# Patient Record
Sex: MALE | Race: ASIAN | NOT HISPANIC OR LATINO | URBAN - METROPOLITAN AREA
[De-identification: names, ages, dates, MRNs, and addresses within clinical notes are randomized per-mention and may not be internally consistent; named-entity substitution may affect disease eponyms.]

---
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
## 2020-01-29 NOTE — ED ADULT TRIAGE NOTE - MEANS OF ARRIVAL
wheelchair Complex Repair And Skin Substitute Graft Text: The defect edges were debeveled with a #15 scalpel blade.  The primary defect was closed partially with a complex linear closure.  Given the location of the remaining defect, shape of the defect and the proximity to free margins a skin substitute graft was deemed most appropriate to repair the remaining defect.  The graft was trimmed to fit the size of the remaining defect.  The graft was then placed in the primary defect, oriented appropriately, and sutured into place.

## 2022-06-26 ENCOUNTER — EMERGENCY (EMERGENCY)
Facility: HOSPITAL | Age: 22
LOS: 1 days | Discharge: HOME | End: 2022-06-26
Attending: EMERGENCY MEDICINE | Admitting: EMERGENCY MEDICINE
Payer: MEDICAID

## 2022-06-26 VITALS
HEART RATE: 72 BPM | TEMPERATURE: 97 F | RESPIRATION RATE: 18 BRPM | DIASTOLIC BLOOD PRESSURE: 84 MMHG | OXYGEN SATURATION: 99 % | SYSTOLIC BLOOD PRESSURE: 122 MMHG

## 2022-06-26 VITALS
WEIGHT: 184.97 LBS | SYSTOLIC BLOOD PRESSURE: 141 MMHG | HEART RATE: 91 BPM | DIASTOLIC BLOOD PRESSURE: 96 MMHG | OXYGEN SATURATION: 97 % | RESPIRATION RATE: 18 BRPM | HEIGHT: 68 IN | TEMPERATURE: 99 F

## 2022-06-26 PROCEDURE — 73030 X-RAY EXAM OF SHOULDER: CPT | Mod: 26,LT

## 2022-06-26 PROCEDURE — 96374 THER/PROPH/DIAG INJ IV PUSH: CPT

## 2022-06-26 PROCEDURE — 99284 EMERGENCY DEPT VISIT MOD MDM: CPT

## 2022-06-26 PROCEDURE — 73030 X-RAY EXAM OF SHOULDER: CPT

## 2022-06-26 PROCEDURE — 99284 EMERGENCY DEPT VISIT MOD MDM: CPT | Mod: 25

## 2022-06-26 PROCEDURE — 73030 X-RAY EXAM OF SHOULDER: CPT | Mod: 26,LT,77

## 2022-06-26 RX ORDER — MORPHINE SULFATE 50 MG/1
4 CAPSULE, EXTENDED RELEASE ORAL ONCE
Refills: 0 | Status: DISCONTINUED | OUTPATIENT
Start: 2022-06-26 | End: 2022-06-26

## 2022-06-26 RX ADMIN — MORPHINE SULFATE 4 MILLIGRAM(S): 50 CAPSULE, EXTENDED RELEASE ORAL at 20:10

## 2022-06-26 RX ADMIN — MORPHINE SULFATE 4 MILLIGRAM(S): 50 CAPSULE, EXTENDED RELEASE ORAL at 19:55

## 2022-06-26 NOTE — ED PROVIDER NOTE - NSFOLLOWUPINSTRUCTIONS_ED_ALL_ED_FT
Shoulder Dislocation       Your shoulder joint is made up of 3 bones:  •The upper arm bone (humerus).      •The shoulder blade (scapula).      •The collarbone (clavicle).      A shoulder dislocation happens when your upper arm bone moves out of its normal place in your shoulder joint.      What are the causes?    This condition is often caused by:  •A fall.      •A hard, direct hit to the shoulder.      •A forceful movement of the shoulder.        What increases the risk?    You are more likely to develop this condition if you play sports.      What are the signs or symptoms?     •Bad shape (deformity) of the shoulder.      •Very bad pain.      •A shoulder that you cannot move.      •Numbness, weakness, or tingling in your neck or down your arm.      •Bruising or swelling around your shoulder.        How is this treated?    This condition is treated with a procedure called a reduction. This is done to place the upper arm bone back in the joint. There are two types of reduction:  •Closed reduction. The upper arm bone is placed back in the joint without surgery. The doctor uses his or her hands to guide the bone back into place.    •Open reduction. Surgery is done to place the upper arm bone back in the joint. This may be needed if:  •You have a weak shoulder joint or weak tissues that connect bones to each other (ligaments).      •You have had more than one shoulder dislocation.      •The nerves or blood vessels around your shoulder have been damaged.        After the procedure, you will wear a brace or sling to prevent the arm from moving.    After the brace or sling is removed, you will have physical therapy to help improve movement (range of motion) in your shoulder joint.      Follow these instructions at home:    Medicines     •Take over-the-counter and prescription medicines only as told by your doctor.    •Ask your doctor if the medicine prescribed to you:   •Requires you to avoid driving or using heavy machinery.     •Can cause trouble pooping (constipation). You may need to take steps to prevent or treat trouble pooping:  •Drink enough fluid to keep your pee (urine) pale yellow.      •Take over-the-counter or prescription medicines.      •Eat foods that are high in fiber. These include beans, whole grains, and fresh fruits and vegetables.       •Limit foods that are high in fat and sugar. These include fried or sweet foods.          If you have a brace or sling:     •Wear the brace or sling as told by your doctor. Remove it only as told by your doctor.    •Loosen the brace or sling if your fingers:  •Tingle.      •Become numb.      •Turn cold or blue.        •Keep the brace or sling clean.    •If the brace or sling is not waterproof:  •Do not let it get wet.      •Cover it with a watertight covering when you take a bath or shower.          Managing pain, stiffness, and swelling    •If told, put ice on the injured area.  •If you can remove your brace or sling, remove it as told by your doctor.      •Put ice in a plastic bag.      •Place a towel between your skin and the bag.      •Leave the ice on for 20 minutes, 2–3 times per day.        •Move your fingers often.      •Raise (elevate) the injured area above the level of your heart while you are sitting or lying down.      Activity     • Do not lift your arm above shoulder level until your doctor approves.      • Do not lift anything until your doctor says that it is safe.      • Do not push or pull things until your doctor approves.      •Return to your normal activities as told by your doctor. Ask your doctor what activities are safe for you.      •Do range-of-motion exercises only as told by your doctor.      •Exercise your hand by squeezing a soft ball. This keeps your hand and wrist from getting stiff and swollen.      General instructions     • Do not drive while you are wearing a brace or sling on a hand that you use for driving.      • Do not take baths, swim, or use a hot tub until your doctor approves. Ask your doctor if you may take showers. You may only be allowed to take sponge baths.      • Do not use any tobacco products, including cigarettes, chewing tobacco, or e-cigarettes. These can delay healing. If you need help quitting, ask your doctor.      •Keep all follow-up visits as told by your doctor. This is important.        Contact a doctor if:    •Your brace or sling gets damaged.        Get help right away if:    •Your pain gets worse, not better.      •You lose feeling in your arm or hand.      •Your arm or hand turns white and cold.        Summary    •A shoulder dislocation happens when your upper arm bone moves out of its normal place in your shoulder joint.      •It is often caused by a fall, a strong hit to the shoulder, or a forceful movement of the shoulder.      •It causes very bad pain. You may not be able to move your shoulder.      •This condition is treated with either closed or open reduction. You will also be given a brace or sling. You will do exercises to improve movement in your shoulder joint.      •Contact a doctor if your brace or sling gets damaged. Get help right away if your pain gets worse, you lose feeling in your arm or hand, or your arm or hand turns white or cold.      This information is not intended to replace advice given to you by your health care provider. Make sure you discuss any questions you have with your health care provider.      Document Revised: 07/17/2019 Document Reviewed: 07/17/2019    Elsevier Patient Education © 2022 [employer]

## 2022-06-26 NOTE — ED PROVIDER NOTE - CARE PROVIDER_API CALL
Jabari Olivera)  Len Pandya  Physicians  [address] [place] [address] [address]  [place] [address]  Phone: [phone]  Fax: [phone]  Follow Up Time: 4-6 Days

## 2022-06-26 NOTE — ED PROVIDER NOTE - PATIENT PORTAL LINK FT
You can access the FollowMyHealth Patient Portal offered by [facility] by registering at the following website: [email or web address]. By joining [employer]’s FollowMyHealth portal, you will also be able to view your health information using other applications (apps) compatible with our system.

## 2022-06-26 NOTE — ED ADULT NURSE NOTE - OBJECTIVE STATEMENT
Patient A/Ox4 with clear speech and a steady gait. Mother at bedside. C/o left shoulder dislocation while swimming today. Says this has happened once before. Left shoulder appears deformed on exam. + brachial and radial pulses. Denies numbness or tingling. Ice packs applied. Patient was in UC today where x rays of confirmation were done and sling was applied. NAD noted.

## 2022-06-26 NOTE — ED ADULT NURSE NOTE - CHIEF COMPLAINT
Chief Complaint   Patient presents with     Ent Problem     would like to discuss inspire implant- last home sleep study done in 2016 has CPAP but unable to use it      History of Present Illness   Sarath Gray is a 55 year old male who presents today for evaluation.  The patient describes symptoms of a long history of obstructive sleep apnea.  He has been trying for the last several years to use CPAP every night.  He oftentimes wakes up and has taken the CPAP.  He does have problems with the CPAP leaking.  He has a lot of trouble breathing through his nose and so nasal mask do not help him.  He describes poor sleep, restless sleep, daytime tiredness, etc.  He has a lot of aches and pains and so has trouble sleeping continuously on his back.  He does still have his tonsils.  He had multiple nasal injuries in the past.  He is a current tobacco user, roughly 1 pack/day.    The patient underwent a home sleep study on 4/11/2016 which showed an overall AHI of 57.2 events per hour.  The patient's BMI at the time of the study was 24.9 kg/m . The patient's current BMI is 26.35 kg/m .    Past Medical History  Patient Active Problem List   Diagnosis     Esophageal reflux     Tobacco use disorder     Impotence of organic origin     Low back pain     Hyperlipidemia LDL goal <100     History of PSVT (paroxysmal supraventricular tachycardia)     S/P total knee arthroplasty     Left knee DJD     Essential hypertension, benign     CARROLL (obstructive sleep apnea)     Acute appendicitis with generalized peritonitis, unspecified whether abscess present, unspecified whether gangrene present, unspecified whether perforation present     Acute perforated appendicitis     Postoperative infection     Postoperative intra-abdominal abscess     Current Medications     Current Outpatient Medications:      albuterol (PROAIR HFA) 108 (90 Base) MCG/ACT inhaler, Inhale 2 puffs into the lungs every 4 hours as needed for shortness of breath /  The patient is a 21y Male complaining of pain, shoulder. dyspnea or wheezing, Disp: 1 Inhaler, Rfl: 11     amLODIPine (NORVASC) 10 MG tablet, Take 1 tablet (10 mg) by mouth daily, Disp: 90 tablet, Rfl: 1     atorvastatin (LIPITOR) 20 MG tablet, Take 1 tablet (20 mg) by mouth daily, Disp: 90 tablet, Rfl: 2     lisinopril (ZESTRIL) 20 MG tablet, Take 1 tablet (20 mg) by mouth daily, Disp: 90 tablet, Rfl: 1     omeprazole (PRILOSEC) 40 MG DR capsule, Take 1 Capsule BY MOUTH TWICE DAILY, Disp: 180 capsule, Rfl: 1     order for DME, Equipment being ordered: CPAP Patient Sarath Gray was set up at [facility] [place]  on April 21, 2016. Patient received a ResYarraa AirSense 10 Auto. Pressures were set at Auto 5 - 15 cm H2O.   Patient s ramp is 5 cm H2O for Auto and FLEX/EPR is 2.  Patient received a [employer] Mask name: SIMPLUS  Full Face mask Size Large, heated tubing and heated humidifier.  Patient is enrolled in the STM Program and does not need to meet compliance. Patient has a follow up on June 14TH AT 9 AM with YADIRA Resendez.   Jennifer Sam, Disp: , Rfl:      ORDER FOR DME, Equipment being ordered: Other: CPM machine for home use. Set max tolerance and increase 3-5 degrees/day as tolerated. Use up to 6-8 hours/day as tolerated. Treatment Diagnosis: left TKA, Disp: 1 Device, Rfl: 0  No current facility-administered medications for this visit.    Facility-Administered Medications Ordered in Other Visits:      iopamidol (ISOVUE-370) 76% solution 80 mL, 80 mL, Intravenous, Once, Maik Smith MD     sodium chloride 0.9 % for CT scan flush dose 80 mL, 80 mL, Intravenous, Once, Maik Smith MD    Allergies  Allergies   Allergen Reactions     Persantine [Dipyridamole] Other (See Comments)     Nervous and anxiety       Social History   Social History     Socioeconomic History     Marital status: Single     Spouse name: Not on file     Number of children: Not on file     Years of education: Not on file     Highest education level: Not on file   Occupational History     Not  "on file   Tobacco Use     Smoking status: Light Tobacco Smoker     Packs/day: 1.00     Years: 18.00     Pack years: 18.00     Smokeless tobacco: Never Used     Tobacco comment: Started at age 29.    Substance and Sexual Activity     Alcohol use: Yes     Alcohol/week: 0.0 standard drinks     Comment: occassional beer     Drug use: No     Sexual activity: Yes     Partners: Female     Birth control/protection: Surgical   Other Topics Concern     Parent/sibling w/ CABG, MI or angioplasty before 65F 55M? Not Asked   Social History Narrative    ** Merged History Encounter **          Social Determinants of Health     Financial Resource Strain: Not on file   Food Insecurity: Not on file   Transportation Needs: Not on file   Physical Activity: Not on file   Stress: Not on file   Social Connections: Not on file   Intimate Partner Violence: Not on file   Housing Stability: Not on file       Family History  Family History   Problem Relation Age of Onset     C.A.D. Father 36        MI at age 36-37     Cerebrovascular Disease Father      Allergies Father      Anesthesia Reaction Father      Cardiovascular Paternal Grandfather      Prostate Cancer No family hx of      Colon Cancer No family hx of        Review of Systems  As per HPI and PMHx, otherwise 10+ comprehensive system review is negative.    Physical Exam  BP (!) 151/88 (BP Location: Left arm, Patient Position: Chair, Cuff Size: Adult Regular)   Pulse 83   Temp 98  F (36.7  C) (Tympanic)   Ht 2.083 m (6' 10\")   Wt 114.3 kg (252 lb)   SpO2 99%   BMI 26.35 kg/m    GENERAL: Patient is a pleasant, cooperative 55 year old male in no acute distress.  HEAD: Normocephalic, atraumatic.  Hair and scalp are normal.  EYES: Pupils are equal, round, reactive to light and accommodation.  Extraocular movements are intact.  The sclera nonicteric without injection.  The extraocular structures are normal.  EARS: Normal shape and symmetry.  No tenderness when palpating the mastoid or " tragal areas bilaterally.    NOSE: Nares are patent.  Nasal mucosa is dry.  The patient has a rightward nasal septal deviation.  No nasal cavity masses, polyps, or mucopurulence on anterior rhinoscopy.  ORAL CAVITY: Dentition is in mixed repair.  Mucous membranes are dry.  Tongue is mobile, protrudes to the midline.  Palate elevates symmetrically.  Tonsils are 1+, symmetric.  No erythema or exudate.  Uvula is somewhat elongated.  No oral cavity or oropharyngeal masses, lesions, ulcerations, leukoplakia.  The patient has a Nicholson tongue/palate position grade 3.  NECK: Supple, trachea is midline.  The patient has 3 fingerbreadths of hyomental distance.  There no palpable cervical lymphadenopathy or masses bilaterally.      NEUROLOGIC: Cranial nerves II through XII are grossly intact.  Voice is strong.  Patient is House-Brackmann I/VI bilaterally.  CARDIOVASCULAR: Extremities are warm and well-perfused.  No significant peripheral edema.  RESPIRATORY: Patient has nonlabored breathing without cough, wheeze, stridor.  PSYCHIATRIC: Patient is alert and oriented.  Mood and affect appear normal.  SKIN: Warm and dry.  No scalp, face, or neck lesions noted.    Assessment and Plan     ICD-10-CM    1. Severe obstructive sleep apnea  G47.33 Case Request: DRUG INDUCED SLEEP ENDOSCOPY   2. Intolerance of continuous positive airway pressure (CPAP) ventilation  Z78.9 Case Request: DRUG INDUCED SLEEP ENDOSCOPY   3. BMI 26.0-26.9,adult  Z68.26 Case Request: DRUG INDUCED SLEEP ENDOSCOPY   4. Tobacco dependence  F17.200    5. Encounter for tobacco use cessation counseling  Z71.6      It was my pleasure seeing Sarath Gray today in clinic.  The patient presents to clinic today with severe obstructive sleep apnea intolerant to CPAP.  The patient's BMI is a 6.35 kg/m .  They are interested in the hypoglossal nerve stimulator.  We discussed placement of the hypoglossal nerve stimulator including postoperative course, activation, need  for battery change, limitation of the chest/abdomen/pelvis MRI, need for alteration of airport screening.  We discussed the need of drug induced sleep endoscopy to ensure candidacy. Given the age of his prior sleep study, he would likely need an updated sleep study either home or in lab.  We can get the sleep endoscopy first to see if he is a candidate and then pursue updated sleep study if he is a candidate.     We discussed the risks, benefits, alternatives, options of drug-induced sleep endoscopy including, but not, limited to: risk of general anesthesia, potential need for additional procedures.  We discussed the postoperative course and convalescence and need for  the day of the procedure.  The patient voiced understanding and is willing to proceed.    Sarath to follow up with Primary Care provider regarding elevated blood pressure.    Ciro Muniz MD  Department of Otolaryngology-Head and Neck Surgery  [facility]

## 2022-06-26 NOTE — ED PROVIDER NOTE - OBJECTIVE STATEMENT
20 yo male with pain to left shoulder today. Patient is right hand dominant.   Has h/o dislocated shoulder and feels the same.   Injury while swimming today. Denies numbness and tingling + pain left shoulder.

## 2022-06-26 NOTE — ED PROVIDER NOTE - CLINICAL SUMMARY MEDICAL DECISION MAKING FREE TEXT BOX
20 yo M p/w R shoulder pain sp inj today - states feels dislocated. Pt with no fall / head inj. no neck / back pain. no numb/ting/focal weak. No cp/sob/palp. Inc pain with movement of shoulder. No other acute inj or co.  Pt with no covid exposure.  Exam: MM moist. neck supple. no meningeal signs. R shoulder with obv deformity, palpable glenoid. NL dist str/sens equal bl, 2+ pulses. nl cap refill. no other acute signs of trauma.  Acute Shoulder disloc, reduction by PA, post-red xr, outpt fu.

## 2022-06-27 PROBLEM — Z00.00 ENCOUNTER FOR PREVENTIVE HEALTH EXAMINATION: Status: ACTIVE | Noted: 2022-06-27

## 2022-07-08 ENCOUNTER — APPOINTMENT (OUTPATIENT)
Dept: ORTHOPEDIC SURGERY | Facility: CLINIC | Age: 22
End: 2022-07-08

## 2022-07-08 VITALS — WEIGHT: 185 LBS | HEIGHT: 68 IN | BODY MASS INDEX: 28.04 KG/M2

## 2022-07-08 DIAGNOSIS — Z78.9 OTHER SPECIFIED HEALTH STATUS: ICD-10-CM

## 2022-07-08 PROCEDURE — 73030 X-RAY EXAM OF SHOULDER: CPT | Mod: LT

## 2022-07-08 PROCEDURE — 99204 OFFICE O/P NEW MOD 45 MIN: CPT

## 2022-07-08 NOTE — DISCUSSION/SUMMARY
[de-identified] : Patient will have a left shoulder MRI arthrogram to rule out labrum tear. They will follow up after test.\par Will begin pt.\par Fu after test. \par May consider surgery. \par \par "Written by Jairo Paz, acting as Scribe for Jabari Olivera M.D" \par \par Home Exercise \par \par  The patient is instructed on a home exercise program. \par  \par RICE \par I explained to the patient that rest, ice, compression, and elevation would benefit them.  They may return to activity after follow-up or when they no longer have any pain. \par  \par Pain Guide Activities \par The patient was advised to let pain guide the gradual advancement of activities. \par  \par Activity Modification \par The patient was advised to modify their activities. \par  \par Dx / Natural History \par The patient was advised of the diagnosis.  The natural history of the pathology was explained in full to the patient in layman's terms.  Several different treatment options were discussed and explained in full to the patient including the risks and benefits of both surgical and non-surgical treatments.  All questions and concerns were answered.\par

## 2022-07-08 NOTE — HISTORY OF PRESENT ILLNESS
[de-identified] : The patient is a 21 year old right hand dominant male who presents today complaining of left shoulder.  Has been dislocated multiple times. One dislocation 5 years ago, went to ER. And two occasion after that that he was able to relocate it himself, very recently it dislocated again while swimming and then relocated itself. \par Date of Injury/Onset: 2 weeks ago\par Pain:    At Rest: 0/10 \par With Activity:  0/10 \par Mechanism of injury: Patient was swimming and heard a pop went to the ER was told it was dislocated\par This is not a Work Related Injury being treated under Worker's Compensation.\par This is not an athletic injury occurring associated with an interscholastic or organized sports team.\par Quality of symptoms: \par Improves with: rest\par Worse with: activity\par Prior treatment: [facility] \par Prior Imaging: none\par Out of work/sport: _, since _\par School/Sport/Position/Occupation: working, fulltime \par Additional Information: None\par

## 2022-07-08 NOTE — PHYSICAL EXAM
[de-identified] : Neurologic: normal coordination, normal DTR UE/LE , normal sensation, normal mood and affect, orientated and able to communicate.\par \par Skin: normal skin, no rash, no ulcers and no lesions.\par \par Lymphatic: no obvious lymphadenopathy in areas examined.\par \par Constitutional: well developed and well nourished.\par \par Cardiovascular: peripheral vascular exam is grossly normal.\par \par Pulmonary: no respiratory distress, lungs clear to auscultation bilaterally.\par \par Abdomen: normal bowel sounds, non-tender, no HSM and no mass.\par  [] : motor and sensory intact distally [Left] : left shoulder [There are no fractures, subluxations or dislocations. No significant abnormalities are seen] : There are no fractures, subluxations or dislocations. No significant abnormalities are seen [de-identified] : apprehention, relocation, 3+ anterior tability.  [de-identified] : apprehension, relocation, 3+ anterior stability.

## 2022-07-29 ENCOUNTER — APPOINTMENT (OUTPATIENT)
Dept: ORTHOPEDIC SURGERY | Facility: CLINIC | Age: 22
End: 2022-07-29

## 2022-07-29 DIAGNOSIS — M79.18 MYALGIA, OTHER SITE: ICD-10-CM

## 2022-07-29 DIAGNOSIS — M25.512 PAIN IN LEFT SHOULDER: ICD-10-CM

## 2022-07-29 PROCEDURE — 99214 OFFICE O/P EST MOD 30 MIN: CPT

## 2022-08-01 NOTE — DATA REVIEWED
[FreeTextEntry1] : [facility] 7/22/22 Lt shoulder MRI contrast: Slap tear extending into the posterosuperior labrum, with small posterosuperior paralabral cyst.Mild tendinopathy/ articular surface fraying of the supraspinatus and naterior infraspinatus tendons. No rotator cuff tear identified. Moderate sized chronic hill sachs deformity. of the humeral head with subcortical cystic change.

## 2022-08-01 NOTE — DISCUSSION/SUMMARY
[de-identified] : Will begin pt for SLAP tear. Will follow up if pain persists. \par \par "Written by Jairo Paz, acting as Scribe for Jabari Olivera M.D" \par \par Home Exercise \par \par  The patient is instructed on a home exercise program. \par  \par RICE \par I explained to the patient that rest, ice, compression, and elevation would benefit them.  They may return to activity after follow-up or when they no longer have any pain. \par  \par Pain Guide Activities \par The patient was advised to let pain guide the gradual advancement of activities. \par  \par Activity Modification \par The patient was advised to modify their activities. \par  \par Dx / Natural History \par The patient was advised of the diagnosis.  The natural history of the pathology was explained in full to the patient in layman's terms.  Several different treatment options were discussed and explained in full to the patient including the risks and benefits of both surgical and non-surgical treatments.  All questions and concerns were answered.\par

## 2022-08-01 NOTE — HISTORY OF PRESENT ILLNESS
[de-identified] : 7/29/22: Patient presents for MRI review left shoulder. \par \par The patient is a 21 year old right hand dominant male who presents today complaining of left shoulder. Has been dislocated multiple times. One dislocation 5 years ago, went to ER. And two occasion after that that he was able to relocate it himself, very recently it dislocated again while swimming and then relocated itself. \par Date of Injury/Onset: 2 weeks ago\par Pain: At Rest: 0/10 \par With Activity: 0/10 \par Mechanism of injury: Patient was swimming and heard a pop went to the ER was told it was dislocated\par This is not a Work Related Injury being treated under Worker's Compensation.\par This is not an athletic injury occurring associated with an interscholastic or organized sports team.\par Quality of symptoms: \par Improves with: rest\par Worse with: activity\par Treatment/ Images/ Studies since last visit: \par Out of work/sport: _, since _\par School/Sport/Position/Occupation: working, fulltime \par Additional Information: None\par

## 2023-04-19 NOTE — ED PROVIDER NOTE - CHPI ED SYMPTOMS NEG
28-Mar-2023 18:40 no abrasion/no back pain/no fever/no numbness/no tingling/no bruising/no difficulty bearing weight

## 2023-05-22 NOTE — ED ADULT NURSE NOTE - NS_SISCREENINGSR_GEN_ALL_ED
Negative Ear Wedge Repair Text: A wedge excision was completed by carrying down an excision through the full thickness of the ear and cartilage with an inward facing Burow's triangle. The wound was then closed in a layered fashion.

## 2023-07-07 ENCOUNTER — NON-APPOINTMENT (OUTPATIENT)
Age: 23
End: 2023-07-07

## 2024-03-03 ENCOUNTER — NON-APPOINTMENT (OUTPATIENT)
Age: 24
End: 2024-03-03

## 2024-03-04 NOTE — ED ADULT TRIAGE NOTE - CADM TRG TX PRIOR TO ARRIVAL
none
,[email or web address]

## 2024-03-05 ENCOUNTER — INPATIENT (INPATIENT)
Facility: HOSPITAL | Age: 24
LOS: 3 days | Discharge: HOME | DRG: 871 | End: 2024-03-09
Attending: INTERNAL MEDICINE | Admitting: INTERNAL MEDICINE
Payer: MEDICAID

## 2024-03-05 VITALS
TEMPERATURE: 98 F | OXYGEN SATURATION: 98 % | DIASTOLIC BLOOD PRESSURE: 60 MMHG | SYSTOLIC BLOOD PRESSURE: 120 MMHG | RESPIRATION RATE: 16 BRPM | HEART RATE: 108 BPM

## 2024-03-05 DIAGNOSIS — Z29.9 ENCOUNTER FOR PROPHYLACTIC MEASURES, UNSPECIFIED: ICD-10-CM

## 2024-03-05 DIAGNOSIS — N17.9 ACUTE KIDNEY FAILURE, UNSPECIFIED: ICD-10-CM

## 2024-03-05 DIAGNOSIS — E87.8 OTHER DISORDERS OF ELECTROLYTE AND FLUID BALANCE, NOT ELSEWHERE CLASSIFIED: ICD-10-CM

## 2024-03-05 DIAGNOSIS — A41.9 SEPSIS, UNSPECIFIED ORGANISM: ICD-10-CM

## 2024-03-05 DIAGNOSIS — M25.512 PAIN IN LEFT SHOULDER: ICD-10-CM

## 2024-03-05 LAB
ALBUMIN SERPL ELPH-MCNC: 2.6 G/DL — LOW (ref 3.3–5)
ALP SERPL-CCNC: 84 U/L — SIGNIFICANT CHANGE UP (ref 40–120)
ALT FLD-CCNC: 59 U/L — SIGNIFICANT CHANGE UP (ref 12–78)
ANION GAP SERPL CALC-SCNC: 16 MMOL/L — SIGNIFICANT CHANGE UP (ref 5–17)
ANISOCYTOSIS BLD QL: SLIGHT — SIGNIFICANT CHANGE UP
AST SERPL-CCNC: 42 U/L — HIGH (ref 15–37)
BASOPHILS # BLD AUTO: 0 K/UL — SIGNIFICANT CHANGE UP (ref 0–0.2)
BASOPHILS NFR BLD AUTO: 0 % — SIGNIFICANT CHANGE UP (ref 0–2)
BILIRUB SERPL-MCNC: 2.7 MG/DL — HIGH (ref 0.2–1.2)
BUN SERPL-MCNC: 29 MG/DL — HIGH (ref 7–23)
CALCIUM SERPL-MCNC: 8.7 MG/DL — SIGNIFICANT CHANGE UP (ref 8.5–10.1)
CHLORIDE SERPL-SCNC: 97 MMOL/L — SIGNIFICANT CHANGE UP (ref 96–108)
CK SERPL-CCNC: 235 U/L — SIGNIFICANT CHANGE UP (ref 26–308)
CO2 SERPL-SCNC: 21 MMOL/L — LOW (ref 22–31)
CREAT SERPL-MCNC: 4.8 MG/DL — HIGH (ref 0.5–1.3)
EGFR: 16 ML/MIN/1.73M2 — LOW
EOSINOPHIL # BLD AUTO: 0.26 K/UL — SIGNIFICANT CHANGE UP (ref 0–0.5)
EOSINOPHIL NFR BLD AUTO: 2 % — SIGNIFICANT CHANGE UP (ref 0–6)
FLUAV AG NPH QL: SIGNIFICANT CHANGE UP
FLUBV AG NPH QL: SIGNIFICANT CHANGE UP
GLUCOSE SERPL-MCNC: 156 MG/DL — HIGH (ref 70–99)
HCT VFR BLD CALC: 43 % — SIGNIFICANT CHANGE UP (ref 39–50)
HGB BLD-MCNC: 14.6 G/DL — SIGNIFICANT CHANGE UP (ref 13–17)
LACTATE SERPL-SCNC: 5.8 MMOL/L — CRITICAL HIGH (ref 0.7–2)
LACTATE SERPL-SCNC: 8.6 MMOL/L — CRITICAL HIGH (ref 0.7–2)
LIDOCAIN IGE QN: 53 U/L — SIGNIFICANT CHANGE UP (ref 13–75)
LYMPHOCYTES # BLD AUTO: 0 % — LOW (ref 13–44)
LYMPHOCYTES # BLD AUTO: 0 K/UL — LOW (ref 1–3.3)
MAGNESIUM SERPL-MCNC: 0.8 MG/DL — CRITICAL LOW (ref 1.6–2.6)
MANUAL SMEAR VERIFICATION: SIGNIFICANT CHANGE UP
MCHC RBC-ENTMCNC: 29 PG — SIGNIFICANT CHANGE UP (ref 27–34)
MCHC RBC-ENTMCNC: 34 GM/DL — SIGNIFICANT CHANGE UP (ref 32–36)
MCV RBC AUTO: 85.5 FL — SIGNIFICANT CHANGE UP (ref 80–100)
METAMYELOCYTES # FLD: 3 % — HIGH (ref 0–0)
MONOCYTES # BLD AUTO: 0 K/UL — SIGNIFICANT CHANGE UP (ref 0–0.9)
MONOCYTES NFR BLD AUTO: 0 % — LOW (ref 2–14)
MYELOCYTES NFR BLD: 5 % — HIGH (ref 0–0)
NEUTROPHILS # BLD AUTO: 11.56 K/UL — HIGH (ref 1.8–7.4)
NEUTROPHILS NFR BLD AUTO: 47 % — SIGNIFICANT CHANGE UP (ref 43–77)
NEUTS BAND # BLD: 43 % — HIGH (ref 0–8)
NRBC # BLD: 0 /100 WBCS — SIGNIFICANT CHANGE UP (ref 0–0)
NRBC # BLD: SIGNIFICANT CHANGE UP /100 WBCS (ref 0–0)
PLAT MORPH BLD: NORMAL — SIGNIFICANT CHANGE UP
PLATELET # BLD AUTO: 209 K/UL — SIGNIFICANT CHANGE UP (ref 150–400)
POIKILOCYTOSIS BLD QL AUTO: SLIGHT — SIGNIFICANT CHANGE UP
POTASSIUM SERPL-MCNC: 4.3 MMOL/L — SIGNIFICANT CHANGE UP (ref 3.5–5.3)
POTASSIUM SERPL-SCNC: 4.3 MMOL/L — SIGNIFICANT CHANGE UP (ref 3.5–5.3)
PROT SERPL-MCNC: 6.7 G/DL — SIGNIFICANT CHANGE UP (ref 6–8.3)
RBC # BLD: 5.03 M/UL — SIGNIFICANT CHANGE UP (ref 4.2–5.8)
RBC # FLD: 12.8 % — SIGNIFICANT CHANGE UP (ref 10.3–14.5)
RBC BLD AUTO: SIGNIFICANT CHANGE UP
RSV RNA NPH QL NAA+NON-PROBE: SIGNIFICANT CHANGE UP
SARS-COV-2 RNA SPEC QL NAA+PROBE: SIGNIFICANT CHANGE UP
SODIUM SERPL-SCNC: 134 MMOL/L — LOW (ref 135–145)
WBC # BLD: 12.84 K/UL — HIGH (ref 3.8–10.5)
WBC # FLD AUTO: 12.84 K/UL — HIGH (ref 3.8–10.5)

## 2024-03-05 PROCEDURE — 99223 1ST HOSP IP/OBS HIGH 75: CPT

## 2024-03-05 PROCEDURE — 99285 EMERGENCY DEPT VISIT HI MDM: CPT

## 2024-03-05 PROCEDURE — 93010 ELECTROCARDIOGRAM REPORT: CPT

## 2024-03-05 PROCEDURE — 70450 CT HEAD/BRAIN W/O DYE: CPT | Mod: 26

## 2024-03-05 PROCEDURE — 76770 US EXAM ABDO BACK WALL COMP: CPT | Mod: 26

## 2024-03-05 PROCEDURE — 71250 CT THORAX DX C-: CPT | Mod: 26

## 2024-03-05 PROCEDURE — 99223 1ST HOSP IP/OBS HIGH 75: CPT | Mod: GC

## 2024-03-05 PROCEDURE — 74176 CT ABD & PELVIS W/O CONTRAST: CPT | Mod: 26

## 2024-03-05 PROCEDURE — 71045 X-RAY EXAM CHEST 1 VIEW: CPT | Mod: 26

## 2024-03-05 RX ORDER — ACETAMINOPHEN 500 MG
1000 TABLET ORAL ONCE
Refills: 0 | Status: COMPLETED | OUTPATIENT
Start: 2024-03-05 | End: 2024-03-05

## 2024-03-05 RX ORDER — ACETAMINOPHEN 500 MG
650 TABLET ORAL EVERY 6 HOURS
Refills: 0 | Status: DISCONTINUED | OUTPATIENT
Start: 2024-03-05 | End: 2024-03-09

## 2024-03-05 RX ORDER — SODIUM CHLORIDE 9 MG/ML
1000 INJECTION INTRAMUSCULAR; INTRAVENOUS; SUBCUTANEOUS
Refills: 0 | Status: DISCONTINUED | OUTPATIENT
Start: 2024-03-05 | End: 2024-03-06

## 2024-03-05 RX ORDER — SACCHAROMYCES BOULARDII 250 MG
250 POWDER IN PACKET (EA) ORAL
Refills: 0 | Status: DISCONTINUED | OUTPATIENT
Start: 2024-03-05 | End: 2024-03-06

## 2024-03-05 RX ORDER — TRAMADOL HYDROCHLORIDE 50 MG/1
25 TABLET ORAL EVERY 6 HOURS
Refills: 0 | Status: DISCONTINUED | OUTPATIENT
Start: 2024-03-05 | End: 2024-03-09

## 2024-03-05 RX ORDER — SODIUM CHLORIDE 9 MG/ML
1000 INJECTION INTRAMUSCULAR; INTRAVENOUS; SUBCUTANEOUS ONCE
Refills: 0 | Status: COMPLETED | OUTPATIENT
Start: 2024-03-05 | End: 2024-03-05

## 2024-03-05 RX ORDER — CEFEPIME 1 G/1
INJECTION, POWDER, FOR SOLUTION INTRAMUSCULAR; INTRAVENOUS
Refills: 0 | Status: DISCONTINUED | OUTPATIENT
Start: 2024-03-05 | End: 2024-03-06

## 2024-03-05 RX ORDER — LANOLIN ALCOHOL/MO/W.PET/CERES
3 CREAM (GRAM) TOPICAL AT BEDTIME
Refills: 0 | Status: DISCONTINUED | OUTPATIENT
Start: 2024-03-05 | End: 2024-03-09

## 2024-03-05 RX ORDER — ONDANSETRON 8 MG/1
4 TABLET, FILM COATED ORAL EVERY 6 HOURS
Refills: 0 | Status: DISCONTINUED | OUTPATIENT
Start: 2024-03-05 | End: 2024-03-05

## 2024-03-05 RX ORDER — HEPARIN SODIUM 5000 [USP'U]/ML
5000 INJECTION INTRAVENOUS; SUBCUTANEOUS EVERY 8 HOURS
Refills: 0 | Status: DISCONTINUED | OUTPATIENT
Start: 2024-03-05 | End: 2024-03-05

## 2024-03-05 RX ORDER — SODIUM CHLORIDE 9 MG/ML
1000 INJECTION INTRAMUSCULAR; INTRAVENOUS; SUBCUTANEOUS
Refills: 0 | Status: DISCONTINUED | OUTPATIENT
Start: 2024-03-05 | End: 2024-03-05

## 2024-03-05 RX ORDER — MAGNESIUM SULFATE 500 MG/ML
2 VIAL (ML) INJECTION ONCE
Refills: 0 | Status: COMPLETED | OUTPATIENT
Start: 2024-03-05 | End: 2024-03-05

## 2024-03-05 RX ORDER — CEFEPIME 1 G/1
1000 INJECTION, POWDER, FOR SOLUTION INTRAMUSCULAR; INTRAVENOUS EVERY 12 HOURS
Refills: 0 | Status: DISCONTINUED | OUTPATIENT
Start: 2024-03-06 | End: 2024-03-06

## 2024-03-05 RX ORDER — LIDOCAINE 4 G/100G
1 CREAM TOPICAL DAILY
Refills: 0 | Status: DISCONTINUED | OUTPATIENT
Start: 2024-03-05 | End: 2024-03-06

## 2024-03-05 RX ORDER — ONDANSETRON 8 MG/1
4 TABLET, FILM COATED ORAL EVERY 4 HOURS
Refills: 0 | Status: DISCONTINUED | OUTPATIENT
Start: 2024-03-05 | End: 2024-03-09

## 2024-03-05 RX ORDER — FAMOTIDINE 10 MG/ML
20 INJECTION INTRAVENOUS ONCE
Refills: 0 | Status: COMPLETED | OUTPATIENT
Start: 2024-03-05 | End: 2024-03-05

## 2024-03-05 RX ORDER — ONDANSETRON 8 MG/1
4 TABLET, FILM COATED ORAL ONCE
Refills: 0 | Status: COMPLETED | OUTPATIENT
Start: 2024-03-05 | End: 2024-03-05

## 2024-03-05 RX ORDER — CEFEPIME 1 G/1
1000 INJECTION, POWDER, FOR SOLUTION INTRAMUSCULAR; INTRAVENOUS ONCE
Refills: 0 | Status: COMPLETED | OUTPATIENT
Start: 2024-03-05 | End: 2024-03-05

## 2024-03-05 RX ORDER — VANCOMYCIN HCL 1 G
1250 VIAL (EA) INTRAVENOUS ONCE
Refills: 0 | Status: COMPLETED | OUTPATIENT
Start: 2024-03-05 | End: 2024-03-05

## 2024-03-05 RX ADMIN — Medication 20 MILLIGRAM(S): at 12:39

## 2024-03-05 RX ADMIN — SODIUM CHLORIDE 1000 MILLILITER(S): 9 INJECTION INTRAMUSCULAR; INTRAVENOUS; SUBCUTANEOUS at 12:41

## 2024-03-05 RX ADMIN — Medication 400 MILLIGRAM(S): at 16:40

## 2024-03-05 RX ADMIN — Medication 166.67 MILLIGRAM(S): at 19:17

## 2024-03-05 RX ADMIN — CEFEPIME 100 MILLIGRAM(S): 1 INJECTION, POWDER, FOR SOLUTION INTRAMUSCULAR; INTRAVENOUS at 17:17

## 2024-03-05 RX ADMIN — FAMOTIDINE 20 MILLIGRAM(S): 10 INJECTION INTRAVENOUS at 12:39

## 2024-03-05 RX ADMIN — ONDANSETRON 4 MILLIGRAM(S): 8 TABLET, FILM COATED ORAL at 16:42

## 2024-03-05 RX ADMIN — SODIUM CHLORIDE 100 MILLILITER(S): 9 INJECTION INTRAMUSCULAR; INTRAVENOUS; SUBCUTANEOUS at 13:59

## 2024-03-05 RX ADMIN — SODIUM CHLORIDE 1000 MILLILITER(S): 9 INJECTION INTRAMUSCULAR; INTRAVENOUS; SUBCUTANEOUS at 22:46

## 2024-03-05 RX ADMIN — Medication 25 GRAM(S): at 14:44

## 2024-03-05 RX ADMIN — LIDOCAINE 1 PATCH: 4 CREAM TOPICAL at 14:55

## 2024-03-05 RX ADMIN — Medication 100 MILLIGRAM(S): at 18:03

## 2024-03-05 RX ADMIN — Medication 1000 MILLIGRAM(S): at 17:11

## 2024-03-05 RX ADMIN — Medication 3 MILLIGRAM(S): at 19:20

## 2024-03-05 RX ADMIN — Medication 250 MILLIGRAM(S): at 19:16

## 2024-03-05 RX ADMIN — SODIUM CHLORIDE 1000 MILLILITER(S): 9 INJECTION INTRAMUSCULAR; INTRAVENOUS; SUBCUTANEOUS at 21:33

## 2024-03-05 RX ADMIN — LIDOCAINE 1 PATCH: 4 CREAM TOPICAL at 19:00

## 2024-03-05 RX ADMIN — ONDANSETRON 4 MILLIGRAM(S): 8 TABLET, FILM COATED ORAL at 12:39

## 2024-03-05 NOTE — H&P ADULT - ATTENDING COMMENTS
24 yo male with no significant pmh presents for fever, chills, nausea for >3 days admitted for sepsis and natalia.    Recent axillary abscess, s/p drainage, coming In with fevers, found to have leukocytosis and 43% bands, with acute sepsis, ID consulted, will f/u recs, f/u cultures, monitor WBC and fever curves.  Antibiotics as per ID, renally dosed.  NATALIA severe, possibly 2/2 doxycycline?  Renal consulted, will f/u US kidneys and bladder, IVF NS.  F/u lactate.    Corey Juarez, Attending Physician 24 yo male with no significant pmh presents for fever, chills, nausea for >3 days admitted for sepsis and natalia.    Recent axillary abscess, s/p drainage, coming In with fevers, found to have leukocytosis and 43% bands, with acute sepsis, ID consulted, will f/u recs, f/u cultures, monitor WBC and fever curves.  Antibiotics as per ID, renally dosed.  NATALIA severe, Renal consulted, will f/u US kidneys and bladder, IVF NS.  F/u lactate.    Corey Juarez, Attending Physician

## 2024-03-05 NOTE — ED PROVIDER NOTE - NSICDXPASTSURGICALHX_GEN_ALL_CORE_FT
Torrie's wife calling, she is concerned that they don't have an arrival time for the 11/12 surgery, I gave her the pre-op number [phone] PAST SURGICAL HISTORY:  No significant past surgical history

## 2024-03-05 NOTE — ED ADULT NURSE NOTE - ED CARDIAC HEART SOUNDS
PRIMARY CARE VISIT        Patient name : Lisbet Hernandez   MRN number: [id number]   YOB: 1949       Reason for visit:   Chief Complaint   Patient presents with   â¢ Follow-up     check up and cold in her stomach       Quality  Â   Adult Wellness CI height documented,Â discussion of regular exercise,Â exercising regularly,Â printed information given for activities,Â discussion of nutritional quality of diet,Â patient education given about proper diet,Â not using alcohol,Â colonoscopy performed: ,Â colonoscopy normal,Â no tobacco use,Â does not have feelings of hopelessness (PHQ-2),Â no Anhedonia (PHQ-2),Â preventive medicine therapy for influenza,Â preventive medicine therapy for pneumococcal,Â pain scale level reviewed,Â has not fallen within the last 12 months,Â able to walk,Â surrogate decision maker documented,Â taking aspirinÂ andÂ discussion of risks and benefits of Aspirin. Diabetes CI height documented,Â no tobacco use,Â did not provide intervention and counseling in regards to tobacco use,Â does not have feelings of hopelessness (PHQ-2),Â no anhedonia (PHQ-2),Â a foot inspection performed,Â right foot was examined - Pedal Pulse,Â left foot was examined - Pedal Pulse,Â Monofilament Wire Test of the right foot was performed,Â preventative medicine test results documented/reviewed for Hemoglobin A1c,Â preventive medicine results documented/reviewed for MicroalbuminuriaÂ andÂ angiotensin receptor blocker prescribed. History of Present Illness  Here for F/U. Nausea and vomiting since yesterday. Brings up cold. No abdominal pain. No diarrhea,  Otherwise feels much better. Breathing improved. No palpitations. No chest pain or SOB. Appetite improved. Gaining weight. Under care of Dr. Chavo Salazar and Nephrologist.  Mild vaginal itch. .      Review of Systems       Review of Systems   Constitutional: Negative for appetite change, chills, diaphoresis, fatigue and fever.    HENT: Negative for congestion, ear pain, hearing loss, nosebleeds, sinus pressure, sneezing, sore throat, tinnitus and voice change. Eyes: Negative for pain, redness and visual disturbance. Respiratory: Negative for cough, chest tightness, shortness of breath and wheezing. Cardiovascular: Negative for chest pain and leg swelling. Gastrointestinal: Positive for nausea and vomiting. Negative for abdominal pain, blood in stool, constipation and diarrhea. Endocrine: Negative for cold intolerance, heat intolerance, polydipsia and polyuria. Genitourinary: Positive for vaginal pain. Negative for difficulty urinating, frequency, hematuria and urgency. Musculoskeletal: Negative for back pain and myalgias. Skin: Negative for rash. Allergic/Immunologic: Negative for environmental allergies, food allergies and immunocompromised state. Neurological: Negative for dizziness, tremors, seizures, syncope, speech difficulty, light-headedness and numbness. Hematological: Negative for adenopathy. Psychiatric/Behavioral: Negative for agitation, behavioral problems, confusion, hallucinations, sleep disturbance and suicidal ideas. Allergies  ALLERGIES:   Allergen Reactions   â¢ Sulfa Antibiotics Other (See Comments)     Unknown       Current Meds    Current Outpatient Medications   Medication Sig Dispense Refill   â¢ blood glucose (ACCU-CHEK MARCIA PLUS) test strip CHECK BLOOD GLUCOSE TWICE DAILY BEFORE MEALS     â¢ fluconazole (DIFLUCAN) 150 MG tablet TAKE 1 TABLET BY MOUTH EVERY DAY     â¢ metFORMIN (GLUCOPHAGE) 500 MG tablet TAKE ONE TABLET BY MOUTH TWO TIMES A DAY     â¢ methimazole (TAPAZOLE) 10 MG tablet TAKE TWO TABLETS BY MOUTH TWICE DAILY     â¢ metoPROLOL succinate (TOPROL-XL) 100 MG 24 hr tablet TAKE 1 TABLET ONCE DAILY. No current facility-administered medications for this visit. Basilia Reading Past Medical History  Past Medical History:   Diagnosis Date   â¢ Pain in both knees        Surgical History  No past surgical history on file.     Family "History  No family history on file. Social History  Social History     Tobacco Use   â¢ Smoking status: Never Smoker   â¢ Smokeless tobacco: Never Used   Substance Use Topics   â¢ Alcohol use: Yes     Comment: very rarely    â¢ Drug use: No               Vitals    Visit Vitals  /72 (BP Location: [facility] [place], Patient Position: Sitting, Cuff Size: Regular)   Pulse 103   Temp 98.5 Â°F (36.9 Â°C) (Tympanic)   Resp 18   Ht 5' 2"" (1.575 m)   Wt 65.2 kg (143 lb 10.1 oz)   SpO2 100%   BMI 26.27 kg/mÂ²              Physical Exam Constitutional: alert,Â in no acute distressÂ andÂ current vital signs reviewedÂ . Tachycardia. Head and Face: atraumatic,Â no deformities,Â normocephalic,Â normal facies. Eyes: no discharge,Â normal conjunctiva,Â no eyelid swelling,Â no ptosisÂ andÂ the sclerae were normal. pupils equal, round and reactive to light and accommodation,Â conjugate gazeÂ andÂ extraocular movements were intact. ENT: normal appearing outer ear,Â normal appearing nose. examination of the tympanic membrane showed normal landmarks,Â normal appearing external canal. nasal mucosa moist and pink,Â no nasal discharge. normal lips. oral mucosa pink and moist,Â no oral lesions. Neck: normal appearing neck,Â supple neckÂ andÂ no mass was seen. thyroid not enlargedÂ andÂ no thyroid nodules. Pulmonary: no respiratory distress,Â normal respiratory rate and effortÂ andÂ no accessory muscle use. breath sounds clear to auscultation bilaterally. Cardiovascular: normal rate,Â no murmurs were heard,Â regular rhythm,Â normal S1Â andÂ normal S2. edema was not present in the lower extremities. Abdomen: soft,Â nontender,Â nondistended,Â normal bowel soundsÂ andÂ no abdominal mass. no hepatomegalyÂ andÂ no splenomegaly. no umbilical hernia was discovered. Musculoskeletal: normal gait. no musculoskeletal erythema was seen,Â no joint swelling seenÂ andÂ no joint tenderness was elicited. no scoliosis. normal range of motion. there was no joint instability noted.  muscle strength " and tone were normal.   Neurologic: cranial nerves grossly intact. normal DTRs. no sensory deficits noted. no coordination deficits. normal gait. muscle strength and tone were normal.   Skin, Hair, Nails: normal skin color and pigmentationÂ andÂ no rash. no foot ulcersÂ andÂ no skin ulcer was seen. normal skin turgor. no clubbing or cyanosis of the fingernails. Assessment/ PLAN  Bilious vomiting with nausea    - trimethobenzamide (TIGAN) injection 200 mg    Acute gastritis without hemorrhage, unspecified gastritis type    Pantoprazole as needed. 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 Plan: CKD-2: Reports from nephrologist reviewed. CPM.  Â   Hyperparathyroidism secondary to CKD: Managed by Nephrologist.  Â   Medical compliance with plan discussed and risks of non-compliance reviewed. Patient education completed on disease process, etiology & prognosis. Patient expresses understanding of the plan. Proper usage and side effects of medications reviewed & discussed. Refer to orders. Return to clinic as clinically indicated as discussed with patient who verbalized understanding of & agreement with the plan. Written handout given. Diabetes: A1c goal was met   Controlled with present regimen. Continued present medications. discussed exercise. discussed weight loss. Recommended Follow Up: Ophthalmology and Podiatry. Hypertension: Controlled. patient education completed on exercise, diet and weight loss. discussed standard DASH diet. continued present medication. recommended to check blood pressure at home. compliance with medication that is given. discussed the importance of medications. Recommended to reduce caffeine intake.    recommended to stop NSAIDS. Hyperthyroidism: Under care of Endocrinologist.      RETURN TO OFFICE  No Follow-up on file.         Terry Merino MD  04/02/19 normal S1, S2 heard

## 2024-03-05 NOTE — H&P ADULT - NSHPPHYSICALEXAM_GEN_ALL_CORE
Vital Signs Last 24 Hrs  T(C): 36.6 (05 Mar 2024 11:54), Max: 36.6 (05 Mar 2024 11:54)  T(F): 97.9 (05 Mar 2024 11:54), Max: 97.9 (05 Mar 2024 11:54)  HR: 108 (05 Mar 2024 11:54) (108 - 108)  BP: 120/60 (05 Mar 2024 11:54) (120/60 - 120/60)  BP(mean): --  RR: 16 (05 Mar 2024 11:54) (16 - 16)  SpO2: 98% (05 Mar 2024 11:54) (98% - 98%)    Parameters below as of 05 Mar 2024 11:54  Patient On (Oxygen Delivery Method): room air    CONSTITUTIONAL: mild distress  EYES: No conjunctival or scleral injection, non-icteric  ENMT: Oral mucosa with moist membranes.   NECK: Supple, symmetric and without tracheal deviation   RESP: No respiratory distress, no use of accessory muscles; CTA b/l, no WRR  CV: RRR, +S1S2, no peripheral edema  GI: Soft, generalized tenderness, ND  MSK: Normal ROM without pain, + left shoulder pain  SKIN: generalized erythema across abdomen  NEURO: Sensation intact in upper and lower extremities b/l to light touch   PSYCH: Appropriate insight/judgment; A+O x 3, mood and affect appropriate

## 2024-03-05 NOTE — CONSULT NOTE ADULT - SUBJECTIVE AND OBJECTIVE BOX
[facility]  INFECTIOUS DISEASES - John Drummond, [place]  [address] [place] [address]  Tel: [phone]     Fax: [phone]  =======================================================    [id number]  GARY DONALDSON     CC: Patient is a 23y old  Male who presents with a chief complaint of Sepsis and NATALIA (05 Mar 2024 14:42)    HPI:  22 yo male with hx of skin infections but no other significant pmh, presents for fever, chills, nausea for x 3 days. Patient states that since Saturday 3/2, he has been having generalized body aches, rigors, fevers, and has been feeling unwell. He had a left axiliary node drainage a few days ago by urgent care, after noticing that he had a skin infection with purulent drainage located near his left axilla. He was started on doxycyline outpatient for the skin infection, but has had persistent fevers. His last temperature was 102.6F and he alternated between Tylenol and ibuprofen but was not able to get his temperature down. He has a history of multiple skin infections and has tolerated doxycyline in the past. Patient also endorses a rash on neck/chest since Saturday, nausea with 2 episodes of NBNB emesis, and loose stool. He also endorses generalized abdominal pain.    Patient denies any sick contacts or recent travel. He works as an  and commutes to [place] via train for his job. He says he has a pet rabbit. Denies any smoking, drinking or drug use.    PAST MEDICAL & SURGICAL HISTORY:  No pertinent past medical history      No significant past surgical history          Social Hx:     FAMILY HISTORY:  FH: type 2 diabetes (Father)    FH: hyperlipidemia (Father, Mother)        Allergies    No Known Allergies    Intolerances        Antibiotics:  MEDICATIONS  (STANDING):  acetaminophen   IVPB .. 1000 milliGRAM(s) IV Intermittent once  cefepime   IVPB 1000 milliGRAM(s) IV Intermittent once  cefepime   IVPB      doxycycline IVPB      doxycycline IVPB 100 milliGRAM(s) IV Intermittent once  lidocaine   4% Patch 1 Patch Transdermal daily  saccharomyces boulardii 250 milliGRAM(s) Oral two times a day  sodium chloride 0.9%. 1000 milliLiter(s) (100 mL/Hr) IV Continuous <Continuous>  vancomycin  IVPB 1250 milliGRAM(s) IV Intermittent once    MEDICATIONS  (PRN):  acetaminophen     Tablet .. 650 milliGRAM(s) Oral every 6 hours PRN Mild Pain (1 - 3)  acetaminophen     Tablet .. 650 milliGRAM(s) Oral every 6 hours PRN Temp greater or equal to 38C (100.4F)  melatonin 3 milliGRAM(s) Oral at bedtime PRN Insomnia  ondansetron Injectable 4 milliGRAM(s) IV Push every 4 hours PRN Nausea and/or Vomiting       REVIEW OF SYSTEMS:  CONSTITUTIONAL:  (+) fever and chills  HEENT:  No sore throat or runny nose.  CARDIOVASCULAR:  No chest pain or SOB.  RESPIRATORY:  No cough, shortness of breath  GASTROINTESTINAL:  see history  GENITOURINARY:  No dysuria, frequency or urgency  MUSCULOSKELETAL:  no joint swelling  SKIN: see history  NEUROLOGIC:  (+) mild headache  PSYCHIATRIC:  No disorder of thought or mood.    Physical Exam:  Vital Signs Last 24 Hrs  T(C): 36.6 (05 Mar 2024 11:54), Max: 36.6 (05 Mar 2024 11:54)  T(F): 97.9 (05 Mar 2024 11:54), Max: 97.9 (05 Mar 2024 11:54)  HR: 108 (05 Mar 2024 11:54) (108 - 108)  BP: 120/60 (05 Mar 2024 11:54) (120/60 - 120/60)  BP(mean): --  RR: 16 (05 Mar 2024 11:54) (16 - 16)  SpO2: 98% (05 Mar 2024 11:54) (98% - 98%)    Parameters below as of 05 Mar 2024 11:54  Patient On (Oxygen Delivery Method): room air        Weight (kg): 89.811 (03-05 @ 12:48)  GEN: NAD  HEENT: normocephalic and atraumatic.   NECK: Supple.   LUNGS: Normal respiratory effort  HEART: Regular rate and rhythm   ABDOMEN: Soft, nontender, and nondistended.    EXTREMITIES: No leg edema.  NEUROLOGIC: AO x 3, no photophobia, no nuchal rigidity  PSYCHIATRIC: Appropriate affect .  SKIN: some erythema noted on neck area; (+) induration on L axilla, no purulence    Labs:  03-05    134<L>  |  97  |  29<H>  ----------------------------<  156<H>  4.3   |  21<L>  |  4.80<H>    Ca    8.7      05 Mar 2024 12:42  Mg     0.8     03-05    TPro  6.7  /  Alb  2.6<L>  /  TBili  2.7<H>  /  DBili  x   /  AST  42<H>  /  ALT  59  /  AlkPhos  84  03-05                          14.6   12.84 )-----------( 209      ( 05 Mar 2024 12:42 )             43.0       Urinalysis Basic - ( 05 Mar 2024 12:42 )    Color: x / Appearance: x / SG: x / pH: x  Gluc: 156 mg/dL / Ketone: x  / Bili: x / Urobili: x   Blood: x / Protein: x / Nitrite: x   Leuk Esterase: x / RBC: x / WBC x   Sq Epi: x / Non Sq Epi: x / Bacteria: x      LIVER FUNCTIONS - ( 05 Mar 2024 12:42 )  Alb: 2.6 g/dL / Pro: 6.7 g/dL / ALK PHOS: 84 U/L / ALT: 59 U/L / AST: 42 U/L / GGT: x           CARDIAC MARKERS ( 05 Mar 2024 12:42 )  x     / x     / 235 U/L / x     / x                  SARS-CoV-2 Result: NotDetec (03-05-24 @ 12:42)      RECENT CULTURES:        All imaging and other data have been reviewed.    < from: Xray Chest 1 View-PORTABLE IMMEDIATE (Xray Chest 1 View-PORTABLE IMMEDIATE .) (03.05.24 @ 13:01) >  Lungs are clear.    No free intraperitoneal air.    IMPRESSION: No acute finding.    < end of copied text >

## 2024-03-05 NOTE — CONSULT NOTE ADULT - SUBJECTIVE AND OBJECTIVE BOX
NEPHROLOGY CONSULTATION    CHIEF COMPLAINT:  NATALIA      HPI:  He has been feeling poorly for several days with high fever (103 F), nausea, vomiting, and diarrhea.  He states he had his left axilla "drained" yesterday at walk in clinic and was given doxycycline.  He denies any significant chronic medical or surgical problems.  He had blood work done about a year ago and "all was normal".        ROS:  urine output and urine "dark yellow"      PAST MEDICAL & SURGICAL HISTORY:  No pertinent past medical history      No significant past surgical history            Social History:  Tobacco: denies  EtOH: denies  Recreational drug use: denies  Lives with: parents  Ambulates: independent  ADLs: independent (05 Mar 2024 14:42)      FAMILY HISTORY:  FH: type 2 diabetes (Father)    FH: hyperlipidemia (Father, Mother)        Home Medications:  no prescriptions  took 2 Advil and some Tylenol      MEDICATIONS  (STANDING):  acetaminophen   IVPB .. 1000 milliGRAM(s) IV Intermittent once  cefepime   IVPB      cefepime   IVPB 1000 milliGRAM(s) IV Intermittent once  doxycycline IVPB 100 milliGRAM(s) IV Intermittent once  doxycycline IVPB      lidocaine   4% Patch 1 Patch Transdermal daily  saccharomyces boulardii 250 milliGRAM(s) Oral two times a day  sodium chloride 0.9%. 1000 milliLiter(s) (100 mL/Hr) IV Continuous <Continuous>  vancomycin  IVPB 1250 milliGRAM(s) IV Intermittent once      PHYSICAL EXAMINATION:  T 97.9     /60    Conversant, no apparent distress  Sclerae injected  Good dentition, no pharyngeal erythema  Neck non tender, no mass, no thyromegaly or nodules  Normal respiratory effort, lungs clear to auscultation  Heart with RRR, no murmurs or rubs, no peripheral edema  Abdomen soft, no masses, no organomegaly  Skin no rashes, ulcers or lesions, normal turgor and temperature  Appropriate affect, AO x 3  Left axilla has firm palpable lymph nodes and surrounding erythema;  some dry drainage noted on dressing    LABS:                        14.6   12.84 )-----------( 209      ( 05 Mar 2024 12:42 )             43.0     03-05    134<L>  |  97  |  29<H>  ----------------------------<  156<H>  4.3   |  21<L>  |  4.80<H>    Ca    8.7      05 Mar 2024 12:42  Mg     0.8     03-05    TPro  6.7  /  Alb  2.6<L>  /  TBili  2.7<H>  /  DBili  x   /  AST  42<H>  /  ALT  59  /  AlkPhos  84  03-05    Lactate 8.6          Urinalysis Basic - ( 05 Mar 2024 12:42 )    Color: x / Appearance: x / SG: x / pH: x  Gluc: 156 mg/dL / Ketone: x  / Bili: x / Urobili: x   Blood: x / Protein: x / Nitrite: x   Leuk Esterase: x / RBC: x / WBC x   Sq Epi: x / Non Sq Epi: x / Bacteria: x        RADIOLOGY:  Chest X-Ray personally reviewed and shows NAPD    Renal sonogram shows 11-12 cm kidneys with no hydronephrosis;   fatty liver        ASSESSMENT:  1.  NATALIA - suspect ATN related to sepsis and/or prerenal azotemia from GI lossess  2.  Febrile illness with some drainage from left axilla, infectious lymphadenitis?    PLAN:  Empiric antibiotics as per ID;  follow blood cultures  Empiric volume expansion  Check urinalysis, urine electrolytes  BMP in AM

## 2024-03-05 NOTE — ED ADULT NURSE REASSESSMENT NOTE - NS ED NURSE REASSESS COMMENT FT1
Patient removed lidocaine patch stating it wasn't helping. Admitting Dr made aware. no patch to left shoulder

## 2024-03-05 NOTE — ED PROVIDER NOTE - PROGRESS NOTE DETAILS
NATALIA cr 4.8. no prior labs but mother father and pt confirm no pmh and pt and mother confirm no kidney issues.   pt now adds that 3 days ago he had a left axillary abscess drained at  and was put on doxy x 3 days    Dr. Patel consulted (nephro) and endorsed to Dr. Juarez for admission

## 2024-03-05 NOTE — ED ADULT NURSE REASSESSMENT NOTE - NS ED NURSE REASSESS COMMENT FT1
Patient at this time in no acute distress, AOx4, awaiting reassessment, resident on [id number] made aware, awaiting orders.

## 2024-03-05 NOTE — H&P ADULT - NSICDXFAMILYHX_GEN_ALL_CORE_FT
FAMILY HISTORY:  Father  Still living? Unknown  FH: hyperlipidemia, Age at diagnosis: Age Unknown  FH: type 2 diabetes, Age at diagnosis: Age Unknown    Mother  Still living? Unknown  FH: hyperlipidemia, Age at diagnosis: Age Unknown

## 2024-03-05 NOTE — H&P ADULT - NSHPREVIEWOFSYSTEMS_GEN_ALL_CORE
REVIEW OF SYSTEMS:  CONSTITUTIONAL:+ fever/chills or no appetite changes.  HENMT: No HA, lightheadedness/dizziness  RESPIRATORY: No cough, wheezing, hemoptysis; No shortness of breath.  CARDIOVASCULAR: No chest pain, palpitations.  GASTROINTESTINAL:+ abdominal or epigastric pain. + nausea or vomiting; + diarrhea or no constipation.  GENITOURINARY: No dysuria, changes in frequency, hematuria, or incontinence  NEUROLOGICAL: Baseline strength. Sensation intact bilaterally.  SKIN: + itching, general  rashes  MUSCULOSKELETAL: No joint pain or swelling; No muscle, back, or extremity pain REVIEW OF SYSTEMS:  CONSTITUTIONAL:+ fever/chills or no appetite changes. + sore throat  HENMT: No HA, lightheadedness/dizziness  RESPIRATORY: No cough, wheezing, hemoptysis; No shortness of breath.  CARDIOVASCULAR: No chest pain, palpitations.  GASTROINTESTINAL:+ abdominal or epigastric pain. + nausea or vomiting; + diarrhea or no constipation.  GENITOURINARY: No dysuria, changes in frequency, hematuria, or incontinence  NEUROLOGICAL: Baseline strength. Sensation intact bilaterally.  SKIN: + itching, general  rashes  MUSCULOSKELETAL: No joint pain or swelling; No muscle, back, or extremity pain

## 2024-03-05 NOTE — CONSULT NOTE ADULT - ASSESSMENT
ASSESSMENT & PLAN:  22yo male with PMHx of axillary abscesses and no past surgical history presenting to ED with mother present at bedside for intermittent fever, generalized body ache, nausea/vomiting admitted for NATALIA and sepsis. CT of abd and pelvis shows appendix slightly thickened measuring 8 mm in caliber without adjacent stranding. Appendicolith in the proximal appendix.       - No inflammatory changes of appendix seen on CT with no adjacent stranding, and appendicolith present therefore no acute surgical intervention at this time  - Care per primary team to treat NATALIA and sepsis  - Discussed with Dr. Mckinley

## 2024-03-05 NOTE — PROGRESS NOTE ADULT - SUBJECTIVE AND OBJECTIVE BOX
Renal consult called for NATALIA. Chart reviewed. Will get kidney and bladder sonogram. IV hydration.   Check UA. Full consult to follow.

## 2024-03-05 NOTE — H&P ADULT - PROBLEM SELECTOR PLAN 2
NATALIA (on CKD) likely 2/2 pre-renal azotemia in the setting of dehydration + doxycyline usage ?  - Baseline creatinine unknown  - Creatinine Currently 4.8  - IVF NS @ 100 cc/hr    - Monitor BMP  - f/u renal us  - FeNa/FeUrea, UA  - Avoid nephrotoxic medications as able  - Nephrology (Dr. Patel) consulted, f/u recs

## 2024-03-05 NOTE — H&P ADULT - HISTORY OF PRESENT ILLNESS
22 yo male with no significant pmh presents for fever, chills, nausea for >3 days.    Patient states that since Saturday, he has been having significant rigors, fevers, and has been feeling unwell. He had a left axiliary node drainage a few days ago by urgent care, after noticing that he had a skin infection with purulent drainage located near his left axilla. He was started on doxycyline outpatient for the skin infection, but has had persistent fevers. His last temperature was 102.6F and he alternated between Tylenol and ibuprofen but was not able to get his temperature down. He has a history of multiple skin infections and has tolerated doxycyline in the past. Patient also endorses a generalized skin rash since Saturday, nausea with 2 episodes of NBNB emesis, and loose stool. He also endorses generalized abdominal pain.    Of note, patient also complains of left shoulder pain and rates it 7/10. He states he has history of prior dislocations and movement of the shoulder increases pain.     Patient denies  HA,  CP, SOB, Dysuria, Leg pain, or Leg swelling.    Patient denies any recent travel, sick contacts, or long periods of immobilization.    IN THE ED:  Temp   97.9F ,  ,  /60  ,RR 16 , SpO2 98%  S/P bentyl 20mg x1, pepcid 20mg x1, zofran 4mg x1, ns 1l x1  EKG:  pending  Labs significant for wbc 12.84, 43% band, Na 134, Cr 4.8, lactate 8.6, magnesium 0.8  Imaging: CXR: No acute finding.

## 2024-03-05 NOTE — ED PROVIDER NOTE - PHYSICAL EXAMINATION
Constitutional: Awake, Alert. NAD. Tired appearing, well nourished. Cooperative. VSS.  HEAD: NC/AT; no signs of trauma  EYES: Conjunctiva and sclera are clear bilaterally. EOMI. PERRL. No nystagmus.  ENT: MMM. No rhinorrhea, normal pharynx, oropharynx patent, no tonsillar exudates or enlargement, no erythema, no drooling or stridor.   NECK: FROM. Supple. No nuchal rigidity. No midline or paraspinal TTP.  CARDIOVASCULAR: RRR, Normal S1, S2. No audible murmurs. No chest wall ttp. Radial pulses +2 B/L.  RESPIRATORY: Speaking full sentences. Normal respiratory effort; breath sounds CTAB, No WRR. No accessory muscle use.   ABDOMEN: Soft; NTND. No CVAT B/L. +BS x4 quadrants.  EXTREMITIES: Full active ROM in all extremities; no deformities; non-tender to palpation; no edema, no crepitus or step off;  distal pulses palpable and symmetric.  SKIN: Warm, dry; good skin turgor, no apparent lesions or rashes, no ecchymosis, brisk capillary refill.  NEURO: AAOx3 follows commands. No facial droop. No truncal ataxia. Moving all extremities spontaneously. Sensation intact B/L.

## 2024-03-05 NOTE — ED ADULT TRIAGE NOTE - CHIEF COMPLAINT QUOTE
Patient is from home. As per EMT patient may had seizure. Patient is alert and awake now. Was seen at urgent care yesterday for fever. Covid and Flu are negative at urgent care.

## 2024-03-05 NOTE — CHART NOTE - NSCHARTNOTEFT_GEN_A_CORE
Called by RN for inability to obtain BP. Per RN, automatic BP cuff reading 80s/60s, and unable to obtain a manual. Pt seen and examined at bedside, has no complaints. Denies headaches, changes in vision, dizziness.         T(C): 36.6 (03-05-24 @ 11:54), Max: 36.6 (03-05-24 @ 11:54)  HR: 108 (03-05-24 @ 11:54) (108 - 108)  BP: 120/60 (03-05-24 @ 11:54) (120/60 - 120/60)  RR: 16 (03-05-24 @ 11:54) (16 - 16)  SpO2: 98% (03-05-24 @ 11:54) (98% - 98%)  Wt(kg): --    Physical :  Gen- Non-toxic appearing, ncat  Cardio - s+1,s+2, rrr, no murmur  Lung - cta b/l, no wheeze, no rhonchi, no rales   Abdomen- +BS, NT/ND, no guarding, no rebound, no masses  Ext- no edema, 2+ pulses b/l  Neuro- A&Ox3, no focal deficits. Answers questions appropriately. Follows commands.    LABS:                        14.6   12.84 )-----------( 209      ( 05 Mar 2024 12:42 )             43.0     03-05    134<L>  |  97  |  29<H>  ----------------------------<  156<H>  4.3   |  21<L>  |  4.80<H>    Ca    8.7      05 Mar 2024 12:42  Mg     0.8     03-05    TPro  6.7  /  Alb  2.6<L>  /  TBili  2.7<H>  /  DBili  x   /  AST  42<H>  /  ALT  59  /  AlkPhos  84  03-05      Urinalysis Basic - ( 05 Mar 2024 12:42 )    Color: x / Appearance: x / SG: x / pH: x  Gluc: 156 mg/dL / Ketone: x  / Bili: x / Urobili: x   Blood: x / Protein: x / Nitrite: x   Leuk Esterase: x / RBC: x / WBC x   Sq Epi: x / Non Sq Epi: x / Bacteria: x        CARDIAC MARKERS ( 05 Mar 2024 12:42 )  x     / x     / 235 U/L / x     / x            Plan:  - Manual BP obtained 82/60--> 100/62  - Will increase rate of maintenance fluids to 125mL/hr  - STAT 1000cc bolus x1  - Remaining VSS, continue to monitor  - RN to call with any changes

## 2024-03-05 NOTE — CONSULT NOTE ADULT - ASSESSMENT
22 yo male with hx of skin infections but no other significant pmh, presents for fever, chills, nausea for x 3 days. Found to have sepsis of unclear etiology, as well as NATALIA. He has mild leukocytosis but with elevated bands 43%. COVID and flu/RSV negative. Suggest empiric antibiotics pending further evaluation.    #Sepsis  #Bandemia  #Leukocytosis  #Diarrhea  #NATALIA    -suggest vancomycin by level, cefepime (renally dosed)  -suggest doxycyline 100mg IV q12h  -suggest blood and urine cultures  -suggest CT chest and A/P  -if with loose stools send for GI PCR  -monitor WBC  -discussed with father at bedside    Thank you for courtesy of this consult.     Will follow.  Discussed with the primary team.     Mary Pitts MD  Division of Infectious Diseases   Cell [phone] between 8am and 6pm   After 6pm and weekends please call ID service at [phone].     55 minutes spent on total encounter assessing patient, examination, chart review, counseling and coordinating care by the attending physician/nurse/care manager.  24 yo male with hx of skin infections but no other significant pmh, presents for fever, chills, nausea for x 3 days. Found to have sepsis of unclear etiology, as well as NATALIA. He has mild leukocytosis but with elevated bands 43%. COVID and flu/RSV negative. Suggest empiric antibiotics pending further evaluation.    #Sepsis  #Bandemia  #Leukocytosis  #Diarrhea  #NATALIA    -suggest vancomycin by level, cefepime (renally dosed)  -suggest doxycyline 100mg IV q12h  -suggest blood and urine cultures  -suggest CT chest and A/P  -if with loose stools send for GI PCR  -monitor WBC  -discussed with father at bedside    Thank you for courtesy of this consult.     Will follow.  Discussed with the primary team.     Mary Pitts MD  Division of Infectious Diseases   Cell [phone] between 8am and 6pm   After 6pm and weekends please call ID service at [phone].     75 minutes spent on total encounter assessing patient, examination, chart review, counseling and coordinating care by the attending physician/nurse/care manager.

## 2024-03-05 NOTE — H&P ADULT - PROBLEM SELECTOR PLAN 1
Patient meets sepsis criteria on admission: WBC >12,  >10% bands, HR >90,  likely 2/2 skin infection  - CXR: no acute finding  - Given IV NS 1 L bolus x1 in ED  - Continue IV NS @ 100 cc/hr    - Lactate 8.6 on admission, f/u repeat lactate (20:00)  - Trend WBC and monitor for fever  - Tylenol 650mg for fever and mild pain q6  - Will likely start vancomycin after ID recs  - F/u UA, UCx, BCx x2  - ID (Dr. Pitts) consulted, f/u recs Patient meets sepsis criteria on admission: WBC >12,  >10% bands, HR >90,  likely 2/2 skin infection  - CXR: no acute finding  - f/u ct a/p and chest  - Given IV NS 1 L bolus x1 in ED  - Continue IV NS @ 100 cc/hr    - Lactate 8.6 on admission, f/u repeat lactate (20:00)  - Trend WBC and monitor for fever  - Tylenol 650mg for fever and mild pain q6  -  start with vancomycin and cefepime with florastor   - F/u UA, UCx, BCx x2  - ID (Dr. Pitts) consulted, f/u recs

## 2024-03-05 NOTE — H&P ADULT - ASSESSMENT
24 yo male with no significant pmh presents for fever, chills, nausea for >3 days admitted for sepsis and jasmin.

## 2024-03-05 NOTE — H&P ADULT - NSHPSOCIALHISTORY_GEN_ALL_CORE
Tobacco: denies  EtOH: denies  Recreational drug use: denies  Lives with: parents  Ambulates: independent  ADLs: independent

## 2024-03-05 NOTE — CONSULT NOTE ADULT - SUBJECTIVE AND OBJECTIVE BOX
SURGERY PA CONSULT NOTE:      HPI:  24yo male with PMHx of axillary abscesses and no past surgical history presenting to ED with mother present at bedside for intermittent fever, generalized body ache, nausea/vomiting admitted for NATALIA and sepsis. Pt reports he has had a history of axillary abscesses for the last few years for which he was prescribed doxycycline in the past by PCP and the abscess self-resolve. States yesterday abscess began to drain, went to urgent care who "squeezed" the abscess and prescribed doxycycline. Reports associated fever came back yesterday w/ max temp of 102.6, endorses generalized body aches, fatigue, diarrhea and decreased appetite. Patient denies any sick contacts. Patient denies CP, SOB, or any other complaints.      PAST MEDICAL HISTORY:  No pertinent past medical history    PAST SURGICAL HISTORY:  No significant past surgical history      REVIEW OF SYSTEMS:  General/Constitutional: Admits fatigue and weakness, no headache   HEENT: Denies auditory or visual changes/disturbances, no vertigo, no throat pain, no dysphagia  Respiratory: Denies SOB  Cardiac: Denies chest pain  Abdomen: See HPI.  Extremities: Denies swelling  Genitourinary: Denies urinary issues or complaints      MEDICATIONS:    MEDICATIONS  (STANDING):  cefepime   IVPB      doxycycline IVPB      lidocaine   4% Patch 1 Patch Transdermal daily  saccharomyces boulardii 250 milliGRAM(s) Oral two times a day  sodium chloride 0.9%. 1000 milliLiter(s) (100 mL/Hr) IV Continuous <Continuous>  vancomycin  IVPB 1250 milliGRAM(s) IV Intermittent once    MEDICATIONS  (PRN):  acetaminophen     Tablet .. 650 milliGRAM(s) Oral every 6 hours PRN Temp greater or equal to 38C (100.4F)  acetaminophen     Tablet .. 650 milliGRAM(s) Oral every 6 hours PRN Mild Pain (1 - 3)  melatonin 3 milliGRAM(s) Oral at bedtime PRN Insomnia  ondansetron Injectable 4 milliGRAM(s) IV Push every 4 hours PRN Nausea and/or Vomiting  traMADol 25 milliGRAM(s) Oral every 6 hours PRN Severe Pain (7 - 10)      ALLERGIES:    No Known Allergies      SOCIAL HISTORY:  Social History:  Tobacco: denies  EtOH: denies  Recreational drug use: denies  Lives with: parents  Ambulates: independent  ADLs: independent (05 Mar 2024 14:42)      FAMILY HISTORY:  FH: type 2 diabetes (Father)    FH: hyperlipidemia (Father, Mother)        VITAL SIGNS:  Vital Signs Last 24 Hrs  T(C): 36.6 (05 Mar 2024 11:54), Max: 36.6 (05 Mar 2024 11:54)  T(F): 97.9 (05 Mar 2024 11:54), Max: 97.9 (05 Mar 2024 11:54)  HR: 108 (05 Mar 2024 11:54) (108 - 108)  BP: 120/60 (05 Mar 2024 11:54) (120/60 - 120/60)  RR: 16 (05 Mar 2024 11:54) (16 - 16)  SpO2: 98% (05 Mar 2024 11:54) (98% - 98%)    Parameters below as of 05 Mar 2024 11:54  Patient On (Oxygen Delivery Method): room air        PHYSICAL EXAM:  General: No acute distress  HEENT: Normal cephalic/atraumatic  Chest: Nonlabored respirations  Abdomen: Soft, minimal tenderness in lower quadrants  Extremity: No swelling  Neuro: Alert and oriented x3  Skin: Good color, turgor, texture with no gross lesions, no eruptions, no rashes, no subcutaneous nodules and normal temperature.       LABS:                        14.6   12.84 )-----------( 209      ( 05 Mar 2024 12:42 )             43.0     03-05    134<L>  |  97  |  29<H>  ----------------------------<  156<H>  4.3   |  21<L>  |  4.80<H>    Ca    8.7      05 Mar 2024 12:42  Mg     0.8     03-05    TPro  6.7  /  Alb  2.6<L>  /  TBili  2.7<H>  /  DBili  x   /  AST  42<H>  /  ALT  59  /  AlkPhos  84  03-05      Urinalysis Basic - ( 05 Mar 2024 12:42 )    Color: x / Appearance: x / SG: x / pH: x  Gluc: 156 mg/dL / Ketone: x  / Bili: x / Urobili: x   Blood: x / Protein: x / Nitrite: x   Leuk Esterase: x / RBC: x / WBC x   Sq Epi: x / Non Sq Epi: x / Bacteria: x      LIVER FUNCTIONS - ( 05 Mar 2024 12:42 )  Alb: 2.6 g/dL / Pro: 6.7 g/dL / ALK PHOS: 84 U/L / ALT: 59 U/L / AST: 42 U/L / GGT: x               RADIOLOGY & ADDITIONAL STUDIES:    < from: CT Abdomen and Pelvis No Cont (03.05.24 @ 15:51) >  BOWEL: No bowel obstruction. Appendix slightly thickened measuring 8 mm   in caliber without adjacent stranding. Appendicolith in the proximal   appendix. Apparent focal distal esophageal mural thickening.  PERITONEUM: No free air or ascites.  VESSELS: Within normal limits.  RETROPERITONEUM/LYMPH NODES: No lymphadenopathy.  ABDOMINAL WALL: Small fat-containing umbilical hernia. Small  fat-containing bilateral inguinal hernias.  BONES: Within normal limits.    IMPRESSION:  No evidence for pulmonary consolidation or infiltrate.    Mildly enlarged left axillary lymph node    Appendix slightly thickened measuring 8 mm in caliber without adjacent   stranding. Appendicolith in the proximal appendix. Findings are equivocal   for acute appendicitis. Clinical correlation and continued clinical   observation are recommended.    Apparent focal distal esophageal mural thickening. EGD may be pursued for   further evaluation.    < end of copied text >

## 2024-03-05 NOTE — ED ADULT NURSE NOTE - OBJECTIVE STATEMENT
Patient received complaining of nausea, vomiting, fevers x3 days, as per EMS had possible seizure, noticed shaking briefly but was AOx4, in no acute distress at this time, denies h/x of seizures. Patient is AOx4, safety precautions in place, awaiting evaluation Patient received complaining of nausea, vomiting, fevers x3 days, as per EMS had possible seizure, noticed shaking briefly but was AOx4 upon arrival, in no acute distress at this time, denies h/x of seizures. States some generalized redness to upper torso with mild itching, denies eating/touching/coming into contact with new materials/food recently. Denies any trouble breathing/eating/swallowing. Patient is AOx4, safety precautions in place, awaiting evaluation

## 2024-03-05 NOTE — CONSULT NOTE ADULT - NS ATTEND AMEND GEN_ALL_CORE FT
Labs and imaging personally reviewed.  Findings discussed with PA as documented above.  Non contrast CT scan given profoundly elevated Cr and Acute renal failure.  8mm appendix is really unremarkable however there does appear to be hyperdense material near base suspicious for appendicolith.   Without gross distention or periappendiceal stranding, Acute appendicitis is unlikely.  Continue medical management and resuscitation.  Consider repeat CT with contrast once kidney function improves if still clinically suspicious of appendicitis.

## 2024-03-05 NOTE — ED PROVIDER NOTE - CLINICAL SUMMARY MEDICAL DECISION MAKING FREE TEXT BOX
22 yo M no pmh bibems for malaise, n/v/d. per ems seizure but emt's were unsure if it was a seizure as pt shook but was immediately talking and baseline. pt and father deny h/o seizures.   vss  tired appearing    likely viral syndrome, gastroenteritis; consider pna, uti, dehydration, electrolyte abnl  will get labs, ivf, zofran, cxr, ua, labs, reassess

## 2024-03-05 NOTE — ED PROVIDER NOTE - OBJECTIVE STATEMENT
22 yo M no pmh bibems for fever x 3 days, nausea, nbnb vomiting (but with pink streak) (1 episode today, last was 2 days ago), nb diarrhea. denies recent travel sick contacts. EMS states possible seizure en route but the emt states unsure if it was a seizure as pt shook briefly and was baseline immediately thereafter speaking. 22 yo M no pmh bibems for fever x 3 days, nausea, nbnb vomiting (but with pink streak) (1 episode today, last was 2 days ago), nb diarrhea. denies recent travel sick contacts. EMS states possible seizure en route but the emt states unsure if it was a seizure as pt shook briefly and was baseline immediately thereafter speaking.  mother and father at bedside at different times  pt laters adds that 3 days ago he had a left axillary abscess drained at  and was put on doxycycline.

## 2024-03-06 ENCOUNTER — TRANSCRIPTION ENCOUNTER (OUTPATIENT)
Age: 24
End: 2024-03-06

## 2024-03-06 ENCOUNTER — RESULT REVIEW (OUTPATIENT)
Age: 24
End: 2024-03-06

## 2024-03-06 LAB
A1C WITH ESTIMATED AVERAGE GLUCOSE RESULT: 5.4 % — SIGNIFICANT CHANGE UP (ref 4–5.6)
ALBUMIN SERPL ELPH-MCNC: 2 G/DL — LOW (ref 3.3–5)
ALP SERPL-CCNC: 68 U/L — SIGNIFICANT CHANGE UP (ref 40–120)
ALT FLD-CCNC: 55 U/L — SIGNIFICANT CHANGE UP (ref 12–78)
ANION GAP SERPL CALC-SCNC: 11 MMOL/L — SIGNIFICANT CHANGE UP (ref 5–17)
ANION GAP SERPL CALC-SCNC: 11 MMOL/L — SIGNIFICANT CHANGE UP (ref 5–17)
APPEARANCE UR: ABNORMAL
APTT BLD: 43 SEC — HIGH (ref 24.5–35.6)
AST SERPL-CCNC: 37 U/L — SIGNIFICANT CHANGE UP (ref 15–37)
B BURGDOR C6 AB SER-ACNC: NEGATIVE — SIGNIFICANT CHANGE UP
B BURGDOR IGG+IGM SER-ACNC: 0.05 INDEX — SIGNIFICANT CHANGE UP (ref 0.01–0.89)
BACTERIA # UR AUTO: ABNORMAL /HPF
BASOPHILS # BLD AUTO: 0.1 K/UL — SIGNIFICANT CHANGE UP (ref 0–0.2)
BASOPHILS NFR BLD AUTO: 0.6 % — SIGNIFICANT CHANGE UP (ref 0–2)
BILIRUB SERPL-MCNC: 3.1 MG/DL — HIGH (ref 0.2–1.2)
BILIRUB UR-MCNC: ABNORMAL
BUN SERPL-MCNC: 41 MG/DL — HIGH (ref 7–23)
BUN SERPL-MCNC: 55 MG/DL — HIGH (ref 7–23)
CALCIUM SERPL-MCNC: 7.3 MG/DL — LOW (ref 8.5–10.1)
CALCIUM SERPL-MCNC: 7.4 MG/DL — LOW (ref 8.5–10.1)
CHLORIDE SERPL-SCNC: 100 MMOL/L — SIGNIFICANT CHANGE UP (ref 96–108)
CHLORIDE SERPL-SCNC: 103 MMOL/L — SIGNIFICANT CHANGE UP (ref 96–108)
CHLORIDE UR-SCNC: <20 MMOL/L — SIGNIFICANT CHANGE UP
CHOLEST SERPL-MCNC: 88 MG/DL — SIGNIFICANT CHANGE UP
CO2 SERPL-SCNC: 19 MMOL/L — LOW (ref 22–31)
CO2 SERPL-SCNC: 20 MMOL/L — LOW (ref 22–31)
COLOR SPEC: SIGNIFICANT CHANGE UP
COMMENT - URINE: SIGNIFICANT CHANGE UP
CREAT ?TM UR-MCNC: 160 MG/DL — SIGNIFICANT CHANGE UP
CREAT SERPL-MCNC: 4.8 MG/DL — HIGH (ref 0.5–1.3)
CREAT SERPL-MCNC: 4.8 MG/DL — HIGH (ref 0.5–1.3)
D DIMER BLD IA.RAPID-MCNC: 2890 NG/ML DDU — HIGH
DIFF PNL FLD: ABNORMAL
EGFR: 16 ML/MIN/1.73M2 — LOW
EGFR: 16 ML/MIN/1.73M2 — LOW
EOSINOPHIL # BLD AUTO: 0.45 K/UL — SIGNIFICANT CHANGE UP (ref 0–0.5)
EOSINOPHIL NFR BLD AUTO: 2.5 % — SIGNIFICANT CHANGE UP (ref 0–6)
EPI CELLS # UR: PRESENT
ESTIMATED AVERAGE GLUCOSE: 108 MG/DL — SIGNIFICANT CHANGE UP (ref 68–114)
FIBRINOGEN PPP-MCNC: 717 MG/DL — HIGH (ref 200–475)
GLUCOSE SERPL-MCNC: 132 MG/DL — HIGH (ref 70–99)
GLUCOSE SERPL-MCNC: 89 MG/DL — SIGNIFICANT CHANGE UP (ref 70–99)
GLUCOSE UR QL: NEGATIVE MG/DL — SIGNIFICANT CHANGE UP
GRAN CASTS # UR COMP ASSIST: PRESENT
HCT VFR BLD CALC: 35.6 % — LOW (ref 39–50)
HCT VFR BLD CALC: 36.4 % — LOW (ref 39–50)
HDLC SERPL-MCNC: 9 MG/DL — LOW
HGB BLD-MCNC: 12.6 G/DL — LOW (ref 13–17)
HGB BLD-MCNC: 12.9 G/DL — LOW (ref 13–17)
HIV 1+2 AB+HIV1 P24 AG SERPL QL IA: SIGNIFICANT CHANGE UP
IMM GRANULOCYTES NFR BLD AUTO: 13.7 % — HIGH (ref 0–0.9)
INR BLD: 1.39 RATIO — HIGH (ref 0.85–1.18)
KETONES UR-MCNC: ABNORMAL MG/DL
LACTATE SERPL-SCNC: 4.2 MMOL/L — CRITICAL HIGH (ref 0.7–2)
LACTATE SERPL-SCNC: 4.5 MMOL/L — CRITICAL HIGH (ref 0.7–2)
LEUKOCYTE ESTERASE UR-ACNC: ABNORMAL
LIPID PNL WITH DIRECT LDL SERPL: 20 MG/DL — SIGNIFICANT CHANGE UP
LYMPHOCYTES # BLD AUTO: 0.22 K/UL — LOW (ref 1–3.3)
LYMPHOCYTES # BLD AUTO: 1.2 % — LOW (ref 13–44)
MAGNESIUM SERPL-MCNC: 1 MG/DL — CRITICAL LOW (ref 1.6–2.6)
MAGNESIUM SERPL-MCNC: 1.9 MG/DL — SIGNIFICANT CHANGE UP (ref 1.6–2.6)
MCHC RBC-ENTMCNC: 29.2 PG — SIGNIFICANT CHANGE UP (ref 27–34)
MCHC RBC-ENTMCNC: 29.6 PG — SIGNIFICANT CHANGE UP (ref 27–34)
MCHC RBC-ENTMCNC: 35.4 GM/DL — SIGNIFICANT CHANGE UP (ref 32–36)
MCHC RBC-ENTMCNC: 35.4 GM/DL — SIGNIFICANT CHANGE UP (ref 32–36)
MCV RBC AUTO: 82.6 FL — SIGNIFICANT CHANGE UP (ref 80–100)
MCV RBC AUTO: 83.5 FL — SIGNIFICANT CHANGE UP (ref 80–100)
MONOCYTES # BLD AUTO: 0.22 K/UL — SIGNIFICANT CHANGE UP (ref 0–0.9)
MONOCYTES NFR BLD AUTO: 1.2 % — LOW (ref 2–14)
MRSA PCR RESULT.: SIGNIFICANT CHANGE UP
NEUTROPHILS # BLD AUTO: 14.6 K/UL — HIGH (ref 1.8–7.4)
NEUTROPHILS NFR BLD AUTO: 80.8 % — HIGH (ref 43–77)
NITRITE UR-MCNC: NEGATIVE — SIGNIFICANT CHANGE UP
NON HDL CHOLESTEROL: 80 MG/DL — SIGNIFICANT CHANGE UP
NRBC # BLD: 0 /100 WBCS — SIGNIFICANT CHANGE UP (ref 0–0)
NRBC # BLD: 0 /100 WBCS — SIGNIFICANT CHANGE UP (ref 0–0)
OSMOLALITY UR: 329 MOSM/KG — SIGNIFICANT CHANGE UP (ref 50–1200)
PH UR: 5 — SIGNIFICANT CHANGE UP (ref 5–8)
PHOSPHATE SERPL-MCNC: 2.3 MG/DL — LOW (ref 2.5–4.5)
PHOSPHATE SERPL-MCNC: 2.7 MG/DL — SIGNIFICANT CHANGE UP (ref 2.5–4.5)
PLATELET # BLD AUTO: 120 K/UL — LOW (ref 150–400)
PLATELET # BLD AUTO: 145 K/UL — LOW (ref 150–400)
POTASSIUM SERPL-MCNC: 4.3 MMOL/L — SIGNIFICANT CHANGE UP (ref 3.5–5.3)
POTASSIUM SERPL-MCNC: 4.6 MMOL/L — SIGNIFICANT CHANGE UP (ref 3.5–5.3)
POTASSIUM SERPL-SCNC: 4.3 MMOL/L — SIGNIFICANT CHANGE UP (ref 3.5–5.3)
POTASSIUM SERPL-SCNC: 4.6 MMOL/L — SIGNIFICANT CHANGE UP (ref 3.5–5.3)
POTASSIUM UR-SCNC: 58.7 MMOL/L — SIGNIFICANT CHANGE UP
PROT ?TM UR-MCNC: 65 MG/DL — HIGH (ref 0–12)
PROT SERPL-MCNC: 5.5 G/DL — LOW (ref 6–8.3)
PROT UR-MCNC: 30 MG/DL
PROT/CREAT UR-RTO: 0.4 RATIO — HIGH (ref 0–0.2)
PROTHROM AB SERPL-ACNC: 16.1 SEC — HIGH (ref 9.5–13)
RBC # BLD: 4.31 M/UL — SIGNIFICANT CHANGE UP (ref 4.2–5.8)
RBC # BLD: 4.36 M/UL — SIGNIFICANT CHANGE UP (ref 4.2–5.8)
RBC # FLD: 12.9 % — SIGNIFICANT CHANGE UP (ref 10.3–14.5)
RBC # FLD: 13 % — SIGNIFICANT CHANGE UP (ref 10.3–14.5)
RBC CASTS # UR COMP ASSIST: 3 /HPF — SIGNIFICANT CHANGE UP (ref 0–4)
S AUREUS DNA NOSE QL NAA+PROBE: SIGNIFICANT CHANGE UP
SODIUM SERPL-SCNC: 131 MMOL/L — LOW (ref 135–145)
SODIUM SERPL-SCNC: 133 MMOL/L — LOW (ref 135–145)
SODIUM UR-SCNC: <20 MMOL/L — SIGNIFICANT CHANGE UP
SP GR SPEC: 1.02 — SIGNIFICANT CHANGE UP (ref 1–1.03)
TRIGL SERPL-MCNC: 433 MG/DL — HIGH
TRIGL SERPL-MCNC: 474 MG/DL — HIGH
UROBILINOGEN FLD QL: 1 MG/DL — SIGNIFICANT CHANGE UP (ref 0.2–1)
UUN UR-MCNC: 371 MG/DL — SIGNIFICANT CHANGE UP
VANCOMYCIN TROUGH SERPL-MCNC: 21 UG/ML — HIGH (ref 10–20)
WBC # BLD: 18.07 K/UL — HIGH (ref 3.8–10.5)
WBC # BLD: 21.76 K/UL — HIGH (ref 3.8–10.5)
WBC # FLD AUTO: 18.07 K/UL — HIGH (ref 3.8–10.5)
WBC # FLD AUTO: 21.76 K/UL — HIGH (ref 3.8–10.5)
WBC UR QL: 32 /HPF — HIGH (ref 0–5)

## 2024-03-06 PROCEDURE — 99291 CRITICAL CARE FIRST HOUR: CPT | Mod: GC

## 2024-03-06 PROCEDURE — 99232 SBSQ HOSP IP/OBS MODERATE 35: CPT

## 2024-03-06 PROCEDURE — 93010 ELECTROCARDIOGRAM REPORT: CPT

## 2024-03-06 PROCEDURE — 76882 US LMTD JT/FCL EVL NVASC XTR: CPT | Mod: 26,LT

## 2024-03-06 PROCEDURE — 93306 TTE W/DOPPLER COMPLETE: CPT | Mod: 26

## 2024-03-06 RX ORDER — CEFEPIME 1 G/1
1000 INJECTION, POWDER, FOR SOLUTION INTRAMUSCULAR; INTRAVENOUS EVERY 12 HOURS
Refills: 0 | Status: DISCONTINUED | OUTPATIENT
Start: 2024-03-06 | End: 2024-03-06

## 2024-03-06 RX ORDER — PHENYLEPHRINE HYDROCHLORIDE 10 MG/ML
0.1 INJECTION INTRAVENOUS
Qty: 40 | Refills: 0 | Status: DISCONTINUED | OUTPATIENT
Start: 2024-03-06 | End: 2024-03-07

## 2024-03-06 RX ORDER — LIDOCAINE 4 G/100G
2 CREAM TOPICAL EVERY 24 HOURS
Refills: 0 | Status: DISCONTINUED | OUTPATIENT
Start: 2024-03-06 | End: 2024-03-09

## 2024-03-06 RX ORDER — MAGNESIUM SULFATE 500 MG/ML
2 VIAL (ML) INJECTION ONCE
Refills: 0 | Status: COMPLETED | OUTPATIENT
Start: 2024-03-06 | End: 2024-03-06

## 2024-03-06 RX ORDER — HEPARIN SODIUM 5000 [USP'U]/ML
5000 INJECTION INTRAVENOUS; SUBCUTANEOUS EVERY 8 HOURS
Refills: 0 | Status: DISCONTINUED | OUTPATIENT
Start: 2024-03-06 | End: 2024-03-08

## 2024-03-06 RX ORDER — VANCOMYCIN HCL 1 G
1250 VIAL (EA) INTRAVENOUS EVERY 8 HOURS
Refills: 0 | Status: DISCONTINUED | OUTPATIENT
Start: 2024-03-06 | End: 2024-03-06

## 2024-03-06 RX ORDER — SODIUM CHLORIDE 9 MG/ML
1000 INJECTION, SOLUTION INTRAVENOUS
Refills: 0 | Status: DISCONTINUED | OUTPATIENT
Start: 2024-03-06 | End: 2024-03-07

## 2024-03-06 RX ORDER — SODIUM CHLORIDE 9 MG/ML
500 INJECTION INTRAMUSCULAR; INTRAVENOUS; SUBCUTANEOUS ONCE
Refills: 0 | Status: COMPLETED | OUTPATIENT
Start: 2024-03-06 | End: 2024-03-06

## 2024-03-06 RX ORDER — PHENYLEPHRINE HYDROCHLORIDE 10 MG/ML
0.05 INJECTION INTRAVENOUS
Qty: 40 | Refills: 0 | Status: DISCONTINUED | OUTPATIENT
Start: 2024-03-06 | End: 2024-03-06

## 2024-03-06 RX ORDER — CEFTRIAXONE 500 MG/1
2000 INJECTION, POWDER, FOR SOLUTION INTRAMUSCULAR; INTRAVENOUS EVERY 12 HOURS
Refills: 0 | Status: DISCONTINUED | OUTPATIENT
Start: 2024-03-06 | End: 2024-03-08

## 2024-03-06 RX ORDER — SODIUM CHLORIDE 9 MG/ML
1000 INJECTION, SOLUTION INTRAVENOUS
Refills: 0 | Status: DISCONTINUED | OUTPATIENT
Start: 2024-03-06 | End: 2024-03-06

## 2024-03-06 RX ORDER — ACETAMINOPHEN 500 MG
650 TABLET ORAL ONCE
Refills: 0 | Status: COMPLETED | OUTPATIENT
Start: 2024-03-06 | End: 2024-03-06

## 2024-03-06 RX ORDER — DIPHENHYDRAMINE HCL 50 MG
50 CAPSULE ORAL ONCE
Refills: 0 | Status: DISCONTINUED | OUTPATIENT
Start: 2024-03-06 | End: 2024-03-06

## 2024-03-06 RX ORDER — MIDODRINE HYDROCHLORIDE 2.5 MG/1
10 TABLET ORAL EVERY 8 HOURS
Refills: 0 | Status: DISCONTINUED | OUTPATIENT
Start: 2024-03-06 | End: 2024-03-07

## 2024-03-06 RX ORDER — DIPHENHYDRAMINE HCL 50 MG
25 CAPSULE ORAL ONCE
Refills: 0 | Status: COMPLETED | OUTPATIENT
Start: 2024-03-06 | End: 2024-03-06

## 2024-03-06 RX ORDER — PANTOPRAZOLE SODIUM 20 MG/1
40 TABLET, DELAYED RELEASE ORAL EVERY 12 HOURS
Refills: 0 | Status: DISCONTINUED | OUTPATIENT
Start: 2024-03-06 | End: 2024-03-09

## 2024-03-06 RX ORDER — DIPHENHYDRAMINE HCL 50 MG
50 CAPSULE ORAL ONCE
Refills: 0 | Status: COMPLETED | OUTPATIENT
Start: 2024-03-06 | End: 2024-03-06

## 2024-03-06 RX ORDER — NOREPINEPHRINE BITARTRATE/D5W 8 MG/250ML
0.05 PLASTIC BAG, INJECTION (ML) INTRAVENOUS
Qty: 8 | Refills: 0 | Status: DISCONTINUED | OUTPATIENT
Start: 2024-03-06 | End: 2024-03-06

## 2024-03-06 RX ADMIN — Medication 650 MILLIGRAM(S): at 05:02

## 2024-03-06 RX ADMIN — LIDOCAINE 2 PATCH: 4 CREAM TOPICAL at 02:38

## 2024-03-06 RX ADMIN — CEFTRIAXONE 100 MILLIGRAM(S): 500 INJECTION, POWDER, FOR SOLUTION INTRAMUSCULAR; INTRAVENOUS at 21:37

## 2024-03-06 RX ADMIN — PANTOPRAZOLE SODIUM 40 MILLIGRAM(S): 20 TABLET, DELAYED RELEASE ORAL at 17:58

## 2024-03-06 RX ADMIN — MIDODRINE HYDROCHLORIDE 10 MILLIGRAM(S): 2.5 TABLET ORAL at 21:37

## 2024-03-06 RX ADMIN — ONDANSETRON 4 MILLIGRAM(S): 8 TABLET, FILM COATED ORAL at 11:34

## 2024-03-06 RX ADMIN — SODIUM CHLORIDE 75 MILLILITER(S): 9 INJECTION, SOLUTION INTRAVENOUS at 02:43

## 2024-03-06 RX ADMIN — CEFEPIME 100 MILLIGRAM(S): 1 INJECTION, POWDER, FOR SOLUTION INTRAMUSCULAR; INTRAVENOUS at 17:23

## 2024-03-06 RX ADMIN — Medication 25 GRAM(S): at 06:18

## 2024-03-06 RX ADMIN — MIDODRINE HYDROCHLORIDE 10 MILLIGRAM(S): 2.5 TABLET ORAL at 14:06

## 2024-03-06 RX ADMIN — SODIUM CHLORIDE 100 MILLILITER(S): 9 INJECTION, SOLUTION INTRAVENOUS at 21:36

## 2024-03-06 RX ADMIN — Medication 650 MILLIGRAM(S): at 23:11

## 2024-03-06 RX ADMIN — LIDOCAINE 2 PATCH: 4 CREAM TOPICAL at 13:23

## 2024-03-06 RX ADMIN — Medication 100 MILLIGRAM(S): at 05:01

## 2024-03-06 RX ADMIN — Medication 25 MILLIGRAM(S): at 02:48

## 2024-03-06 RX ADMIN — Medication 25 GRAM(S): at 15:03

## 2024-03-06 RX ADMIN — Medication 250 MILLIGRAM(S): at 05:03

## 2024-03-06 RX ADMIN — Medication 650 MILLIGRAM(S): at 05:26

## 2024-03-06 RX ADMIN — TRAMADOL HYDROCHLORIDE 25 MILLIGRAM(S): 50 TABLET ORAL at 05:02

## 2024-03-06 RX ADMIN — Medication 50 MILLIGRAM(S): at 08:53

## 2024-03-06 RX ADMIN — SODIUM CHLORIDE 100 MILLILITER(S): 9 INJECTION, SOLUTION INTRAVENOUS at 10:20

## 2024-03-06 RX ADMIN — Medication 650 MILLIGRAM(S): at 08:53

## 2024-03-06 RX ADMIN — Medication 100 MILLIGRAM(S): at 17:22

## 2024-03-06 RX ADMIN — CEFEPIME 100 MILLIGRAM(S): 1 INJECTION, POWDER, FOR SOLUTION INTRAMUSCULAR; INTRAVENOUS at 05:02

## 2024-03-06 RX ADMIN — SODIUM CHLORIDE 1000 MILLILITER(S): 9 INJECTION INTRAMUSCULAR; INTRAVENOUS; SUBCUTANEOUS at 04:31

## 2024-03-06 RX ADMIN — HEPARIN SODIUM 5000 UNIT(S): 5000 INJECTION INTRAVENOUS; SUBCUTANEOUS at 14:14

## 2024-03-06 RX ADMIN — MIDODRINE HYDROCHLORIDE 10 MILLIGRAM(S): 2.5 TABLET ORAL at 05:03

## 2024-03-06 RX ADMIN — LIDOCAINE 2 PATCH: 4 CREAM TOPICAL at 07:58

## 2024-03-06 RX ADMIN — HEPARIN SODIUM 5000 UNIT(S): 5000 INJECTION INTRAVENOUS; SUBCUTANEOUS at 21:36

## 2024-03-06 RX ADMIN — Medication 40 MILLIGRAM(S): at 08:53

## 2024-03-06 NOTE — DISCHARGE NOTE PROVIDER - HOSPITAL COURSE
HPI:  22 yo male with no significant pmh presents for fever, chills, nausea for >3 days.    Patient states that since Saturday, he has been having significant rigors, fevers, and has been feeling unwell. He had a left axiliary node drainage a few days ago by urgent care, after noticing that he had a skin infection with purulent drainage located near his left axilla. He was started on doxycyline outpatient for the skin infection, but has had persistent fevers. His last temperature was 102.6F and he alternated between Tylenol and ibuprofen but was not able to get his temperature down. He has a history of multiple skin infections and has tolerated doxycyline in the past. Patient also endorses a generalized skin rash since Saturday, nausea with 2 episodes of NBNB emesis, and loose stool. He also endorses generalized abdominal pain.    Of note, patient also complains of left shoulder pain and rates it 7/10. He states he has history of prior dislocations and movement of the shoulder increases pain.     Patient denies  HA,  CP, SOB, Dysuria, Leg pain, or Leg swelling.    Patient denies any recent travel, sick contacts, or long periods of immobilization.    IN THE ED:  Temp   97.9F ,  ,  /60  ,RR 16 , SpO2 98%  S/P bentyl 20mg x1, pepcid 20mg x1, zofran 4mg x1, ns 1l x1  EKG:  pending  Labs significant for wbc 12.84, 43% band, Na 134, Cr 4.8, lactate 8.6, magnesium 0.8  Imaging: CXR: No acute finding.     (05 Mar 2024 14:42)      ---  HOSPITAL COURSE: Patient admitted to medicine floor for management of acute sepsis. CT head negative for acute intracranial pathology. CT A/P found no evidence for pulmonary consolidation or infiltrate. Mildly enlarged left axillary lymph node. Appendix slightly thickened measuring 8 mm in caliber without adjacent stranding. Appendicolith in the proximal appendix. Findings are equivocal for acute appendicitis. Apparent focal distal esophageal mural thickening. EGD may be pursued for further evaluation.  Left axilla US ordered and showed avascular 1.8 x 0.5 x 1.3 cm heterogeneous fluid collection in the subcutaneous soft tissues, approximately 3 mm in depth from the skin surface. Regarding sepsis, ID consulted and pt was empirically stated on vancoymcin, cefepime, and doxycyline. Blood cultures sent and showed **. Given pt has pet rabbit, Borrelia burgdorferi antibodies were sent and showed **.  Urinalysis mildly positive with moderate leukocyte esterase, WBC 32 and few bacteria. Urine culture sent and showed **. Pt became hypotensive to 70s/40s and tachycardic to 130s despite 3L NS boluses. Pt taken to ICU, started on low dose levophed gtt, which was taken off. Pt was started on midodrine to wean off of pressors. TTE was ordered and showed normal systolic fx with EF 70-75%, no additional findings. Lactate initially 8.6 and improved with fluid resuscitation. Pt found to have Cr 4.80 in admission. Nephrology consulted, pt likely with ATN related to sepsis and/or prerenal azotemia from GI losses. Renal indices were monitored throughout hospital stay. Urinalysis was negative. Kidney and bladder ultrasound ordered and showed hepatic steatosis, no hydronephrosis. Triglycerides found to be elevated at 433.    Pt seen and examined on day of discharge. Patient is medically optimized for discharge to home with close outpatient followup.    PHYSICAL EXAM ON DAY OF DISCHARGE:  The patient was seen and examined on the day of discharge. Please see progress note from day of discharge for further information.         ---  CONSULTANTS:     ---  TIME SPENT:  I, the attending physician, was physically present for the key portions of the evaluation and management (E/M) service provided. The total amount of time spent reviewing the hospital notes, laboratory values, imaging findings, assessing/counseling the patient, discussing with consultant physicians, social work, nursing staff was -- minutes    ---  Primary care provider was made aware of plan for discharge:      [  ] NO     [  ] YES   HPI:  24 yo male with no significant pmh presents for fever, chills, nausea for >3 days.    Patient states that since Saturday, he has been having significant rigors, fevers, and has been feeling unwell. He had a left axiliary node drainage a few days ago by urgent care, after noticing that he had a skin infection with purulent drainage located near his left axilla. He was started on doxycyline outpatient for the skin infection, but has had persistent fevers. His last temperature was 102.6F and he alternated between Tylenol and ibuprofen but was not able to get his temperature down. He has a history of multiple skin infections and has tolerated doxycyline in the past. Patient also endorses a generalized skin rash since Saturday, nausea with 2 episodes of NBNB emesis, and loose stool. He also endorses generalized abdominal pain.    Of note, patient also complains of left shoulder pain and rates it 7/10. He states he has history of prior dislocations and movement of the shoulder increases pain.     Patient denies  HA,  CP, SOB, Dysuria, Leg pain, or Leg swelling.    Patient denies any recent travel, sick contacts, or long periods of immobilization.    IN THE ED:  Temp   97.9F ,  ,  /60  ,RR 16 , SpO2 98%  S/P bentyl 20mg x1, pepcid 20mg x1, zofran 4mg x1, ns 1l x1  EKG:  pending  Labs significant for wbc 12.84, 43% band, Na 134, Cr 4.8, lactate 8.6, magnesium 0.8  Imaging: CXR: No acute finding.     (05 Mar 2024 14:42)      ---  HOSPITAL COURSE: Patient admitted to medicine floor for management of acute sepsis. CT head negative for acute intracranial pathology. CT A/P found no evidence for pulmonary consolidation or infiltrate. Mildly enlarged left axillary lymph node. Appendix slightly thickened measuring 8 mm in caliber without adjacent stranding. Appendicolith in the proximal appendix. Findings are equivocal for acute appendicitis. Apparent focal distal esophageal mural thickening. EGD may be pursued for further evaluation.  Left axilla US ordered and showed avascular 1.8 x 0.5 x 1.3 cm heterogeneous fluid collection in the subcutaneous soft tissues, approximately 3 mm in depth from the skin surface. Regarding sepsis, ID consulted and pt was empirically stated on vancoymcin, cefepime, and doxycyline. Blood cultures sent and showed NGTD. Urinalysis mildly positive with moderate leukocyte esterase, WBC 32 and few bacteria. Pt became hypotensive to 70s/40s and tachycardic to 130s despite 3L NS boluses. Pt taken to ICU, started on low dose levophed gtt, which was taken off. Pt was started on midodrine to wean off of pressors. TTE was ordered and showed normal systolic fx with EF 70-75%, no additional findings. Lactate initially 8.6 and improved with fluid resuscitation. Pt found to have Cr 4.80 in admission. Nephrology consulted, pt likely with ATN related to sepsis and/or prerenal azotemia from GI losses. Renal indices were monitored throughout hospital stay. Urinalysis was negative. Kidney and bladder ultrasound ordered and showed hepatic steatosis, no hydronephrosis. Triglycerides found to be elevated at 433.    Pt seen and examined on day of discharge. Patient is medically optimized for discharge to home with close outpatient followup.    PHYSICAL EXAM ON DAY OF DISCHARGE:  The patient was seen and examined on the day of discharge. Please see progress note from day of discharge for further information.     T(C): 36.7 (03-09-24 @ 11:41), Max: 37.3 (03-08-24 @ 20:17)  HR: 83 (03-09-24 @ 11:41) (63 - 94)  BP: 134/85 (03-09-24 @ 11:41) (134/85 - 146/92)  RR: 18 (03-09-24 @ 11:41) (18 - 18)  SpO2: 92% (03-09-24 @ 11:41) (92% - 99%)    GENERAL: patient appears well, no acute distress, appropriate, pleasant  EYES: sclera clear, no exudates  ENMT: oropharynx clear without erythema, no exudates, moist mucous membranes  NECK: supple, soft, no thyromegaly noted  LUNGS: good air entry bilaterally, clear to auscultation, symmetric breath sounds, no wheezing or rhonchi appreciated  HEART: soft S1/S2, regular rate and rhythm, no murmurs noted, no lower extremity edema  GASTROINTESTINAL: abdomen is soft, nontender, nondistended, normoactive bowel sounds, no palpable masses  INTEGUMENT: good skin turgor, no lesions noted  MUSCULOSKELETAL: no clubbing or cyanosis, no obvious deformity  NEUROLOGIC: awake, alert, oriented x3, good muscle tone in 4 extremities, no obvious sensory deficits  PSYCHIATRIC: mood is good, affect is congruent, linear and logical thought process  HEME/LYMPH: no palpable supraclavicular nodules, no obvious ecchymosis or petechiae       ---  CONSULTANTS:   ID: Dr. Drummond  Heme/onc: Dr. Briceño    ---  TIME SPENT:  I, the attending physician, was physically present for the key portions of the evaluation and management (E/M) service provided. The total amount of time spent reviewing the hospital notes, laboratory values, imaging findings, assessing/counseling the patient, discussing with consultant physicians, social work, nursing staff was 45 minutes

## 2024-03-06 NOTE — CONSULT NOTE ADULT - ASSESSMENT
Abnormal imaging  Nausea/vomiting/diarrhea  Abdominal pain  Septic shock    CT noted and d/w patient and mother  Start PPI BID  Follow up stool studies if pt has diarrhea  Follow ID workup  Cont abx  May need eventual endoscopy, currently not optimized  Will follow    I reviewed the overnight course of events on the unit, re-confirming the patient history. I discussed the care with the patient  Differential diagnosis and plan of care discussed with patient after the evaluation  35 minutes spent on total encounter of which more than fifty percent of the encounter was spent counseling and/or coordinating care by the attending physician.

## 2024-03-06 NOTE — PROGRESS NOTE ADULT - SUBJECTIVE AND OBJECTIVE BOX
Patient is a 23y old  Male who presents with a chief complaint of Sepsis and NATALIA (06 Mar 2024 15:00)      INTERVAL HPI/OVERNIGHT EVENTS: Patient seen and examined at bedside. hypotensive overnight and taken to ICU and started on levophed.     MEDICATIONS  (STANDING):  cefepime   IVPB      cefepime   IVPB 1000 milliGRAM(s) IV Intermittent every 12 hours  cefepime   IVPB 1000 milliGRAM(s) IV Intermittent every 12 hours  dextrose 5% + sodium chloride 0.45% 1000 milliLiter(s) (100 mL/Hr) IV Continuous <Continuous>  doxycycline IVPB 100 milliGRAM(s) IV Intermittent every 12 hours  heparin   Injectable 5000 Unit(s) SubCutaneous every 8 hours  lidocaine   4% Patch 2 Patch Transdermal every 24 hours  midodrine 10 milliGRAM(s) Oral every 8 hours  pantoprazole  Injectable 40 milliGRAM(s) IV Push every 12 hours  phenylephrine    Infusion 0.1 MICROgram(s)/kG/Min (3.64 mL/Hr) IV Continuous <Continuous>    MEDICATIONS  (PRN):  acetaminophen     Tablet .. 650 milliGRAM(s) Oral every 6 hours PRN Mild Pain (1 - 3)  acetaminophen     Tablet .. 650 milliGRAM(s) Oral every 6 hours PRN Temp greater or equal to 38C (100.4F)  melatonin 3 milliGRAM(s) Oral at bedtime PRN Insomnia  ondansetron Injectable 4 milliGRAM(s) IV Push every 4 hours PRN Nausea and/or Vomiting  traMADol 25 milliGRAM(s) Oral every 6 hours PRN Severe Pain (7 - 10)      Allergies    No Known Allergies    Intolerances        REVIEW OF SYSTEMS:  CONSTITUTIONAL: No fever or chills  CARDIOVASCULAR: No chest pain, palpitations    Vital Signs Last 24 Hrs  T(C): 36.9 (06 Mar 2024 12:20), Max: 37.6 (05 Mar 2024 22:46)  T(F): 98.5 (06 Mar 2024 12:20), Max: 99.7 (05 Mar 2024 22:46)  HR: 127 (06 Mar 2024 16:00) (121 - 159)  BP: 100/49 (06 Mar 2024 16:00) (70/40 - 127/58)  BP(mean): 71 (06 Mar 2024 16:00) (49 - 88)  RR: 23 (06 Mar 2024 16:00) (18 - 61)  SpO2: 95% (06 Mar 2024 16:00) (93% - 99%)    Parameters below as of 06 Mar 2024 01:10  Patient On (Oxygen Delivery Method): room air      I&O's Summary    05 Mar 2024 07:01  -  06 Mar 2024 07:00  --------------------------------------------------------  IN: 1078.2 mL / OUT: 400 mL / NET: 678.2 mL    06 Mar 2024 07:01  -  06 Mar 2024 16:39  --------------------------------------------------------  IN: 750 mL / OUT: 800 mL / NET: -50 mL      BMI (kg/m2): 32.5 (-24 @ 01:10)    PHYSICAL EXAM:  GENERAL: NAD  HEENT:  AT/NC, anicteric, moist mucous membranes, EOMI, PERRL, no lid-lag, conjunctiva and sclera clear  CHEST/LUNG:  CTA b/l, no rales, wheezes, or rhonchi,  normal respiratory effort, no intercostal retractions  HEART:  RRR, S1, S2, no murmurs; no pitting edema  ABDOMEN:  BS+, soft, nontender, nondistended  MSK/EXTREMITIES: palpable peripheral pulses, no clubbing or cyanosis; diffuse rash on extremities   NERVOUS SYSTEM: answers questions and follows commands appropriately, A&Ox3 grossly moves all extremities   PSYCH: Appropriate affect, Alert & Awake; Good judgement      LABS: Personally reviewed  CBC                        12.9   18.07 )-----------( 145      ( 06 Mar 2024 04:30 )             36.4     CMP  -    133  |  103  |  41  ----------------------------<  89  4.6   |  19  |  4.80    Ca    7.4      06 Mar 2024 04:30  Phos  2.3       Mg     1.0         TPro  5.5  /  Alb  2.0  /  TBili  3.1  /  DBili  x   /  AST  37  /  ALT  55  /  AlkPhos  68        Lipase: 53 U/L (24 @ 12:42)      PT/INR - ( 06 Mar 2024 09:38 )   PT: 16.1 sec;   INR: 1.39 ratio         PTT - ( 06 Mar 2024 09:38 )  PTT:43.0 sec  Lactate, Blood: 4.2 mmol/L ( @ 04:30)  Lactate, Blood: 4.5 mmol/L ( @ 00:46)  Lactate, Blood: 5.8 mmol/L ( @ 20:14)  Lactate, Blood: 8.6 mmol/L ( @ 12:42)      CARDIAC MARKERS ( 05 Mar 2024 12:42 )  x     / x     / 235 U/L / x     / x           Chol 88 mg/dL LDL -- HDL 9 mg/dL Trig 433 mg/dL                  Urinalysis Basic - ( 06 Mar 2024 05:00 )    Color: Dark Yellow / Appearance: Cloudy / S.017 / pH: x  Gluc: x / Ketone: Trace mg/dL  / Bili: Small / Urobili: 1.0 mg/dL   Blood: x / Protein: 30 mg/dL / Nitrite: Negative   Leuk Esterase: Moderate / RBC: 3 /HPF / WBC 32 /HPF   Sq Epi: x / Non Sq Epi: x / Bacteria: Few /HPF                RADIOLOGY & ADDITIONAL TESTS: Personally reviewed.     Consultant(s) Notes Reviewed:  [x] YES  [ ] NO   Discussed with SW/LISS, RN

## 2024-03-06 NOTE — PROGRESS NOTE ADULT - ASSESSMENT
NATALIA: Prerenal azotemia, Septic ATN, NSAID nephropathy  Shock, Sepsis  Metabolic acidosis  Recent axillary abscess, s/p Drainage    Continue IV hydration. Add bicarb. Pressor support as needed. IV abx, ID follow up. No improvement in renal indices. Monitor vanco levels.   Avoid nephrotoxic meds as possible. Avoid ACEI, ARB, NSAIDs and IV contrast. Will follow electrolytes and renal function trend. ICU management.

## 2024-03-06 NOTE — PROGRESS NOTE ADULT - ASSESSMENT
22 yo male with hx of skin infections but no other significant pmh, presents for fever, chills, nausea for x 3 days. Found to have sepsis of unclear etiology, as well as NATALIA. Has an axillary abscess but otherwise no other obvious localizing signs/symptoms of infection. Per Surgery low suspicion for appendicitis. HIV negative. TTE showed no vegetation.    WBC increased to 18 today but bandemia resolved. No fever and now off pressors. Reports feeling a little better.    #Severe Sepsis  #Bandemia  #Leukocytosis  #Diarrhea  #NATALIA    -continue vancomycin by level (hold dose today), cefepime (renally dosed)  -continue doxycyline 100mg IV q12h  -follow blood and urine cultures  -follow up francisella tularensis serology  -if with loose stools send for GI PCR  -monitor WBC  -discussed with mother at bedside  -discussed with Dr. Samantha Pitts MD  Division of Infectious Diseases   Cell [phone] between 8am and 6pm   After 6pm and weekends please call ID service at [phone].     35 minutes spent on total encounter assessing patient, examination, chart review, counseling and coordinating care by the attending physician/nurse/care manager.

## 2024-03-06 NOTE — PROGRESS NOTE ADULT - SUBJECTIVE AND OBJECTIVE BOX
Patient is a 23y old  Male who presents with a chief complaint of Sepsis and NATALIA (06 Mar 2024 10:52)    24 hour events: Patient had episodes of hypotension overnight dropping to 73/46 at 4am. Patient was seen and examined bedside this morning. Patient denies any recent travel or hikes. Patient denies currently being sexually active. Patient stated he has received all his childhood vaccines. Patient stated he has been having recurrent L axillary lymphadenopathy being treated prior with Doxycycline with the most recent episode in 2023.  Patient still admits ot having diarrhea but denies symptoms of n/v. Patient stated he is unable to tolerate the Levophed due to symptoms of pain and numbness/tingling when drip is initiated.     REVIEW OF SYSTEMS  Constitutional: No fever, chills, fatigue  Neuro: No headache, numbness, weakness  Resp: No cough, wheezing, shortness of breath  CVS: No chest pain, palpitations, leg swelling  GI: No abdominal pain, nausea or vomiting; + diarrhea   : No dysuria, frequency, incontinence  Skin: No itching, burning, rashes, or lesions   Msk: No joint pain or swelling  Psych: No depression, anxiety, mood swings  Heme: No bleeding    T(F): 98.5 (24 @ 12:20), Max: 99.7 (24 @ 22:46)  HR: 133 (24 @ 12:00) (128 - 159)  BP: 108/52 (24 @ 12:00) (70/40 - 127/58)  RR: 25 (24 @ 12:00) (18 - 61)  SpO2: 94% (24 @ 12:00) (93% - 99%)  Wt(kg): --            I&O's Summary     @ 07:01  -   @ 07:00  --------------------------------------------------------  IN: 1078.2 mL / OUT: 400 mL / NET: 678.2 mL     @ 07:01  -   @ 12:47  --------------------------------------------------------  IN: 300 mL / OUT: 0 mL / NET: 300 mL    PHYSICAL EXAM:  General: NAD, somnolent laying down in bed on RA  HEENT: NC/AT  Pulm: labored, -W/R/R  Cardio: tachycardic, normal S1/S2, -M/R/G  Abdomen: soft, nontender, nondistended  Skin: Diffuse maculopapular rash present on extremities including hand and feet. Left axilla: purulent drainage of abscess     MEDICATIONS  cefepime   IVPB   cefepime   IVPB IV Intermittent  doxycycline IVPB IV Intermittent  midodrine Oral  phenylephrine    Infusion IV Continuous  acetaminophen     Tablet .. Oral PRN  acetaminophen     Tablet .. Oral PRN  melatonin Oral PRN  ondansetron Injectable IV Push PRN  traMADol Oral PRN  heparin   Injectable SubCutaneous  dextrose 5% + sodium chloride 0.45% IV Continuous  lidocaine   4% Patch Transdermal                            12.9   18.07 )-----------( 145      ( 06 Mar 2024 04:30 )             36.4       03-06    133<L>  |  103  |  41<H>  ----------------------------<  89  4.6   |  19<L>  |  4.80<H>    Ca    7.4<L>      06 Mar 2024 04:30  Phos  2.3     03-06  Mg     1.0     03-06    TPro  5.5<L>  /  Alb  2.0<L>  /  TBili  3.1<H>  /  DBili  x   /  AST  37  /  ALT  55  /  AlkPhos  68  03-06    Lactate 4.2           03-06 @ 04:30    Lactate 4.5           03-06 @ 00:46    Lactate 5.8           03-05 @ 20:14      CARDIAC MARKERS ( 05 Mar 2024 12:42 )  x     / x     / 235 U/L / x     / x          PT/INR - ( 06 Mar 2024 09:38 )   PT: 16.1 sec;   INR: 1.39 ratio         PTT - ( 06 Mar 2024 09:38 )  PTT:43.0 sec  Urinalysis Basic - ( 06 Mar 2024 05:00 )    Color: Dark Yellow / Appearance: Cloudy / S.017 / pH: x  Gluc: x / Ketone: Trace mg/dL  / Bili: Small / Urobili: 1.0 mg/dL   Blood: x / Protein: 30 mg/dL / Nitrite: Negative   Leuk Esterase: Moderate / RBC: 3 /HPF / WBC 32 /HPF   Sq Epi: x / Non Sq Epi: x / Bacteria: Few /HPF            Radiology:   from: CT Abdomen and Pelvis No Cont (24 @ 15:51) >  BOWEL: No bowel obstruction. Appendix slightly thickened measuring 8 mm   in caliber without adjacent stranding. Appendicolith in the proximal   appendix. Apparent focal distal esophageal mural thickening.  PERITONEUM: No free air or ascites.  VESSELS: Within normal limits.  RETROPERITONEUM/LYMPH NODES: No lymphadenopathy.  ABDOMINAL WALL: Small fat-containing umbilical hernia. Small  fat-containing bilateral inguinal hernias.  BONES: Within normal limits.    IMPRESSION:  No evidence for pulmonary consolidation or infiltrate.    Mildly enlarged left axillary lymph node    Appendix slightly thickened measuring 8 mm in caliber without adjacent   stranding. Appendicolith in the proximal appendix. Findings are equivocal   for acute appendicitis. Clinical correlation and continued clinical   observation are recommended.    Apparent focal distal esophageal mural thickening. EGD may be pursued for   further evaluation.    < end of copied text >    CENTRAL LINE: N      ALVARADO: N                          A-LINE: N                           GLOBAL ISSUE/BEST PRACTICE  Analgesia: Y  Sedation: N  CAM-ICU: N  HOB elevation: yes  Stress ulcer prophylaxis: N  VTE prophylaxis: Y  Glycemic control: Y  Nutrition: Y    CODE STATUS: Full Code  GOC discussion: Y       Patient is a 23y old  Male who presents with a chief complaint of Sepsis and NATALIA (06 Mar 2024 10:52)    24 hour events: Patient had episodes of hypotension overnight dropping to 73/46 at 4am. Patient was seen and examined bedside this morning. Patient denies any recent travel or hikes. Patient denies currently being sexually active. Patient stated he has received all his childhood vaccines. Patient stated he has been having recurrent L axillary lymphadenopathy being treated prior with Doxycycline with the most recent episode in 2023.  Patient still admits ot having diarrhea but denies symptoms of n/v. Patient stated he is unable to tolerate the Levophed due to symptoms of pain and numbness/tingling when drip is initiated.     REVIEW OF SYSTEMS  Constitutional: No fever, chills, fatigue  Neuro: No headache, numbness, weakness  Resp: No cough, wheezing, shortness of breath  CVS: No chest pain, palpitations, leg swelling  GI: No abdominal pain, nausea or vomiting; + diarrhea   : No dysuria, frequency, incontinence  Skin: No itching, burning, rashes, or lesions   Msk: No joint pain or swelling  Psych: No depression, anxiety, mood swings  Heme: No bleeding    T(F): 98.5 (24 @ 12:20), Max: 99.7 (24 @ 22:46)  HR: 133 (24 @ 12:00) (128 - 159)  BP: 108/52 (24 @ 12:00) (70/40 - 127/58)  RR: 25 (24 @ 12:00) (18 - 61)  SpO2: 94% (24 @ 12:00) (93% - 99%)  Wt(kg): --            I&O's Summary     @ 07:01  -   @ 07:00  --------------------------------------------------------  IN: 1078.2 mL / OUT: 400 mL / NET: 678.2 mL     @ 07:01  -   @ 12:47  --------------------------------------------------------  IN: 300 mL / OUT: 0 mL / NET: 300 mL    PHYSICAL EXAM:  General: NAD, laying down in bed on RA  HEENT: NC/AT  Pulm: labored, -W/R/R  Cardio: tachycardic, normal S1/S2, -M/R/G  Abdomen: soft, nontender, nondistended  Skin: Diffuse maculopapular rash present on extremities including hand and feet. Left axilla LAD    MEDICATIONS  cefepime   IVPB   cefepime   IVPB IV Intermittent  doxycycline IVPB IV Intermittent  midodrine Oral  phenylephrine    Infusion IV Continuous  acetaminophen     Tablet .. Oral PRN  acetaminophen     Tablet .. Oral PRN  melatonin Oral PRN  ondansetron Injectable IV Push PRN  traMADol Oral PRN  heparin   Injectable SubCutaneous  dextrose 5% + sodium chloride 0.45% IV Continuous  lidocaine   4% Patch Transdermal                            12.9   18.07 )-----------( 145      ( 06 Mar 2024 04:30 )             36.4       03-06    133<L>  |  103  |  41<H>  ----------------------------<  89  4.6   |  19<L>  |  4.80<H>    Ca    7.4<L>      06 Mar 2024 04:30  Phos  2.3     03-06  Mg     1.0     03-06    TPro  5.5<L>  /  Alb  2.0<L>  /  TBili  3.1<H>  /  DBili  x   /  AST  37  /  ALT  55  /  AlkPhos  68  03-06    Lactate 4.2           03-06 @ 04:30    Lactate 4.5           03-06 @ 00:46    Lactate 5.8           03-05 @ 20:14      CARDIAC MARKERS ( 05 Mar 2024 12:42 )  x     / x     / 235 U/L / x     / x          PT/INR - ( 06 Mar 2024 09:38 )   PT: 16.1 sec;   INR: 1.39 ratio         PTT - ( 06 Mar 2024 09:38 )  PTT:43.0 sec  Urinalysis Basic - ( 06 Mar 2024 05:00 )    Color: Dark Yellow / Appearance: Cloudy / S.017 / pH: x  Gluc: x / Ketone: Trace mg/dL  / Bili: Small / Urobili: 1.0 mg/dL   Blood: x / Protein: 30 mg/dL / Nitrite: Negative   Leuk Esterase: Moderate / RBC: 3 /HPF / WBC 32 /HPF   Sq Epi: x / Non Sq Epi: x / Bacteria: Few /HPF            Radiology:   from: CT Abdomen and Pelvis No Cont (24 @ 15:51) >  BOWEL: No bowel obstruction. Appendix slightly thickened measuring 8 mm   in caliber without adjacent stranding. Appendicolith in the proximal   appendix. Apparent focal distal esophageal mural thickening.  PERITONEUM: No free air or ascites.  VESSELS: Within normal limits.  RETROPERITONEUM/LYMPH NODES: No lymphadenopathy.  ABDOMINAL WALL: Small fat-containing umbilical hernia. Small  fat-containing bilateral inguinal hernias.  BONES: Within normal limits.    IMPRESSION:  No evidence for pulmonary consolidation or infiltrate.    Mildly enlarged left axillary lymph node    Appendix slightly thickened measuring 8 mm in caliber without adjacent   stranding. Appendicolith in the proximal appendix. Findings are equivocal   for acute appendicitis. Clinical correlation and continued clinical   observation are recommended.    Apparent focal distal esophageal mural thickening. EGD may be pursued for   further evaluation.    < end of copied text >    CENTRAL LINE: N      ALVARADO: N                          A-LINE: N                           GLOBAL ISSUE/BEST PRACTICE  Analgesia: Y  Sedation: N  CAM-ICU: N  HOB elevation: yes  Stress ulcer prophylaxis: N  VTE prophylaxis: Y  Glycemic control: Y  Nutrition: Y    CODE STATUS: Full Code  GOC discussion: Y       Patient is a 23y old  Male who presents with a chief complaint of Sepsis and NATALIA (06 Mar 2024 10:52)    24 hour events: Patient had episodes of hypotension overnight dropping to 73/46 at 4am despite 1L NS bolus x3. Taken to ICU and started on low dose levophed. Patient was seen and examined bedside this morning. Regarding history, patient denies any recent travel or hikes. Patient denies currently being sexually active. Patient stated he has received all his childhood vaccines. Patient stated he has been having recurrent L axillary lymphadenopathy being treated prior with Doxycycline with the most recent episode in 2023. Patient still admits to having diarrhea but denies symptoms of n/v. Patient stated he is unable to tolerate the Levophed due to symptoms of pain and numbness/tingling when drip is initiated.     REVIEW OF SYSTEMS  Constitutional: No fever, chills, fatigue  Neuro: No headache, numbness, weakness  Resp: No cough, wheezing, shortness of breath  CVS: No chest pain, palpitations, leg swelling  GI: No abdominal pain, nausea or vomiting; + diarrhea   : No dysuria, frequency, incontinence  Skin: +rash on entire body, +improving itching, +R arm burning at IV site, +non-fluctuant L armpit lymphadenopathy  Msk: No joint pain or swelling  Psych: No depression, anxiety, mood swings  Heme: No bleeding    T(F): 98.5 (24 @ 12:20), Max: 99.7 (24 @ 22:46)  HR: 133 (24 @ 12:00) (128 - 159)  BP: 108/52 (24 @ 12:00) (70/40 - 127/58)  RR: 25 (24 @ 12:00) (18 - 61)  SpO2: 94% (24 @ 12:00) (93% - 99%)  Wt(kg): --            I&O's Summary     @ 07:01  -   @ 07:00  --------------------------------------------------------  IN: 1078.2 mL / OUT: 400 mL / NET: 678.2 mL    03 @ 07:01  -  03 @ 12:47  --------------------------------------------------------  IN: 300 mL / OUT: 0 mL / NET: 300 mL    PHYSICAL EXAM:  General: NAD, somnolent laying down in bed on RA  HEENT: NC/AT  Pulm: labored, -W/R/R  Cardio: tachycardic, normal S1/S2, -M/R/G  Abdomen: soft, nontender, nondistended  Skin: Diffuse maculopapular rash present on extremities including hand and feet. Left axilla: non-draining abscess     MEDICATIONS  cefepime   IVPB   cefepime   IVPB IV Intermittent  doxycycline IVPB IV Intermittent  midodrine Oral  phenylephrine    Infusion IV Continuous  acetaminophen     Tablet .. Oral PRN  acetaminophen     Tablet .. Oral PRN  melatonin Oral PRN  ondansetron Injectable IV Push PRN  traMADol Oral PRN  heparin   Injectable SubCutaneous  dextrose 5% + sodium chloride 0.45% IV Continuous  lidocaine   4% Patch Transdermal                            12.9   18.07 )-----------( 145      ( 06 Mar 2024 04:30 )             36.4       03-06    133<L>  |  103  |  41<H>  ----------------------------<  89  4.6   |  19<L>  |  4.80<H>    Ca    7.4<L>      06 Mar 2024 04:30  Phos  2.3     03-06  Mg     1.0     03-06    TPro  5.5<L>  /  Alb  2.0<L>  /  TBili  3.1<H>  /  DBili  x   /  AST  37  /  ALT  55  /  AlkPhos  68  03-06    Lactate 4.2           03-06 @ 04:30    Lactate 4.5           03-06 @ 00:46    Lactate 5.8           03-05 @ 20:14      CARDIAC MARKERS ( 05 Mar 2024 12:42 )  x     / x     / 235 U/L / x     / x          PT/INR - ( 06 Mar 2024 09:38 )   PT: 16.1 sec;   INR: 1.39 ratio         PTT - ( 06 Mar 2024 09:38 )  PTT:43.0 sec  Urinalysis Basic - ( 06 Mar 2024 05:00 )    Color: Dark Yellow / Appearance: Cloudy / S.017 / pH: x  Gluc: x / Ketone: Trace mg/dL  / Bili: Small / Urobili: 1.0 mg/dL   Blood: x / Protein: 30 mg/dL / Nitrite: Negative   Leuk Esterase: Moderate / RBC: 3 /HPF / WBC 32 /HPF   Sq Epi: x / Non Sq Epi: x / Bacteria: Few /HPF            Radiology:   from: CT Abdomen and Pelvis No Cont (24 @ 15:51) >  BOWEL: No bowel obstruction. Appendix slightly thickened measuring 8 mm   in caliber without adjacent stranding. Appendicolith in the proximal   appendix. Apparent focal distal esophageal mural thickening.  PERITONEUM: No free air or ascites.  VESSELS: Within normal limits.  RETROPERITONEUM/LYMPH NODES: No lymphadenopathy.  ABDOMINAL WALL: Small fat-containing umbilical hernia. Small  fat-containing bilateral inguinal hernias.  BONES: Within normal limits.    IMPRESSION:  No evidence for pulmonary consolidation or infiltrate.    Mildly enlarged left axillary lymph node    Appendix slightly thickened measuring 8 mm in caliber without adjacent   stranding. Appendicolith in the proximal appendix. Findings are equivocal   for acute appendicitis. Clinical correlation and continued clinical   observation are recommended.    Apparent focal distal esophageal mural thickening. EGD may be pursued for   further evaluation.    < end of copied text >    CENTRAL LINE: N      ALVARADO: N                          A-LINE: N                           GLOBAL ISSUE/BEST PRACTICE  Analgesia: Y  Sedation: N  HOB elevation: yes  Stress ulcer prophylaxis: N  VTE prophylaxis: Y  Glycemic control: N  Nutrition: Y    CODE STATUS: Full Code  GOC discussion: Y

## 2024-03-06 NOTE — CONSULT NOTE ADULT - SUBJECTIVE AND OBJECTIVE BOX
[facility]  Stefan Rayo PA-C  [address] [place], [place] [id number] [phone]      Chief Complaint:  Patient is a 23y old  Male who presents with a chief complaint of Sepsis and NATALIA (06 Mar 2024 14:25)      HPI:  24 yo male with no significant pmh presents for fever, chills, nausea for >3 days.    Patient states that since Saturday, he has been having significant rigors, fevers, and has been feeling unwell. He had a left axiliary node drainage a few days ago by urgent care, after noticing that he had a skin infection with purulent drainage located near his left axilla. He was started on doxycyline outpatient for the skin infection, but has had persistent fevers. His last temperature was 102.6F and he alternated between Tylenol and ibuprofen but was not able to get his temperature down. He has a history of multiple skin infections and has tolerated doxycyline in the past. Patient also endorses a generalized skin rash since Saturday, nausea with 2 episodes of NBNB emesis, and loose stool. He also endorses generalized abdominal pain.    Of note, patient also complains of left shoulder pain and rates it 7/10. He states he has history of prior dislocations and movement of the shoulder increases pain.     Patient denies  HA,  CP, SOB, Dysuria, Leg pain, or Leg swelling.    Patient denies any recent travel, sick contacts, or long periods of immobilization.    INTERVAL HPI:  Pt s/e in ICU with mother at bedside  Pt lethargic, obtained hx from mother  Discussed same hx as above  Pt had nausea/vomiting/diarrhea and abdominal pain  Otherwise no new GI events    Allergies:  No Known Allergies      Medications:  acetaminophen     Tablet .. 650 milliGRAM(s) Oral every 6 hours PRN  acetaminophen     Tablet .. 650 milliGRAM(s) Oral every 6 hours PRN  cefepime   IVPB      cefepime   IVPB 1000 milliGRAM(s) IV Intermittent every 12 hours  dextrose 5% + sodium chloride 0.45% 1000 milliLiter(s) IV Continuous <Continuous>  doxycycline IVPB 100 milliGRAM(s) IV Intermittent every 12 hours  heparin   Injectable 5000 Unit(s) SubCutaneous every 8 hours  lidocaine   4% Patch 2 Patch Transdermal every 24 hours  magnesium sulfate  IVPB 2 Gram(s) IV Intermittent once  melatonin 3 milliGRAM(s) Oral at bedtime PRN  midodrine 10 milliGRAM(s) Oral every 8 hours  ondansetron Injectable 4 milliGRAM(s) IV Push every 4 hours PRN  phenylephrine    Infusion 0.1 MICROgram(s)/kG/Min IV Continuous <Continuous>  traMADol 25 milliGRAM(s) Oral every 6 hours PRN      PMHX/PSHX:  No pertinent past medical history    No significant past surgical history        Family history:  FH: type 2 diabetes (Father)    FH: hyperlipidemia (Father, Mother)          ROS:     General:  No fevers, chills, night sweats, fatigue  Eyes:  Good vision, no reported pain  ENT:  No sore throat, pain, runny nose, dysphagia  CV:  No pain, palpitations, hypo/hypertension  Resp:  No dyspnea, cough, tachypnea, wheezing  GI:  +pain, +nausea, +vomiting, +diarrhea, No constipation, No weight loss, No fever, No pruritis, No rectal bleeding, No tarry stools, No dysphagia  :  No pain, bleeding, incontinence, nocturia  Muscle:  No pain, weakness  Neuro:  No weakness, tingling, memory problems  Psych:  No fatigue, insomnia, mood problems, depression  Endocrine:  No polyuria, polydipsia, cold/heat intolerance  Heme:  No petechiae, ecchymosis, easy bruisability  Skin:  No rash, tattoos, scars, edema      PHYSICAL EXAM:   Vital Signs:  Vital Signs Last 24 Hrs  T(C): 36.9 (06 Mar 2024 12:20), Max: 37.6 (05 Mar 2024 22:46)  T(F): 98.5 (06 Mar 2024 12:20), Max: 99.7 (05 Mar 2024 22:46)  HR: 121 (06 Mar 2024 14:30) (121 - 159)  BP: 100/48 (06 Mar 2024 14:30) (70/40 - 127/58)  BP(mean): 69 (06 Mar 2024 14:30) (49 - 88)  RR: 19 (06 Mar 2024 14:30) (18 - 61)  SpO2: 96% (06 Mar 2024 14:30) (93% - 99%)    Parameters below as of 06 Mar 2024 01:10  Patient On (Oxygen Delivery Method): room air      Daily Height in cm: 172.72 (06 Mar 2024 01:10)    Daily Weight in k (06 Mar 2024 01:10)    GENERAL:  Appears stated age  HEENT:  NC/AT  CHEST:  Full & symmetric excursion  HEART:  Regular rhythm  ABDOMEN:  Soft, non-tender, non-distended  EXTEREMITIES:  no cyanosis, clubbing or edema  SKIN:  No rash  NEURO:  Lethargic    LABS:                        12.9   18.07 )-----------( 145      ( 06 Mar 2024 04:30 )             36.4     03-06    133<L>  |  103  |  41<H>  ----------------------------<  89  4.6   |  19<L>  |  4.80<H>    Ca    7.4<L>      06 Mar 2024 04:30  Phos  2.3     03-06  Mg     1.0     03-06    TPro  5.5<L>  /  Alb  2.0<L>  /  TBili  3.1<H>  /  DBili  x   /  AST  37  /  ALT  55  /  AlkPhos  68  03-06    LIVER FUNCTIONS - ( 06 Mar 2024 04:30 )  Alb: 2.0 g/dL / Pro: 5.5 g/dL / ALK PHOS: 68 U/L / ALT: 55 U/L / AST: 37 U/L / GGT: x           PT/INR - ( 06 Mar 2024 09:38 )   PT: 16.1 sec;   INR: 1.39 ratio         PTT - ( 06 Mar 2024 09:38 )  PTT:43.0 sec  Urinalysis Basic - ( 06 Mar 2024 05:00 )    Color: Dark Yellow / Appearance: Cloudy / S.017 / pH: x  Gluc: x / Ketone: Trace mg/dL  / Bili: Small / Urobili: 1.0 mg/dL   Blood: x / Protein: 30 mg/dL / Nitrite: Negative   Leuk Esterase: Moderate / RBC: 3 /HPF / WBC 32 /HPF   Sq Epi: x / Non Sq Epi: x / Bacteria: Few /HPF      Imaging:    < from: CT Abdomen and Pelvis No Cont (24 @ 15:51) >    ACC: [id number] EXAM:  CT CHEST   ORDERED BY: ANNALISE BECKER     ACC: [id number] EXAM:  CT ABDOMEN AND PELVIS   ORDERED BY: ANNALISE BECKER     PROCEDURE DATE:  2024          INTERPRETATION:  CLINICAL INFORMATION: Fevers. Myalgias.    COMPARISON: None.    CONTRAST/COMPLICATIONS:  IV Contrast: None  Oral Contrast: None  Complications: None reported at time of study completion    PROCEDURE:  CT of the Chest, Abdomen and Pelvis was performed.  Sagittal and coronal reformats were performed.    FINDINGS:  CHEST:  LUNGS AND LARGE AIRWAYS: Patent central airways. Small bilateral   atelectasis. Mild biapical pleural parenchymal scarring. No evidence for   pulmonary consolidation or infiltrate.  PLEURA: No pleural effusion or pneumothorax.  VESSELS: Within normal limits.  HEART: Heart size is normal. No pericardial effusion.  MEDIASTINUM AND JULIA: Mildly enlarged left axillary lymph node measuring   1.3 cm in short axis. No evidence for enlarged mediastinal or hilar lymph   nodes.  CHEST WALL AND LOWER NECK: Within normal limits.    ABDOMEN AND PELVIS: Injection of the abdomen and pelvis is limited by   lack of IV contrast.  LIVER: Hepatic steatosis.  BILE DUCTS: Normal caliber.  GALLBLADDER: Within normal limits.  SPLEEN: Within normal limits.  PANCREAS: Within normal limits.  ADRENALS: The right adrenal appears unremarkable. Nonspecific mild left   adrenal thickening.  KIDNEYS/URETERS: Within normal limits. No evidence of a calculus in the   kidneys or ureters. No hydronephrosis.    BLADDER: Within normal limits.  REPRODUCTIVE ORGANS: The prostate and seminal vesicles appear grossly   unremarkable.    BOWEL: No bowel obstruction. Appendix slightly thickened measuring 8 mm   in caliber without adjacent stranding. Appendicolith in the proximal   appendix. Apparent focal distal esophageal mural thickening.  PERITONEUM: No free air or ascites.  VESSELS: Within normal limits.  RETROPERITONEUM/LYMPH NODES: No lymphadenopathy.  ABDOMINAL WALL: Small fat-containing umbilical hernia. Small  fat-containing bilateral inguinal hernias.  BONES: Within normal limits.    IMPRESSION:  No evidence for pulmonary consolidation or infiltrate.    Mildly enlarged left axillary lymph node    Appendix slightly thickened measuring 8 mm in caliber without adjacent   stranding. Appendicolith in the proximal appendix. Findings are equivocal   for acute appendicitis. Clinical correlation and continued clinical   observation are recommended.    Apparent focal distal esophageal mural thickening. EGD may be pursued for   further evaluation.    --- End of Report ---      NADIA GARNICA MD; Attending Radiologist  This document has been electronically signed. Mar  5 2024  4:37PM    < end of copied text >

## 2024-03-06 NOTE — PROGRESS NOTE ADULT - SUBJECTIVE AND OBJECTIVE BOX
Patient is a 24 yo male admitted with sepsis and NATALIA without designated source. UA + for few bacteria, CT demonstrates 8 mm Appendix, slightly thickened without adjacent stranding. Appendicolith in the proximal appendix. Patient is tachycardic, hypotensive, + lactic acidosis, + leukocytosis. He is laying uncomfortably in his bed, reports some improvement as compared to yesterday. His discomfort and pain are mostly in his hands, bilaterally. He feels some improvement with hanging them over the side of the bed. He has some discomfort in the left axilla. He denies abdominal pain.     He denies chest pain, N/V. He reports some shortness of breath, overall feeling ill.     T(C): 37 (24 @ 08:18), Max: 37.6 (24 @ 22:46)  HR: 135 (24 @ 09:00) (108 - 159)  BP: 91/44 (24 @ 09:00) (70/40 - 127/58)  RR: 23 (24 @ 09:00) (16 - 61)  SpO2: 94% (24 @ 09:00) (94% - 99%)    PHYSICAL EXAM:  General: no acute distress, appears uncomfortable  HEENT: normocephalic, anicteric  Pulm: labored, rapid respirations  Cardio: tachycardic   Abdomen: soft, nontender, nondistended  Skin: Diffuse erythematous rash, calor. Bilateral hands with mild edema, erythema.   Left axilla: Non- palpable mass, seropurulent drainage present and mild tenderness with expression.    I&O's Detail    05 Mar 2024 07:01  -  06 Mar 2024 07:00  --------------------------------------------------------  IN:    IV PiggyBack: 50 mL    IV PiggyBack: 100 mL    IV PiggyBack: 50 mL    Lactated Ringers: 375 mL    Norepinephrine: 3.2 mL    sodium chloride 0.9%: 500 mL  Total IN: 1078.2 mL    OUT:    IV PiggyBack: 0 mL    Voided (mL): 400 mL  Total OUT: 400 mL    Total NET: 678.2 mL          LABS:                        12.9   18.07 )-----------( 145      ( 06 Mar 2024 04:30 )             36.4     03-06    133<L>  |  103  |  41<H>  ----------------------------<  89  4.6   |  19<L>  |  4.80<H>    Ca    7.4<L>      06 Mar 2024 04:30  Phos  2.3     03-06  Mg     1.0     03-06    TPro  5.5<L>  /  Alb  2.0<L>  /  TBili  3.1<H>  /  DBili  x   /  AST  37  /  ALT  55  /  AlkPhos  68  -      Urinalysis Basic - ( 06 Mar 2024 05:00 )    Color: Dark Yellow / Appearance: Cloudy / S.017 / pH: x  Gluc: x / Ketone: Trace mg/dL  / Bili: Small / Urobili: 1.0 mg/dL   Blood: x / Protein: 30 mg/dL / Nitrite: Negative   Leuk Esterase: Moderate / RBC: 3 /HPF / WBC 32 /HPF   Sq Epi: x / Non Sq Epi: x / Bacteria: Few /HPF      MEDICATIONS:  acetaminophen     Tablet .. 650 milliGRAM(s) Oral every 6 hours PRN  acetaminophen     Tablet .. 650 milliGRAM(s) Oral every 6 hours PRN  cefepime   IVPB      cefepime   IVPB 1000 milliGRAM(s) IV Intermittent every 12 hours  doxycycline IVPB 100 milliGRAM(s) IV Intermittent every 12 hours  lactated ringers. 1000 milliLiter(s) IV Continuous <Continuous>  lidocaine   4% Patch 2 Patch Transdermal every 24 hours  melatonin 3 milliGRAM(s) Oral at bedtime PRN  midodrine 10 milliGRAM(s) Oral every 8 hours  ondansetron Injectable 4 milliGRAM(s) IV Push every 4 hours PRN  phenylephrine    Infusion 0.1 MICROgram(s)/kG/Min IV Continuous <Continuous>  saccharomyces boulardii 250 milliGRAM(s) Oral two times a day  traMADol 25 milliGRAM(s) Oral every 6 hours PRN              22 y/o M admitted for sepsis and NATALIA    -Cont ICU management  -Blood cultures pending   - CT: IMPRESSION:  No evidence for pulmonary consolidation or infiltrate.    Mildly enlarged left axillary lymph node    Appendix slightly thickened measuring 8 mm in caliber without adjacent   stranding. Appendicolith in the proximal appendix. Findings are equivocal   for acute appendicitis. Clinical correlation and continued clinical   observation are recommended.    Apparent focal distal esophageal mural thickening. EGD may be pursued for   further evaluation.  - UA: Negative  - Cont reg diet  - Continue antibiotics  - Pain control as needed  - Encourage OOB/ambulation & incentive spirometry  - Monitor bowel function/serial abdominal exams  - Trend daily labs  - DVT Prophylaxis with SCDs  - Above to be discussed with Dr. Mckinley    Surgical Team  Spectralink: [phone]

## 2024-03-06 NOTE — PROGRESS NOTE ADULT - SUBJECTIVE AND OBJECTIVE BOX
[facility]  INFECTIOUS DISEASES - [address], [place] [address]  Tel: [phone]     Fax: [phone]  =======================================================    GARY DONALDSON [id number]    Follow up: No fevers. Off pressors. Feels a little better. Has some generalized body aches but no actual pain. Denies any SOB.    Allergies:  No Known Allergies      Antibiotics:  acetaminophen     Tablet .. 650 milliGRAM(s) Oral every 6 hours PRN  acetaminophen     Tablet .. 650 milliGRAM(s) Oral every 6 hours PRN  cefepime   IVPB      cefepime   IVPB 1000 milliGRAM(s) IV Intermittent every 12 hours  dextrose 5% + sodium chloride 0.45% 1000 milliLiter(s) IV Continuous <Continuous>  doxycycline IVPB 100 milliGRAM(s) IV Intermittent every 12 hours  heparin   Injectable 5000 Unit(s) SubCutaneous every 8 hours  lidocaine   4% Patch 2 Patch Transdermal every 24 hours  melatonin 3 milliGRAM(s) Oral at bedtime PRN  midodrine 10 milliGRAM(s) Oral every 8 hours  ondansetron Injectable 4 milliGRAM(s) IV Push every 4 hours PRN  phenylephrine    Infusion 0.1 MICROgram(s)/kG/Min IV Continuous <Continuous>  traMADol 25 milliGRAM(s) Oral every 6 hours PRN       REVIEW OF SYSTEMS:  CONSTITUTIONAL:  No Fevers  HEENT:  No sore throat or runny nose.  CARDIOVASCULAR:  No chest pain or SOB.  RESPIRATORY:  No cough, shortness of breath  GASTROINTESTINAL: No nausea, vomiting or diarrhea  GENITOURINARY:  No dysuria, frequency or urgency  MUSCULOSKELETAL:  no joint swelling  NEUROLOGIC:  (+) mild headache  PSYCHIATRIC:  No disorder of thought or mood.       Physical Exam:  ICU Vital Signs Last 24 Hrs  T(C): 36.9 (06 Mar 2024 12:20), Max: 37.6 (05 Mar 2024 22:46)  T(F): 98.5 (06 Mar 2024 12:20), Max: 99.7 (05 Mar 2024 22:46)  HR: 133 (06 Mar 2024 12:00) (128 - 159)  BP: 108/52 (06 Mar 2024 12:00) (70/40 - 127/58)  BP(mean): 75 (06 Mar 2024 12:00) (49 - 88)  ABP: --  ABP(mean): --  RR: 25 (06 Mar 2024 12:00) (18 - 61)  SpO2: 94% (06 Mar 2024 12:00) (93% - 99%)    O2 Parameters below as of 06 Mar 2024 01:10  Patient On (Oxygen Delivery Method): room air        GEN: not in acute distress  HEENT: normocephalic and atraumatic.   NECK: Supple.   LUNGS: Normal respiratory effort  HEART: Tachycardic  ABDOMEN: Soft, nontender, and nondistended.    EXTREMITIES: No leg edema.  NEUROLOGIC: AO x 3, no photophobia, no nuchal rigidity  PSYCHIATRIC: Appropriate affect .  SKIN: erythematous rash noted on chest/neck appears resolved; (+) induration on L axilla, no purulence      Labs:      133<L>  |  103  |  41<H>  ----------------------------<  89  4.6   |  19<L>  |  4.80<H>    Ca    7.4<L>      06 Mar 2024 04:30  Phos  2.3     03-06  Mg     1.0     -06    TPro  5.5<L>  /  Alb  2.0<L>  /  TBili  3.1<H>  /  DBili  x   /  AST  37  /  ALT  55  /  AlkPhos  68  -06                          12.9   18.07 )-----------( 145      ( 06 Mar 2024 04:30 )             36.4     PT/INR - ( 06 Mar 2024 09:38 )   PT: 16.1 sec;   INR: 1.39 ratio         PTT - ( 06 Mar 2024 09:38 )  PTT:43.0 sec  Urinalysis Basic - ( 06 Mar 2024 05:00 )    Color: Dark Yellow / Appearance: Cloudy / S.017 / pH: x  Gluc: x / Ketone: Trace mg/dL  / Bili: Small / Urobili: 1.0 mg/dL   Blood: x / Protein: 30 mg/dL / Nitrite: Negative   Leuk Esterase: Moderate / RBC: 3 /HPF / WBC 32 /HPF   Sq Epi: x / Non Sq Epi: x / Bacteria: Few /HPF      LIVER FUNCTIONS - ( 06 Mar 2024 04:30 )  Alb: 2.0 g/dL / Pro: 5.5 g/dL / ALK PHOS: 68 U/L / ALT: 55 U/L / AST: 37 U/L / GGT: x             RECENT CULTURES:        All imaging and data are reviewed.     < from: US Axilla Only, Left (24 @ 08:19) >  There is an avascular 1.8 x 0.5 x 1.3 cm heterogeneous fluid collection   in the subcutaneous soft tissues.  This is approximately 3 mm in depth from the skin surface.    IMPRESSION:    Small heterogeneous subcutaneous fluid collection in the left axilla     < end of copied text >  < from: CT Chest No Cont (24 @ 15:51) >  FINDINGS:  CHEST:  LUNGS AND LARGE AIRWAYS: Patent central airways. Small bilateral   atelectasis. Mild biapical pleural parenchymal scarring. No evidence for   pulmonary consolidation or infiltrate.  PLEURA: No pleural effusion or pneumothorax.  VESSELS: Within normal limits.  HEART: Heart size is normal. No pericardial effusion.  MEDIASTINUM AND JULIA: Mildly enlarged left axillary lymph node measuring   1.3 cm in short axis. No evidence for enlarged mediastinal or hilar lymph   nodes.  CHEST WALL AND LOWER NECK: Within normal limits.    ABDOMEN AND PELVIS: Injection of the abdomen and pelvis is limited by   lack of IV contrast.  LIVER: Hepatic steatosis.  BILE DUCTS: Normal caliber.  GALLBLADDER: Within normal limits.  SPLEEN: Within normal limits.  PANCREAS: Within normal limits.  ADRENALS: The right adrenal appears unremarkable. Nonspecific mild left   adrenal thickening.  KIDNEYS/URETERS: Within normal limits. No evidence of a calculus in the   kidneys or ureters. No hydronephrosis.    BLADDER: Within normal limits.  REPRODUCTIVE ORGANS: The prostate and seminal vesicles appear grossly   unremarkable.    BOWEL: No bowel obstruction. Appendix slightly thickened measuring 8 mm   in caliber without adjacent stranding. Appendicolith in the proximal   appendix. Apparent focal distal esophageal mural thickening.  PERITONEUM: No free air or ascites.  VESSELS: Within normal limits.  RETROPERITONEUM/LYMPH NODES: No lymphadenopathy.  ABDOMINAL WALL: Small fat-containing umbilical hernia. Small  fat-containing bilateral inguinal hernias.  BONES: Within normal limits.    IMPRESSION:  No evidence for pulmonary consolidation or infiltrate.    Mildly enlarged left axillary lymph node    Appendix slightly thickened measuring 8 mm in caliber without adjacent   stranding. Appendicolith in the proximal appendix. Findings are equivocal   for acute appendicitis. Clinical correlation and continued clinical   observation are recommended.    Apparent focal distal esophageal mural thickening. EGD may be pursued for   further evaluation.    --- End of Report ---      < end of copied text >

## 2024-03-06 NOTE — PROGRESS NOTE ADULT - SUBJECTIVE AND OBJECTIVE BOX
Interval Events:  Admitted to ICU overnight for hypotension, sepsis, renal failure  BP improved. Vasopressors discontinued.  Denies SOB, CP  No fevers.  No abd pain, nausea, vomiting    REVIEW OF SYSTEMS:  [x] All other systems negative  [ ] Unable to assess ROS because ________    OBJECTIVE:  ICU Vital Signs Last 24 Hrs  T(C): 36.9 (06 Mar 2024 12:20), Max: 37.6 (05 Mar 2024 22:46)  T(F): 98.5 (06 Mar 2024 12:20), Max: 99.7 (05 Mar 2024 22:46)  HR: 133 (06 Mar 2024 12:00) (128 - 159)  BP: 108/52 (06 Mar 2024 12:00) (70/40 - 127/58)  BP(mean): 75 (06 Mar 2024 12:00) (49 - 88)  ABP: --  ABP(mean): --  RR: 25 (06 Mar 2024 12:00) (18 - 61)  SpO2: 94% (06 Mar 2024 12:00) (93% - 99%)    O2 Parameters below as of 06 Mar 2024 01:10  Patient On (Oxygen Delivery Method): room air               @ 07: @ 07:00  --------------------------------------------------------  IN: 1078.2 mL / OUT: 400 mL / NET: 678.2 mL     @ 07: @ 13:46  --------------------------------------------------------  IN: 300 mL / OUT: 0 mL / NET: 300 mL      CAPILLARY BLOOD GLUCOSE          PHYSICAL EXAM:  General: NAD  HEENT: NC/AT  Lymph Nodes: R axillary palpable LN. No palpable adenopathy in groin.  Neck: Supple  Respiratory: CTAB. No wheezing  Cardiovascular: RRR. No edema.  Abdomen: Rash anterior abdomen  Extremities: Warm  Skin: Intact  Neurological: A&Ox3  Psychiatry: Normal mood and affect    HOSPITAL MEDICATIONS:  heparin   Injectable 5000 Unit(s) SubCutaneous every 8 hours    cefepime   IVPB      cefepime   IVPB 1000 milliGRAM(s) IV Intermittent every 12 hours  doxycycline IVPB 100 milliGRAM(s) IV Intermittent every 12 hours    midodrine 10 milliGRAM(s) Oral every 8 hours  phenylephrine    Infusion 0.1 MICROgram(s)/kG/Min IV Continuous <Continuous>        acetaminophen     Tablet .. 650 milliGRAM(s) Oral every 6 hours PRN  acetaminophen     Tablet .. 650 milliGRAM(s) Oral every 6 hours PRN  melatonin 3 milliGRAM(s) Oral at bedtime PRN  ondansetron Injectable 4 milliGRAM(s) IV Push every 4 hours PRN  traMADol 25 milliGRAM(s) Oral every 6 hours PRN          dextrose 5% + sodium chloride 0.45% 1000 milliLiter(s) IV Continuous <Continuous>      lidocaine   4% Patch 2 Patch Transdermal every 24 hours        LABS:                        12.9   18.07 )-----------( 145      ( 06 Mar 2024 04:30 )             36.4     Hgb Trend: 12.9<--, 14.6<--  03-06    133<L>  |  103  |  41<H>  ----------------------------<  89  4.6   |  19<L>  |  4.80<H>    Ca    7.4<L>      06 Mar 2024 04:30  Phos  2.3     03-06  Mg     1.0     03-06    TPro  5.5<L>  /  Alb  2.0<L>  /  TBili  3.1<H>  /  DBili  x   /  AST  37  /  ALT  55  /  AlkPhos  68  03-06    Creatinine Trend: 4.80<--, 4.80<--  PT/INR - ( 06 Mar 2024 09:38 )   PT: 16.1 sec;   INR: 1.39 ratio         PTT - ( 06 Mar 2024 09:38 )  PTT:43.0 sec  Urinalysis Basic - ( 06 Mar 2024 05:00 )    Color: Dark Yellow / Appearance: Cloudy / S.017 / pH: x  Gluc: x / Ketone: Trace mg/dL  / Bili: Small / Urobili: 1.0 mg/dL   Blood: x / Protein: 30 mg/dL / Nitrite: Negative   Leuk Esterase: Moderate / RBC: 3 /HPF / WBC 32 /HPF   Sq Epi: x / Non Sq Epi: x / Bacteria: Few /HPF            MICROBIOLOGY:     RADIOLOGY:  [ ] Reviewed and interpreted by me    < from: CT Abdomen and Pelvis No Cont (24 @ 15:51) >  IMPRESSION:  No evidence for pulmonary consolidation or infiltrate.    Mildly enlarged left axillary lymph node    Appendix slightly thickened measuring 8 mm in caliber without adjacent   stranding. Appendicolith in the proximal appendix. Findings are equivocal   for acute appendicitis. Clinical correlation and continued clinical   observation are recommended.    Apparent focal distal esophageal mural thickening. EGD may be pursued for   further evaluation.    --- End of Report ---    < end of copied text >

## 2024-03-06 NOTE — DISCHARGE NOTE PROVIDER - NSDCMRMEDTOKEN_GEN_ALL_CORE_FT
cholecalciferol oral tablet: 1000 unit(s) orally once a day  doxycycline monohydrate 50 mg oral capsule: 2 cap(s) orally every 12 hours  levoFLOXacin 750 mg oral tablet: 1 tab(s) orally every 24 hours  Multiple Vitamins with Minerals oral tablet: 1 tab(s) orally once a day  Protonix 40 mg oral delayed release tablet: 1 tab(s) orally once a day

## 2024-03-06 NOTE — PHARMACOTHERAPY INTERVENTION NOTE - COMMENTS
Patient is a 22 yo M admitted for sepsis and NATALIA. Discussed with Dr. Singh starting therapy for DVT prophylaxis. Recommendation accepted and heparin 5,000 units q8h entered per discussion. 
Patient is a 22 yo male being treated for sepsis, currently ordered for IV cefepime 1 g Q12H + doxycycline 100 mg Q12H. Discussed with ICU PA and recommended increasing cefpime dose to 2 g Q12H based on patient's current renal function (CrCl- 33). Recommendation was accepted and order was entered per discussion. Next dose due 3/7 at 06:00.

## 2024-03-06 NOTE — DISCHARGE NOTE PROVIDER - CARE PROVIDER_API CALL
Chuckie Briceño Luisa  Medical Oncology  [facility], [address] [address]  Phone: [phone]  Fax: [phone]  Follow Up Time:

## 2024-03-06 NOTE — CONSULT NOTE ADULT - SUBJECTIVE AND OBJECTIVE BOX
23M no med hx no meds or surgeries  presents for fever, rigors , nausea a few episodes of loose stools and emesis since last Saturday. On Sunday, he developed a diffuse macular papular pruritic   rash     On Monday he went to urgent care and had a teaspoon of pus expressed from an abscess in his left axilla.      Here with fever/Septic shock/Lactic acidosis/NATALIA/40% bandemia.      Lactate 8 persistent hypotension that is thus far volume responsive.    POCUS LVEF appears somewhat reduced no pericardial effusion.  No large visible vegetations as far as I can tell.  IVC sub 2cm but not collapsed.  Lungs are A line predominant.      RX Cefepime/Vanco per level/Doxy in case of any zoonotic causes     Check 2D echo out of concern for possible SBE    US L axilla     Has received > 5 liters IVF     Pressor if BP drops again.    CCT 39

## 2024-03-06 NOTE — PROGRESS NOTE ADULT - SUBJECTIVE AND OBJECTIVE BOX
Patient is a 23y old  Male who presents with a chief complaint of Sepsis and NATALIA (06 Mar 2024 09:19)  Patient seen in follow up for NATALIA.        PAST MEDICAL HISTORY:  No pertinent past medical history      MEDICATIONS  (STANDING):  cefepime   IVPB      cefepime   IVPB 1000 milliGRAM(s) IV Intermittent every 12 hours  dextrose 5% + sodium chloride 0.45% 1000 milliLiter(s) (100 mL/Hr) IV Continuous <Continuous>  doxycycline IVPB 100 milliGRAM(s) IV Intermittent every 12 hours  heparin   Injectable 5000 Unit(s) SubCutaneous every 8 hours  lidocaine   4% Patch 2 Patch Transdermal every 24 hours  midodrine 10 milliGRAM(s) Oral every 8 hours  phenylephrine    Infusion 0.1 MICROgram(s)/kG/Min (3.64 mL/Hr) IV Continuous <Continuous>    MEDICATIONS  (PRN):  acetaminophen     Tablet .. 650 milliGRAM(s) Oral every 6 hours PRN Mild Pain (1 - 3)  acetaminophen     Tablet .. 650 milliGRAM(s) Oral every 6 hours PRN Temp greater or equal to 38C (100.4F)  melatonin 3 milliGRAM(s) Oral at bedtime PRN Insomnia  ondansetron Injectable 4 milliGRAM(s) IV Push every 4 hours PRN Nausea and/or Vomiting  traMADol 25 milliGRAM(s) Oral every 6 hours PRN Severe Pain (7 - 10)    T(C): 37 (24 @ 08:18), Max: 37.6 (24 @ 22:46)  HR: 131 (24 @ 09:45) (108 - 159)  BP: 93/45 (24 @ 09:45) (70/40 - 127/58)  RR: 21 (24 @ 09:45) (16 - 61)  SpO2: 94% (24 @ 09:45) (94% - 99%)  Wt(kg): --  I&O's Detail    05 Mar 2024 07:01  -  06 Mar 2024 07:00  --------------------------------------------------------  IN:    IV PiggyBack: 50 mL    IV PiggyBack: 100 mL    IV PiggyBack: 50 mL    Lactated Ringers: 375 mL    Norepinephrine: 3.2 mL    sodium chloride 0.9%: 500 mL  Total IN: 1078.2 mL    OUT:    IV PiggyBack: 0 mL    Voided (mL): 400 mL  Total OUT: 400 mL    Total NET: 678.2 mL          PHYSICAL EXAM:  General: No distress  Respiratory: b/l air entry  Cardiovascular: S1 S2  Gastrointestinal: soft  Extremities:  no edema                              12.9   18.07 )-----------( 145      ( 06 Mar 2024 04:30 )             36.4     03-06    133<L>  |  103  |  41<H>  ----------------------------<  89  4.6   |  19<L>  |  4.80<H>    Ca    7.4<L>      06 Mar 2024 04:30  Phos  2.3     03-06  Mg     1.0     03-06    TPro  5.5<L>  /  Alb  2.0<L>  /  TBili  3.1<H>  /  DBili  x   /  AST  37  /  ALT  55  /  AlkPhos  68  03-06    CARDIAC MARKERS ( 05 Mar 2024 12:42 )  x     / x     / 235 U/L / x     / x          LIVER FUNCTIONS - ( 06 Mar 2024 04:30 )  Alb: 2.0 g/dL / Pro: 5.5 g/dL / ALK PHOS: 68 U/L / ALT: 55 U/L / AST: 37 U/L / GGT: x           Urinalysis Basic - ( 06 Mar 2024 05:00 )    Color: Dark Yellow / Appearance: Cloudy / S.017 / pH: x  Gluc: x / Ketone: Trace mg/dL  / Bili: Small / Urobili: 1.0 mg/dL   Blood: x / Protein: 30 mg/dL / Nitrite: Negative   Leuk Esterase: Moderate / RBC: 3 /HPF / WBC 32 /HPF   Sq Epi: x / Non Sq Epi: x / Bacteria: Few /HPF        Sodium, Serum: 133 ( @ 04:30)  Sodium, Serum: 134 ( @ 12:42)    Creatinine, Serum: 4.80 ( @ 04:30)  Creatinine, Serum: 4.80 ( @ 12:42)    Potassium, Serum: 4.6 ( @ 04:30)  Potassium, Serum: 4.3 ( @ 12:42)    Hemoglobin: 12.9 ( @ 04:30)  Hemoglobin: 14.6 ( @ 12:42)        < from: US Kidney and Bladder (24 @ 15:30) >    ACC: [id number] EXAM:  US KIDNEYS AND BLADDER   ORDERED BY: SUDHIR MAY     PROCEDURE DATE:  2024          INTERPRETATION:  CLINICAL INFORMATION: Acute kidney injury    COMPARISON: No prior examinations are available for comparison at the   time of this interpretation.    TECHNIQUE: Sonography of the kidneys and bladder.    FINDINGS: Images of the right kidney which include the liver demonstrates   increased hepatic echogenicity in keeping with hepatic steatosis.    Right kidney: 11.2 cm. No hydronephrosis or calculi.    Left kidney: 12.1 cm. No hydronephrosis or calculi.    Urinary bladder: Minimally distended limiting detailed assessment    IMPRESSION:  Hepatic steatosis    No hydronephrosis        --- End of Report ---            MANUEL AGUIRRE MD; Attending Radiologist  This document has been electronically signed. Mar  5 2024  3:45PM    < end of copied text >  < from: CT Abdomen and Pelvis No Cont (24 @ 15:51) >    ACC: [id number] EXAM:  CT CHEST   ORDERED BY: ANNALISE BECKER     ACC: [id number] EXAM:  CT ABDOMEN AND PELVIS   ORDERED BY: ANNALISE BECKER     PROCEDURE DATE:  2024          INTERPRETATION:  CLINICAL INFORMATION: Fevers. Myalgias.    COMPARISON: None.    CONTRAST/COMPLICATIONS:  IV Contrast: None  Oral Contrast: None  Complications: None reported at time of study completion    PROCEDURE:  CT of the Chest, Abdomen and Pelvis was performed.  Sagittal and coronal reformats were performed.    FINDINGS:  CHEST:  LUNGS AND LARGE AIRWAYS: Patent central airways. Small bilateral   atelectasis. Mild biapical pleural parenchymal scarring. No evidence for   pulmonary consolidation or infiltrate.  PLEURA: No pleural effusion or pneumothorax.  VESSELS: Within normal limits.  HEART: Heart size is normal. No pericardial effusion.  MEDIASTINUM AND JULIA: Mildly enlarged left axillary lymph node measuring   1.3 cm in short axis. No evidence for enlarged mediastinal or hilar lymph   nodes.  CHEST WALL AND LOWER NECK: Within normal limits.    ABDOMEN AND PELVIS: Injection of the abdomen and pelvis is limited by   lack of IV contrast.  LIVER: Hepatic steatosis.  BILE DUCTS: Normal caliber.  GALLBLADDER: Within normal limits.  SPLEEN: Within normal limits.  PANCREAS: Within normal limits.  ADRENALS: The right adrenal appears unremarkable. Nonspecific mild left   adrenal thickening.  KIDNEYS/URETERS: Within normal limits. No evidence of a calculus in the   kidneys or ureters. No hydronephrosis.    BLADDER: Within normal limits.  REPRODUCTIVE ORGANS: The prostate and seminal vesicles appear grossly   unremarkable.    BOWEL: No bowel obstruction. Appendix slightly thickened measuring 8 mm   in caliber without adjacent stranding. Appendicolith in the proximal   appendix. Apparent focal distal esophageal mural thickening.  PERITONEUM: No free air or ascites.  VESSELS: Within normal limits.  RETROPERITONEUM/LYMPH NODES: No lymphadenopathy.  ABDOMINAL WALL: Small fat-containing umbilical hernia. Small  fat-containing bilateral inguinal hernias.  BONES: Within normal limits.    IMPRESSION:  No evidence for pulmonary consolidation or infiltrate.    Mildly enlarged left axillary lymph node    Appendix slightly thickened measuring 8 mm in caliber without adjacent   stranding. Appendicolith in the proximal appendix. Findings are equivocal   for acute appendicitis. Clinical correlation and continued clinical   observation are recommended.    Apparent focal distal esophageal mural thickening. EGD may be pursued for   further evaluation.    --- End of Report ---            NADIA GARNICA MD; Attending Radiologist  This document has been electronically signed. Mar  5 2024  4:37PM    < end of copied text >

## 2024-03-06 NOTE — PROGRESS NOTE ADULT - ASSESSMENT
23 year old male with no medical history presents with fevers and nausea associated with rash and axillary lymphadenopathy. Labs demonstrate leukocytosis with bandemia, acute renal failure, suspect ATN secondary to septic shock, and multiple electrolyte abnormalities. There is an 8 mm appendix and possible appendicolith. Low suspicion for acute appendicitis as per surgery. Earlier required vasopressor support which has been discontinued.    NEURO: Mentation at baseline  CVS: Received IVF resuscitation and briefly on vasopressors. Keep MAP > 65  PULM: Saturating well. Respiratory status stable  GI: Tolerating diet. GI consult for mural thickening of esophagus noted on CT  RENAL: Continue D5W + 0.45% saline with 75 mEq bicarbonate @ 100 cc/hr. Follow up with nephrology.   ID: Empiric cefepime, vancomycin by level, and doxycycline. Follow up cultures. ID following  PPX: HSQ for DVT PPX  Full Code  Patient and his father updated on plan of care 23 year old male with no medical history presents with fevers and nausea associated with rash and axillary lymphadenopathy. Labs demonstrate leukocytosis with bandemia, acute renal failure, suspect ATN secondary to septic shock, and multiple electrolyte abnormalities. There is an 8 mm appendix and possible appendicolith. Low suspicion for acute appendicitis as per surgery. Earlier required vasopressor support which has been discontinued.    NEURO: Mentation at baseline  CVS: Received IVF resuscitation and briefly on vasopressors. Keep MAP > 65  PULM: Saturating well. Respiratory status stable  GI: Tolerating diet. GI consult for mural thickening of esophagus noted on CT  RENAL: Continue D5W + 0.45% saline with 75 mEq bicarbonate @ 100 cc/hr. Follow up with nephrology. Supplemental electrolytes.  ID: Empiric cefepime, vancomycin by level, and doxycycline. Follow up cultures. ID following  PPX: HSQ for DVT PPX  Full Code  Patient and his father updated on plan of care

## 2024-03-06 NOTE — PROGRESS NOTE ADULT - ASSESSMENT
23M no med hx no meds or surgeries  presents for fever, rigors , nausea a few episodes of loose stools and emesis since last Saturday. On Sunday, he developed a diffuse macular papular pruritic rash. On Monday he went to urgent care and had a teaspoon of pus expressed from an abscess in his left axilla. Here with fever/Septic shock/Lactic acidosis/NATALIA/40% bandemia. Patient admitted to ICU with:    1. Sepsis  2. NATALIA  3. Hypotension    Neuro:  -normal mentation, A&Ox3  -Tylenol and Tramadol PRN for pain control  -no acute issues at this time  -CT head: no intracranial lesions    Cardio:  -Patient had persistent hypotension that is thus far volume responsive. Blood pressure has been normotensive now. Monitor blood pressures with Vasopressor support as needed.  -POCUS on 3/5/24: LVEF appears somewhat reduced no pericardial effusion.  No large visible vegetations as far as I can tell.  IVC sub 2cm but not collapsed.  Lungs are A line predominant.    -TTE conducted, f/u results    Pulm:  -no acute issues at this time.  -Patient currently on RA, saturating well.   -CT chest: no acute pathology    GI:  -initiate regular diet  -CT Abd: mildly enlarged L axillary node, Appendix slightly thickened measuring 8 mm in caliber without adjacent   stranding. Appendicolith in the proximal appendix. Findings are equivocal or acute appendicitis.   -Surgery following, recs appreciated, Patient is not a candidate for an appendectomy at this time.  -GI PCR ordered, f/u results    Renal:  -Cr currently elevated likely 2/2 NATALIA   -U/A results: cloudy, trace ketones and blood, small bili, mod LE, increased WBC, few bacteria, few granular casts and squamous epithelial cells  -Has received > 5 liters IVF in the past 24hrs, continue IVF (dextose 5%/NaCl 0.45%)    ID: Septic of an unknown etiology  -Initiated Cefepime IV, Doxycycline IV, and Vancomycin by level  -Lactate currently elevated but trending down (5.8-->4.2), elevated bands (43%)  -L axilla U/S: Small heterogeneous subcutaneous fluid collection in the left axilla region most likely corresponding with abscess.  -BCx and UCx collected, f/u results  -Franciella tularenesis and Lyme panel collected, f/u results  -TTE performed, f/u results to R/O endocarditis    Endo:  -no active issues at this time    Heme:  -SCDs for DVT prophylaxis    Dispo:   -Full code at this time.   23M no med hx no meds or surgeries  presents for fever, rigors , nausea a few episodes of loose stools and emesis since last Saturday. On Sunday, he developed a diffuse macular papular pruritic rash. On Monday he went to urgent care and had a teaspoon of pus expressed from an abscess in his left axilla. Here with fever/Septic shock/Lactic acidosis/NATALIA/40% bandemia. Patient admitted to ICU with:    1. Sepsis  2. NATALIA  3. Hypotension    Neuro:  -normal mentation, A&Ox3  -Tylenol, lidocaine patch, Tramadol PRN for pain control  -no acute issues at this time  -CT head: no intracranial lesions    Cardio:  -Patient had persistent hypotension that is thus far volume responsive. Blood pressure has been normotensive now. Monitor blood pressures with Vasopressor support with phenylephrine as needed.  -POCUS on 3/5/24: LVEF appears somewhat reduced no pericardial effusion.  No large visible vegetations noted.  IVC sub 2cm but not collapsed.  Lungs A line predominant.    -TTE conducted, normal systolic fx with EF 70-75%  -midorine 10mg TID to wean vasopressors    Pulm:  -no acute issues at this time  -Patient currently on RA, saturating well  -CT chest: no acute pathology    GI:  -initiate regular diet  -CT Abd: mildly enlarged L axillary node, Appendix slightly thickened measuring 8 mm in caliber without adjacent   stranding. Appendicolith in the proximal appendix. Findings are equivocal or acute appendicitis.   -Surgery following, recs appreciated, Patient is not a candidate for an appendectomy at this time.  -GI PCR ordered, f/u results    Renal:  -Cr 4.80 likely 2/2 ATN from sepsis  -U/A results: cloudy, trace ketones and blood, small bili, mod LE, increased WBC, few bacteria, few granular casts and squamous epithelial cells  -Has received > 5 liters IVF in the past 24hrs, continue IVF with dextose 5%/NaCl 0.45% per nephro  -KUB with hepatic steatosis, no hydronephrosis  -F/u urine cx    ID: Septic of an unknown etiology  -c/w Cefepime IV, Doxycycline IV, and Vancomycin by level  -Lactate currently elevated but trending down (8.6-->4.2), elevated bands (43%)  -L axilla U/S: Small heterogeneous subcutaneous fluid collection in the left axilla region most likely corresponding with abscess.  -BCx sent, f/u results  -Franciella tularenesis and Lyme panel collected, f/u results  -TTE performed, no findings c/f endocarditis    Endo:  -no active issues at this time    Heme:  -SCDs for DVT prophylaxis    Dispo:   -Full code at this time.   23M no med hx no meds or surgeries  presents for fever, rigors , nausea a few episodes of loose stools and emesis since last Saturday. On Sunday, he developed a diffuse macular papular pruritic rash. On Monday he went to urgent care and had a teaspoon of pus expressed from an abscess in his left axilla. Here with fever/Septic shock/Lactic acidosis/NATALIA/40% bandemia. Patient admitted to ICU with:    1. Sepsis  2. NATALIA  3. Hypotension    Neuro:  -normal mentation, A&Ox3  -Tylenol, lidocaine patch, Tramadol PRN for pain control  -no acute issues at this time  -CT head: no intracranial lesions    Cardio:  -Patient had persistent hypotension that is thus far volume responsive. Blood pressure has been normotensive now. Monitor blood pressures with Vasopressor support with phenylephrine as needed.  -POCUS on 3/5/24: LVEF appears somewhat reduced no pericardial effusion.  No large visible vegetations noted.  IVC sub 2cm but not collapsed.  Lungs A line predominant.    -TTE conducted, normal systolic fx with EF 70-75%  -midorine 10mg TID to wean vasopressors    Pulm:  -no acute issues at this time  -Patient currently on RA, saturating well  -CT chest: no acute pathology    GI:  -initiate regular diet  -CT Abd: mildly enlarged L axillary node, Appendix slightly thickened measuring 8 mm in caliber without adjacent   stranding. Appendicolith in the proximal appendix. Findings are equivocal or acute appendicitis.   -Surgery following, recs appreciated, Patient is not a candidate for an appendectomy at this time.  -GI PCR ordered, f/u results    Renal:  -Cr 4.80 likely 2/2 ATN from sepsis  -U/A results: cloudy, trace ketones and blood, small bili, mod LE, increased WBC, few bacteria, few granular casts and squamous epithelial cells  -Has received > 5 liters IVF in the past 24hrs, continue IVF with dextose 5%/NaCl 0.45% per nephro  -KUB with hepatic steatosis, no hydronephrosis  -F/u urine cx    ID: Septic of an unknown etiology  -c/w Cefepime IV, Doxycycline IV, and Vancomycin by level  -Lactate currently elevated but trending down (8.6-->4.2), elevated bands (43%)  -L axilla U/S: Small heterogeneous subcutaneous fluid collection in the left axilla region most likely corresponding with abscess.  -BCx sent, f/u results  -Franciella tularenesis and Lyme panel collected, f/u results  -TTE performed, no findings c/f endocarditis    Endo:  -no active issues at this time    Heme:  -subq heparin for DVT prophylaxis    Dispo:   -Full code at this time.

## 2024-03-06 NOTE — PROGRESS NOTE ADULT - PROBLEM SELECTOR PLAN 1
Patient meets sepsis criteria on admission: WBC >12,  >10% bands, HR >90,  likely 2/2 skin infection  -  ct a/p and chest performed  - Eliu following, recs appreciated, Patient is not a candidate for an appendectomy at this time. low suspicion for acute appendicitis   -GI PCR ordered, f/u results  - Continue IVF  lactic acidosis resolving   - Trend WBC and monitor for fever - rising WBC  - Tylenol 650mg for fever and mild pain q6  - Continue empiric cefepime, vancomycin by level, and doxycycline.  - F/u UA, UCx, BCx x2  - ID (Dr. Pitts) consulted recommendations appreciated  - management per ICU    ?septic shock - hypotensive requiring pressors. Monitor blood pressures with Vasopressor support with phenylephrine as needed. started on Midodrine to wean off pressors

## 2024-03-06 NOTE — PROGRESS NOTE ADULT - ATTENDING COMMENTS
23 year old male with no medical history presents with fevers and nausea associated with rash and axillary lymphadenopathy. Labs demonstrate leukocytosis with bandemia, acute renal failure, suspect ATN secondary to septic shock, and multiple electrolyte abnormalities. There is an 8 mm appendix and possible appendicolith. Low suspicion for acute appendicitis as per surgery. Earlier required vasopressor support which has been discontinued.    NEURO: Mentation at baseline  CVS: Received IVF resuscitation and briefly on vasopressors. Keep MAP > 65  PULM: Saturating well. Respiratory status stable  GI: Tolerating diet. GI consult for mural thickening of esophagus noted on CT  RENAL: Continue D5W + 0.45% saline with 75 mEq bicarbonate @ 100 cc/hr. Follow up with nephrology.   ID: Empiric cefepime, vancomycin by level, and doxycycline. Follow up cultures. ID following  PPX: HSQ for DVT PPX  Full Code  Patient and his father updated on plan of care

## 2024-03-07 LAB
ALBUMIN SERPL ELPH-MCNC: 1.8 G/DL — LOW (ref 3.3–5)
ALP SERPL-CCNC: 79 U/L — SIGNIFICANT CHANGE UP (ref 40–120)
ALT FLD-CCNC: 49 U/L — SIGNIFICANT CHANGE UP (ref 12–78)
ANION GAP SERPL CALC-SCNC: 8 MMOL/L — SIGNIFICANT CHANGE UP (ref 5–17)
APPEARANCE UR: CLEAR — SIGNIFICANT CHANGE UP
AST SERPL-CCNC: 29 U/L — SIGNIFICANT CHANGE UP (ref 15–37)
BASE EXCESS BLDA CALC-SCNC: -2.4 MMOL/L — LOW (ref -2–3)
BASOPHILS # BLD AUTO: 0 K/UL — SIGNIFICANT CHANGE UP (ref 0–0.2)
BASOPHILS NFR BLD AUTO: 0 % — SIGNIFICANT CHANGE UP (ref 0–2)
BILIRUB SERPL-MCNC: 1.7 MG/DL — HIGH (ref 0.2–1.2)
BILIRUB UR-MCNC: NEGATIVE — SIGNIFICANT CHANGE UP
BLOOD GAS COMMENTS ARTERIAL: SIGNIFICANT CHANGE UP
BUN SERPL-MCNC: 57 MG/DL — HIGH (ref 7–23)
CALCIUM SERPL-MCNC: 7.7 MG/DL — LOW (ref 8.5–10.1)
CHLORIDE SERPL-SCNC: 103 MMOL/L — SIGNIFICANT CHANGE UP (ref 96–108)
CO2 SERPL-SCNC: 24 MMOL/L — SIGNIFICANT CHANGE UP (ref 22–31)
COLOR SPEC: YELLOW — SIGNIFICANT CHANGE UP
CREAT SERPL-MCNC: 4.1 MG/DL — HIGH (ref 0.5–1.3)
CULTURE RESULTS: NO GROWTH — SIGNIFICANT CHANGE UP
DIFF PNL FLD: ABNORMAL
EGFR: 20 ML/MIN/1.73M2 — LOW
EOSINOPHIL # BLD AUTO: 0.07 K/UL — SIGNIFICANT CHANGE UP (ref 0–0.5)
EOSINOPHIL NFR BLD AUTO: 0.2 % — SIGNIFICANT CHANGE UP (ref 0–6)
EPI CELLS # UR: PRESENT
F TULAR IGG SER QL IA: NEGATIVE — SIGNIFICANT CHANGE UP
F TULAR IGM SER QL IA: NEGATIVE — SIGNIFICANT CHANGE UP
F. TULARENSIS IGG+ IGM PNL SER: SIGNIFICANT CHANGE UP
FRANCISELLA TULARENSIS ANTIBODY, IGG: NEGATIVE — SIGNIFICANT CHANGE UP
FRANCISELLA TULARENSIS ANTIBODY, IGM: NEGATIVE — SIGNIFICANT CHANGE UP
FRANCISELLA TULARENSIS INTERPRETATION: SIGNIFICANT CHANGE UP
GAS PNL BLDA: SIGNIFICANT CHANGE UP
GLUCOSE SERPL-MCNC: 117 MG/DL — HIGH (ref 70–99)
GLUCOSE UR QL: NEGATIVE MG/DL — SIGNIFICANT CHANGE UP
HCO3 BLDA-SCNC: 22 MMOL/L — SIGNIFICANT CHANGE UP (ref 21–28)
HCT VFR BLD CALC: 32.6 % — LOW (ref 39–50)
HGB BLD-MCNC: 11.7 G/DL — LOW (ref 13–17)
HOROWITZ INDEX BLDA+IHG-RTO: 21 — SIGNIFICANT CHANGE UP
IMM GRANULOCYTES NFR BLD AUTO: 0.3 % — SIGNIFICANT CHANGE UP (ref 0–0.9)
KETONES UR-MCNC: NEGATIVE MG/DL — SIGNIFICANT CHANGE UP
LACTATE SERPL-SCNC: 1.9 MMOL/L — SIGNIFICANT CHANGE UP (ref 0.7–2)
LEUKOCYTE ESTERASE UR-ACNC: NEGATIVE — SIGNIFICANT CHANGE UP
LYMPHOCYTES # BLD AUTO: 1.05 K/UL — SIGNIFICANT CHANGE UP (ref 1–3.3)
LYMPHOCYTES # BLD AUTO: 3.6 % — LOW (ref 13–44)
MAGNESIUM SERPL-MCNC: 2.1 MG/DL — SIGNIFICANT CHANGE UP (ref 1.6–2.6)
MCHC RBC-ENTMCNC: 29.5 PG — SIGNIFICANT CHANGE UP (ref 27–34)
MCHC RBC-ENTMCNC: 35.9 GM/DL — SIGNIFICANT CHANGE UP (ref 32–36)
MCV RBC AUTO: 82.1 FL — SIGNIFICANT CHANGE UP (ref 80–100)
MONOCYTES # BLD AUTO: 0.87 K/UL — SIGNIFICANT CHANGE UP (ref 0–0.9)
MONOCYTES NFR BLD AUTO: 2.9 % — SIGNIFICANT CHANGE UP (ref 2–14)
NEUTROPHILS # BLD AUTO: 27.43 K/UL — HIGH (ref 1.8–7.4)
NEUTROPHILS NFR BLD AUTO: 93 % — HIGH (ref 43–77)
NITRITE UR-MCNC: NEGATIVE — SIGNIFICANT CHANGE UP
NRBC # BLD: 0 /100 WBCS — SIGNIFICANT CHANGE UP (ref 0–0)
PCO2 BLDA: 34 MMHG — LOW (ref 35–48)
PH BLDA: 7.42 — SIGNIFICANT CHANGE UP (ref 7.35–7.45)
PH UR: 5.5 — SIGNIFICANT CHANGE UP (ref 5–8)
PHOSPHATE SERPL-MCNC: 3.1 MG/DL — SIGNIFICANT CHANGE UP (ref 2.5–4.5)
PLATELET # BLD AUTO: 100 K/UL — LOW (ref 150–400)
PO2 BLDA: 77 MMHG — LOW (ref 83–108)
POTASSIUM SERPL-MCNC: 3.9 MMOL/L — SIGNIFICANT CHANGE UP (ref 3.5–5.3)
POTASSIUM SERPL-SCNC: 3.9 MMOL/L — SIGNIFICANT CHANGE UP (ref 3.5–5.3)
PROT SERPL-MCNC: 5.4 G/DL — LOW (ref 6–8.3)
PROT UR-MCNC: SIGNIFICANT CHANGE UP MG/DL
RBC # BLD: 3.97 M/UL — LOW (ref 4.2–5.8)
RBC # FLD: 12.9 % — SIGNIFICANT CHANGE UP (ref 10.3–14.5)
RBC CASTS # UR COMP ASSIST: 0 /HPF — SIGNIFICANT CHANGE UP (ref 0–4)
SAO2 % BLDA: 96.8 % — SIGNIFICANT CHANGE UP (ref 94–98)
SODIUM SERPL-SCNC: 135 MMOL/L — SIGNIFICANT CHANGE UP (ref 135–145)
SP GR SPEC: 1.01 — SIGNIFICANT CHANGE UP (ref 1–1.03)
SPECIMEN SOURCE: SIGNIFICANT CHANGE UP
UROBILINOGEN FLD QL: 0.2 MG/DL — SIGNIFICANT CHANGE UP (ref 0.2–1)
VANCOMYCIN TROUGH SERPL-MCNC: 7.9 UG/ML — LOW (ref 10–20)
WBC # BLD: 29.51 K/UL — HIGH (ref 3.8–10.5)
WBC # FLD AUTO: 29.51 K/UL — HIGH (ref 3.8–10.5)
WBC UR QL: 0 /HPF — SIGNIFICANT CHANGE UP (ref 0–5)

## 2024-03-07 PROCEDURE — 99232 SBSQ HOSP IP/OBS MODERATE 35: CPT

## 2024-03-07 PROCEDURE — 71045 X-RAY EXAM CHEST 1 VIEW: CPT | Mod: 26

## 2024-03-07 PROCEDURE — 99291 CRITICAL CARE FIRST HOUR: CPT | Mod: GC

## 2024-03-07 PROCEDURE — 99291 CRITICAL CARE FIRST HOUR: CPT

## 2024-03-07 PROCEDURE — 99231 SBSQ HOSP IP/OBS SF/LOW 25: CPT

## 2024-03-07 PROCEDURE — 93970 EXTREMITY STUDY: CPT | Mod: 26

## 2024-03-07 RX ORDER — LIDOCAINE HCL 20 MG/ML
20 VIAL (ML) INJECTION ONCE
Refills: 0 | Status: COMPLETED | OUTPATIENT
Start: 2024-03-07 | End: 2024-03-07

## 2024-03-07 RX ORDER — MULTIVIT-MIN/FERROUS GLUCONATE 9 MG/15 ML
1 LIQUID (ML) ORAL DAILY
Refills: 0 | Status: DISCONTINUED | OUTPATIENT
Start: 2024-03-07 | End: 2024-03-09

## 2024-03-07 RX ORDER — DIPHENHYDRAMINE HYDROCHLORIDE AND LIDOCAINE HYDROCHLORIDE AND ALUMINUM HYDROXIDE AND MAGNESIUM HYDRO
10 KIT
Refills: 0 | Status: DISCONTINUED | OUTPATIENT
Start: 2024-03-07 | End: 2024-03-09

## 2024-03-07 RX ORDER — VANCOMYCIN HCL 1 G
1000 VIAL (EA) INTRAVENOUS ONCE
Refills: 0 | Status: COMPLETED | OUTPATIENT
Start: 2024-03-07 | End: 2024-03-07

## 2024-03-07 RX ORDER — SODIUM CHLORIDE 9 MG/ML
1000 INJECTION, SOLUTION INTRAVENOUS
Refills: 0 | Status: DISCONTINUED | OUTPATIENT
Start: 2024-03-07 | End: 2024-03-09

## 2024-03-07 RX ORDER — DIPHENHYDRAMINE HYDROCHLORIDE AND LIDOCAINE HYDROCHLORIDE AND ALUMINUM HYDROXIDE AND MAGNESIUM HYDRO
10 KIT ONCE
Refills: 0 | Status: COMPLETED | OUTPATIENT
Start: 2024-03-07 | End: 2024-03-07

## 2024-03-07 RX ADMIN — PANTOPRAZOLE SODIUM 40 MILLIGRAM(S): 20 TABLET, DELAYED RELEASE ORAL at 17:08

## 2024-03-07 RX ADMIN — CEFTRIAXONE 100 MILLIGRAM(S): 500 INJECTION, POWDER, FOR SOLUTION INTRAMUSCULAR; INTRAVENOUS at 21:26

## 2024-03-07 RX ADMIN — LIDOCAINE 2 PATCH: 4 CREAM TOPICAL at 08:09

## 2024-03-07 RX ADMIN — Medication 100 MILLIGRAM(S): at 17:04

## 2024-03-07 RX ADMIN — Medication 100 MILLIGRAM(S): at 05:19

## 2024-03-07 RX ADMIN — MIDODRINE HYDROCHLORIDE 10 MILLIGRAM(S): 2.5 TABLET ORAL at 05:16

## 2024-03-07 RX ADMIN — Medication 650 MILLIGRAM(S): at 00:30

## 2024-03-07 RX ADMIN — CEFTRIAXONE 100 MILLIGRAM(S): 500 INJECTION, POWDER, FOR SOLUTION INTRAMUSCULAR; INTRAVENOUS at 09:11

## 2024-03-07 RX ADMIN — SODIUM CHLORIDE 100 MILLILITER(S): 9 INJECTION, SOLUTION INTRAVENOUS at 08:09

## 2024-03-07 RX ADMIN — SODIUM CHLORIDE 125 MILLILITER(S): 9 INJECTION, SOLUTION INTRAVENOUS at 09:15

## 2024-03-07 RX ADMIN — SODIUM CHLORIDE 125 MILLILITER(S): 9 INJECTION, SOLUTION INTRAVENOUS at 17:04

## 2024-03-07 RX ADMIN — Medication 20 MILLILITER(S): at 08:56

## 2024-03-07 RX ADMIN — PANTOPRAZOLE SODIUM 40 MILLIGRAM(S): 20 TABLET, DELAYED RELEASE ORAL at 05:16

## 2024-03-07 RX ADMIN — HEPARIN SODIUM 5000 UNIT(S): 5000 INJECTION INTRAVENOUS; SUBCUTANEOUS at 21:27

## 2024-03-07 RX ADMIN — ONDANSETRON 4 MILLIGRAM(S): 8 TABLET, FILM COATED ORAL at 08:09

## 2024-03-07 RX ADMIN — DIPHENHYDRAMINE HYDROCHLORIDE AND LIDOCAINE HYDROCHLORIDE AND ALUMINUM HYDROXIDE AND MAGNESIUM HYDRO 10 MILLILITER(S): KIT at 17:13

## 2024-03-07 RX ADMIN — HEPARIN SODIUM 5000 UNIT(S): 5000 INJECTION INTRAVENOUS; SUBCUTANEOUS at 05:16

## 2024-03-07 RX ADMIN — DIPHENHYDRAMINE HYDROCHLORIDE AND LIDOCAINE HYDROCHLORIDE AND ALUMINUM HYDROXIDE AND MAGNESIUM HYDRO 10 MILLILITER(S): KIT at 09:29

## 2024-03-07 RX ADMIN — Medication 1 TABLET(S): at 21:34

## 2024-03-07 RX ADMIN — DIPHENHYDRAMINE HYDROCHLORIDE AND LIDOCAINE HYDROCHLORIDE AND ALUMINUM HYDROXIDE AND MAGNESIUM HYDRO 10 MILLILITER(S): KIT at 11:46

## 2024-03-07 RX ADMIN — HEPARIN SODIUM 5000 UNIT(S): 5000 INJECTION INTRAVENOUS; SUBCUTANEOUS at 14:13

## 2024-03-07 RX ADMIN — Medication 250 MILLIGRAM(S): at 09:32

## 2024-03-07 NOTE — DIETITIAN INITIAL EVALUATION ADULT - ADD RECOMMEND
Ensure max 8oz po BID (chocolate). GI consult to r/o lipid metabolism disorder. Vitamin D 25-hydoxy level. Fat soluble vitamin supplementation (A, D, E, K).  Ensure max 8oz po BID (chocolate). MVI with minerals daily. GI consult to r/o lipid metabolism disorder. Vitamin D 25-hydoxy level. Additional fat soluble vitamin supplementation (A, D, E, K) per MD order.

## 2024-03-07 NOTE — PROGRESS NOTE ADULT - SUBJECTIVE AND OBJECTIVE BOX
SURGERY PA NOTE ON BEHALF OF DR. Mckinley / General SURGERY:    S: Patient seen and examined at bedside.     Denies abdominal pain.   Slight discomfort in left armpit where spontaneous drainage of abscess.   Denies fever, chills, chest pain, sob.     MEDICATIONS:  acetaminophen     Tablet .. 650 milliGRAM(s) Oral every 6 hours PRN  acetaminophen     Tablet .. 650 milliGRAM(s) Oral every 6 hours PRN  cefTRIAXone   IVPB 2000 milliGRAM(s) IV Intermittent every 12 hours  dextrose 5% + sodium chloride 0.45% 1000 milliLiter(s) IV Continuous <Continuous>  doxycycline IVPB 100 milliGRAM(s) IV Intermittent every 12 hours  FIRST- Mouthwash  BLM 10 milliLiter(s) Swish and Spit four times a day  heparin   Injectable 5000 Unit(s) SubCutaneous every 8 hours  lidocaine   4% Patch 2 Patch Transdermal every 24 hours  melatonin 3 milliGRAM(s) Oral at bedtime PRN  midodrine 10 milliGRAM(s) Oral every 8 hours  ondansetron Injectable 4 milliGRAM(s) IV Push every 4 hours PRN  pantoprazole  Injectable 40 milliGRAM(s) IV Push every 12 hours  phenylephrine    Infusion 0.1 MICROgram(s)/kG/Min IV Continuous <Continuous>  traMADol 25 milliGRAM(s) Oral every 6 hours PRN      O:  Vital Signs Last 24 Hrs  T(C): 36.4 (07 Mar 2024 08:02), Max: 38.2 (06 Mar 2024 23:42)  T(F): 97.5 (07 Mar 2024 08:02), Max: 100.7 (06 Mar 2024 23:42)  HR: 106 (07 Mar 2024 08:02) (102 - 148)  BP: 117/59 (07 Mar 2024 08:02) (88/46 - 125/59)  BP(mean): 82 (07 Mar 2024 08:02) (63 - 84)  RR: 30 (07 Mar 2024 08:02) (19 - 48)  SpO2: 92% (07 Mar 2024 08:02) (91% - 97%)    Parameters below as of 06 Mar 2024 23:00  Patient On (Oxygen Delivery Method): nasal cannula  O2 Flow (L/min): 2      I&O SUMMARY:    03-06-24 @ 07:01  -  03-07-24 @ 07:00  --------------------------------------------------------  IN: 2450 mL / OUT: 5000 mL / NET: -2550 mL    03-07-24 @ 07:01  -  03-07-24 @ 09:01  --------------------------------------------------------  IN: 200 mL / OUT: 800 mL / NET: -600 mL        PHYSICAL EXAM:  Lungs: CTA bilat without W/R/R  Card: S1S2  Abd: Soft, NT, ND.  +BS x 4.  No rebound/guarding.   Left axilla: palpable hard small nodule in left axillar, spontaneous drainage of seropurulent fluid from axillary abscess. Tenderness to expression.   Skin: diffuse erythematous rash in bilateral LEs, warm to touch.   Neuro: awake, alert and oriented x 3.     LABS:                        11.7   29.51 )-----------( 100      ( 07 Mar 2024 04:37 )             32.6     03-07    135  |  103  |  57<H>  ----------------------------<  117<H>  3.9   |  24  |  4.10<H>    Ca    7.7<L>      07 Mar 2024 04:37  Phos  3.1     03-07  Mg     2.1     03-07    TPro  5.4<L>  /  Alb  1.8<L>  /  TBili  1.7<H>  /  DBili  x   /  AST  29  /  ALT  49  /  AlkPhos  79  03-07    PT/INR - ( 06 Mar 2024 09:38 )   PT: 16.1 sec;   INR: 1.39 ratio         PTT - ( 06 Mar 2024 09:38 )  PTT:43.0 sec          RADIOLOGY:    A: 22 yo male a/w axillary abscess, sepsis and NATALIA.    General surgery consulted for imaging findings of 0.8 cm appendix without adjacent stranding, abscess or perforation. Abdomen soft, nd, nt, negative peritonitic sign. No clinical suspicion for acute appendicitis.     Small left axillary abscess, spontaneously draining. Approximately 1 cm in size with 0.5cm tract exiting skin in left axilla. Minimal erythema. No overt cellulitis. Formal I&D not indicated at this time.      Lactate normalized to 1.9   Increasing WBC 18>21>29, received steroid yesterday.   Improving Cr level 4.8>4.8>4.1     P:  - Cont reg diet  - Continue antibiotics per ID   - Pain control as needed  - Trend daily labs  - Appreciate ICU care  - fu Blood cultures  - Above to be discussed with Dr. Mckinley

## 2024-03-07 NOTE — DIETITIAN INITIAL EVALUATION ADULT - PROBLEM/PLAN-4
Kota Swann Patient Age: 59 year old  MESSAGE: Interpreting service used: No      IM/FP- Medication Question-     Name of the Pharmacy: [facility]     Name of the medication: Valacyclovir   Question about: Medication not covered by patient's insurance     Suggested alternative medication (if provided): n/a     Is the patient currently at the pharmacy? No- Routed message to Provider's Clinical Pool       ALLERGIES:  Patient has no known allergies.  Current Outpatient Medications   Medication   • Valacyclovir HCl 1000 MG Tab   • zolpidem (Ambien) 5 MG tablet   • simvastatin (ZOCOR) 20 MG tablet   • Flaxseed, Linseed, (FLAXSEED OIL PO)   • cyanocobalamin (VITAMIN B-12) 500 MCG tablet   • cholecalciferol (VITAMIN D3) 1000 UNITS tablet   • nicotinic acid (NIACIN) 500 MG tablet   • aspirin 81 MG EC tablet     No current facility-administered medications for this visit.     PHARMACY to use: see below           Pharmacy preference(s) on file:   [id number] - [place], [place] - [address]  [address] [address]  Phone: [phone] Fax: [phone]      CALL BACK INFO: Ok to leave response (including medical information) with family member or on answering machine      PCP: Mira Su MD         INS: Payor: [employer] / Plan: [employer] / Product Type: COMM   PATIENT ADDRESS:  [address] [place] [id number]     DISPLAY PLAN FREE TEXT

## 2024-03-07 NOTE — CONSULT NOTE ADULT - ASSESSMENT
23 year old male with no medical history presents with fevers and nausea associated with rash and axillary lymphadenopathy. Had recent abscess from left axilla drained on Monday (3/4). Labs here demonstrate leukocytosis with bandemia, metabolic acidosis, and acute renal failure. Was admitted to ICU for further management of distributive shock state requiring vasopressor support.     Sinus Tachycardia  - Continued tachycardia HR in 100s likely 2/2 to ongoing infectious process and distributive shock   - Patient is not complaining of any cardiac symptoms at this time.  - No clear evidence of acute ischemia, trops negative x 2. Will follow up third set.  - EKG shows sinus tachycardia 135bpm. No acute changes on EKG compared to previous.  - No meaningful evidence of volume overload.  - TTE 3/6/24 with normal LV systolic function with LVEF 70-75%. Tachycardic in the range of 130-135 bpm throughout the examination.   - BP soft systolic in 100s, monitor routine hemodynamics.   - Off pressors and midodrine now.   - Monitor and replete lytes, keep K>4, Mg>2.  - BCx NGTD, Abx per ID      - Other cardiovascular workup will depend on clinical course.  - All other workup per primary team.  - Will continue to follow.             23 year old male with no medical history presents with fevers and nausea associated with rash and axillary lymphadenopathy. Had recent abscess from left axilla drained on Monday (3/4). Labs here demonstrate leukocytosis with bandemia, metabolic acidosis, and acute renal failure. Was admitted to ICU for further management of distributive shock state requiring vasopressor support.     Sinus Tachycardia  - Continued tachycardia HR in 100s likely 2/2 to ongoing infectious process and distributive shock.   - Patient is not complaining of any cardiac symptoms at this time.   - EKG shows sinus tachycardia 135bpm. No acute changes on EKG compared to previous.  - No meaningful evidence of volume overload.  - TTE 3/6/24 with normal LV systolic function with LVEF 70-75%. Tachycardic in the range of 130-135 bpm throughout the examination.   - BP soft systolic in 100s, monitor routine hemodynamics.   - Off pressors and midodrine now.   - Monitor and replete lytes, keep K>4, Mg>2.  - BCx NGTD, Abx per ID      - Other cardiovascular workup will depend on clinical course.  - All other workup per primary team.  - Will continue to follow.

## 2024-03-07 NOTE — CARE COORDINATION ASSESSMENT. - OTHER PERTINENT DISCHARGE PLANNING INFORMATION:
Met with patient and his father at bedside. Patient consents to conversation with father present. role of CM/transition planning explained. Patient and dad verbalize understanding. Transition information provided at this time. Patient lives in private house, [address] with parents and brother. Patient is independent with all ADL's and ambulation, drives and works full-time as  PTA. No needs/HCS present PTA. No new needs anticipated. Pt's parents will transport home on DC. CM will continue to follow throughout hospital stay. CM contact info provided.

## 2024-03-07 NOTE — DIETITIAN INITIAL EVALUATION ADULT - PROBLEM SELECTOR PLAN 1
Patient meets sepsis criteria on admission: WBC >12,  >10% bands, HR >90,  likely 2/2 skin infection  - CXR: no acute finding  - f/u ct a/p and chest  - Given IV NS 1 L bolus x1 in ED  - Continue IV NS @ 100 cc/hr    - Lactate 8.6 on admission, f/u repeat lactate (20:00)  - Trend WBC and monitor for fever  - Tylenol 650mg for fever and mild pain q6  -  start with vancomycin and cefepime with florastor   - F/u UA, UCx, BCx x2  - ID (Dr. Pitts) consulted, f/u recs

## 2024-03-07 NOTE — PROGRESS NOTE ADULT - SUBJECTIVE AND OBJECTIVE BOX
Patient is a 23y old  Male who presents with a chief complaint of Sepsis and NATALIA (06 Mar 2024 15:00)      BRIEF HOSPITAL COURSE: 23 year old male with no medical history presents with fevers and nausea associated with rash and axillary lymphadenopathy. Had recent abscess from left axilla drained on Monday (3/4). Labs here demonstrate leukocytosis with bandemia, metabolic acidosis, and acute renal failure. Was admitted to ICU for further management of distributive shock state requiring vasopressor support.     Events last 24 hours: Persistently febrile. Mildly tachypneic and tachycardic overnight. Placed on NC with repeat imaging ordered. Endorses no active complaints.      PAST MEDICAL & SURGICAL HISTORY:  No pertinent past medical history      No significant past surgical history        Allergies    No Known Allergies    Intolerances      FAMILY HISTORY:  FH: type 2 diabetes (Father)    FH: hyperlipidemia (Father, Mother)        Review of Systems: As noted in HPI       Medications:  cefTRIAXone   IVPB 2000 milliGRAM(s) IV Intermittent every 12 hours  doxycycline IVPB 100 milliGRAM(s) IV Intermittent every 12 hours    midodrine 10 milliGRAM(s) Oral every 8 hours  phenylephrine    Infusion 0.1 MICROgram(s)/kG/Min IV Continuous <Continuous>      acetaminophen     Tablet .. 650 milliGRAM(s) Oral every 6 hours PRN  acetaminophen     Tablet .. 650 milliGRAM(s) Oral every 6 hours PRN  melatonin 3 milliGRAM(s) Oral at bedtime PRN  ondansetron Injectable 4 milliGRAM(s) IV Push every 4 hours PRN  traMADol 25 milliGRAM(s) Oral every 6 hours PRN      heparin   Injectable 5000 Unit(s) SubCutaneous every 8 hours    pantoprazole  Injectable 40 milliGRAM(s) IV Push every 12 hours        dextrose 5% + sodium chloride 0.45% 1000 milliLiter(s) IV Continuous <Continuous>      lidocaine   4% Patch 2 Patch Transdermal every 24 hours            ICU Vital Signs Last 24 Hrs  T(C): 37.7 (07 Mar 2024 01:00), Max: 38.2 (06 Mar 2024 23:42)  T(F): 99.9 (07 Mar 2024 01:00), Max: 100.7 (06 Mar 2024 23:42)  HR: 115 (07 Mar 2024 04:00) (115 - 159)  BP: 110/54 (07 Mar 2024 04:00) (83/48 - 127/58)  BP(mean): 74 (07 Mar 2024 04:00) (59 - 88)  ABP: --  ABP(mean): --  RR: 38 (07 Mar 2024 04:00) (19 - 61)  SpO2: 93% (07 Mar 2024 04:00) (91% - 98%)    O2 Parameters below as of 06 Mar 2024 23:00  Patient On (Oxygen Delivery Method): nasal cannula  O2 Flow (L/min): 2        Vital Signs Last 24 Hrs  T(C): 37.7 (07 Mar 2024 01:00), Max: 38.2 (06 Mar 2024 23:42)  T(F): 99.9 (07 Mar 2024 01:00), Max: 100.7 (06 Mar 2024 23:42)  HR: 115 (07 Mar 2024 04:00) (115 - 159)  BP: 110/54 (07 Mar 2024 04:00) (83/48 - 127/58)  BP(mean): 74 (07 Mar 2024 04:00) (59 - 88)  RR: 38 (07 Mar 2024 04:00) (19 - 61)  SpO2: 93% (07 Mar 2024 04:00) (91% - 98%)    Parameters below as of 06 Mar 2024 23:00  Patient On (Oxygen Delivery Method): nasal cannula  O2 Flow (L/min): 2          I&O's Detail    05 Mar 2024 07:01  -  06 Mar 2024 07:00  --------------------------------------------------------  IN:    IV PiggyBack: 50 mL    IV PiggyBack: 100 mL    IV PiggyBack: 50 mL    Lactated Ringers: 375 mL    Norepinephrine: 3.2 mL    sodium chloride 0.9%: 500 mL  Total IN: 1078.2 mL    OUT:    IV PiggyBack: 0 mL    Voided (mL): 400 mL  Total OUT: 400 mL    Total NET: 678.2 mL      06 Mar 2024 07:01  -  07 Mar 2024 05:10  --------------------------------------------------------  IN:    dextrose 5% + sodium chloride 0.45% w/ Additives: 1900 mL    IV PiggyBack: 50 mL    IV PiggyBack: 50 mL    IV PiggyBack: 100 mL    IV PiggyBack: 50 mL  Total IN: 2150 mL    OUT:    Voided (mL): 3600 mL  Total OUT: 3600 mL    Total NET: -1450 mL            LABS:                        12.6   21.76 )-----------( 120      ( 06 Mar 2024 17:30 )             35.6     03-06    131<L>  |  100  |  55<H>  ----------------------------<  132<H>  4.3   |  20<L>  |  4.80<H>    Ca    7.3<L>      06 Mar 2024 17:30  Phos  2.7     03-06  Mg     1.9     03-06    TPro  5.5<L>  /  Alb  2.0<L>  /  TBili  3.1<H>  /  DBili  x   /  AST  37  /  ALT  55  /  AlkPhos  68  03-06      CARDIAC MARKERS ( 05 Mar 2024 12:42 )  x     / x     / 235 U/L / x     / x          CAPILLARY BLOOD GLUCOSE        PT/INR - ( 06 Mar 2024 09:38 )   PT: 16.1 sec;   INR: 1.39 ratio         PTT - ( 06 Mar 2024 09:38 )  PTT:43.0 sec  Urinalysis Basic - ( 06 Mar 2024 17:30 )    Color: x / Appearance: x / SG: x / pH: x  Gluc: 132 mg/dL / Ketone: x  / Bili: x / Urobili: x   Blood: x / Protein: x / Nitrite: x   Leuk Esterase: x / RBC: x / WBC x   Sq Epi: x / Non Sq Epi: x / Bacteria: x      CULTURES:  Culture Results:   No growth at 24 hours (03-05 @ 14:45)  Culture Results:   No growth at 24 hours (03-05 @ 14:33)      Physical Examination:    General: NAD. A&Ox3.     HEENT: Pupils equal, reactive to light.  Symmetric.    PULM: Mildly tachypneic. CTA b/l     CVS: Tachycardia. Normal s1/s2    ABD: Soft, nondistended, nontender, normoactive bowel sounds, no masses    EXT: No edema, nontender    SKIN: Warm and well perfused, no rashes noted.    RADIOLOGY: < from: US Axilla Only, Left (03.06.24 @ 08:19) >  IMPRESSION:    Small heterogeneous subcutaneous fluid collection in the left axilla   region as discussed, which may correspond to abscess.    < from: CT Abdomen and Pelvis No Cont (03.05.24 @ 15:51) >  IMPRESSION:  No evidence for pulmonary consolidation or infiltrate.    Mildly enlarged left axillary lymph node    Appendix slightly thickened measuring 8 mm in caliber without adjacent   stranding. Appendicolith in the proximal appendix. Findings are equivocal   for acute appendicitis. Clinical correlation and continued clinical   observation are recommended.    Apparent focal distal esophageal mural thickening. EGD may be pursued for   further evaluation.    < from: CT Head No Cont (03.05.24 @ 15:43) >  IMPRESSION:  No focal intracranial lesion.    < from: US Kidney and Bladder (03.05.24 @ 15:30) >  IMPRESSION:  Hepatic steatosis    No hydronephrosis    < from: TTE W or WO Ultrasound Enhancing Agent (03.06.24 @ 08:30) >  CONCLUSIONS:      1. Left ventricular cavity is normal in size. Left ventricular systolic function is hyperdynamic with an ejection fraction visually estimated at 70 to 75 %.   2. The right ventricle is not well visualized. probably normal systolic function.   3. The left atrium is normal.   4. Structurally normal mitral valve with normal leaflet excursion.   5. Structurally normal tricuspid valve with normal leaflet excursion.   6. Trileaflet aortic valve with normal systolic excursion.   7. No pericardial effusion seen.   8. The patient is tachycardic with heart rates in the range of 130-135 beats per minute throughout this examination.

## 2024-03-07 NOTE — PATIENT CHOICE NOTE. - NSPTCHOICESTATE_GEN_ALL_CORE

## 2024-03-07 NOTE — DIETITIAN INITIAL EVALUATION ADULT - OTHER INFO
23 year old male with no medical history presents with fevers and nausea associated with rash and axillary lymphadenopathy. Had recent abscess from left axilla drained on Monday (3/4). Labs here demonstrate leukocytosis with bandemia, metabolic acidosis, and acute renal failure. Was admitted to ICU for further management of distributive shock state requiring vasopressor support.     Pt A+Ox4 with parents at bedside. Regular, Halal diet rx. Poor appetite/po intake over past 4 days as pt has not been feeling well. Intermittent nausea. Loose stool 3/5 pta. No BM since. On IVF-LR@125cc/hr. Per pt regular diet pta. . Has gained some weight over past 6 months so has been trying to cut back. Admission weight 214lbs. Over the past couple months has been cutting back on soda, juice, sweets/snacks. Consumes 3 meals/day in house. Typical 24hr diet recall: Breakfast Egg sandwhich with hash browns or protein bar, Lunch- Chicken or beef with rice/vegetabels, Dinner- Chicken rice vegetables. Per pt no ETOH consumption or smoking pta. Has been exercising 4x/week recently. Per lipid profile elevated  however very low Chol 88, HDL 9, LDL 20 possibly indicative of lipid metabolism disorder (familial hypobetalipoproteinemia?). Family hx lipid disorders. Recommend GI consult. Hepatic steatosis per CT. Recommend low fat diet with emphasis on importance of HBV protein sources at all meals. Recommend ensure max 8oz BID while appetite/po intake poor. Recommend fat soluble vitamin supplementation.

## 2024-03-07 NOTE — DIETITIAN INITIAL EVALUATION ADULT - ETIOLOGY
related to unknown causes (possible lipid metabolism disorder) related to decreased ability to consume sufficient energy

## 2024-03-07 NOTE — CARE COORDINATION ASSESSMENT. - NSCAREPROVIDERS_GEN_ALL_CORE_FT
CARE PROVIDERS:  Accepting Physician: Corey Juarez  Access Services: Pratik Ramesh  Administration: Seamus Pan  Admitting: Corey Juarez  Attending: Corey Juarez  Case Management: Summer Avila  Clinical Doc. Improvement: Mohamud Mendenhall  Consultant: Royer Singh  Consultant: Sam Valdivia  Consultant: Seamus Ramírez  Consultant: Perico Francois  Consultant: Len Patel  Consultant: Shiloh Greenwood  Consultant: Louis Mcdaniel  Consultant: Radha Rayo  Consultant: Mary Pitts  Consultant: Octavio Lara  Consultant: Trever Smith  Consultant: Gema Weir  Consultant: Karli Powers  Covering Team: Parvez Liriano  ED Attending: Gaby Walker  ED Nurse: Bunny Khan  ED Nurse 2: Adrian Guerra  Nurse: Loy Roberts  Ordered: ADM, User  Outpatient Provider: Len Patel  Override: Loy Roberts  Override: Benoit Appiah  PCA/Nursing Assistant: Norma Leroy  Primary Team: Honey Morales  Primary Team: Naty Marshall  Primary Team: Gerry Valera  Primary Team: Kristi Noel  Primary Team: Marcella Fox  Primary Team: Faizan Castillo  Primary Team: Radha Navas  Primary Team: Chano Bryson  Primary Team: Benji Dickinson  Primary Team: Jaime Diamond  Primary Team: Madelyn Rios  Registered Dietitian: Bina Daniels  Registered Dietitian: Fanny Avelar  Research: Mary Liriano  Research: Eamon Cruz  Research: Lexa Burgess  Respiratory Therapy: Lashanda Nava  Respiratory Therapy: Jose, Julian  Respiratory Therapy: Anupam Azevedo  : Tamela Barraza  Student: Daljit Taylor  Student: Pop Chin  Student: Jae Prince  Team: [facility], Team  Team: [facility], Team  UR// Supp. Assoc.: Seamus Mejía

## 2024-03-07 NOTE — PROGRESS NOTE ADULT - ASSESSMENT
22 yo male with no significant pmh presents for fever, chills, nausea for >3 days admitted for sepsis and jasmin.

## 2024-03-07 NOTE — PROGRESS NOTE ADULT - SUBJECTIVE AND OBJECTIVE BOX
Patient is a 23y old  Male who presents with a chief complaint of Sepsis and NATALIA (06 Mar 2024 09:19)  Patient seen in follow up for NATALIA.        PAST MEDICAL HISTORY:  No pertinent past medical history      MEDICATIONS  (STANDING):  cefTRIAXone   IVPB 2000 milliGRAM(s) IV Intermittent every 12 hours  doxycycline IVPB 100 milliGRAM(s) IV Intermittent every 12 hours  FIRST- Mouthwash  BLM 10 milliLiter(s) Swish and Spit four times a day  heparin   Injectable 5000 Unit(s) SubCutaneous every 8 hours  lactated ringers. 1000 milliLiter(s) (125 mL/Hr) IV Continuous <Continuous>  lidocaine   4% Patch 2 Patch Transdermal every 24 hours  pantoprazole  Injectable 40 milliGRAM(s) IV Push every 12 hours    MEDICATIONS  (PRN):  acetaminophen     Tablet .. 650 milliGRAM(s) Oral every 6 hours PRN Temp greater or equal to 38C (100.4F)  acetaminophen     Tablet .. 650 milliGRAM(s) Oral every 6 hours PRN Mild Pain (1 - 3)  melatonin 3 milliGRAM(s) Oral at bedtime PRN Insomnia  ondansetron Injectable 4 milliGRAM(s) IV Push every 4 hours PRN Nausea and/or Vomiting  traMADol 25 milliGRAM(s) Oral every 6 hours PRN Severe Pain (7 - 10)    T(C): 36.4 (24 @ 12:02), Max: 38.2 (24 @ 23:42)  HR: 113 (24 @ 12:00) (102 - 159)  BP: 130/82 (24 @ 12:00) (70/40 - 130/82)  RR: 27 (24 @ 12:00)  SpO2: 96% (24 @ 12:00)  Wt(kg): --  I&O's Detail    06 Mar 2024 07:01  -  07 Mar 2024 07:00  --------------------------------------------------------  IN:    dextrose 5% + sodium chloride 0.45% w/ Additives: 2100 mL    IV PiggyBack: 50 mL    IV PiggyBack: 50 mL    IV PiggyBack: 50 mL    IV PiggyBack: 200 mL  Total IN: 2450 mL    OUT:    Voided (mL): 5000 mL  Total OUT: 5000 mL    Total NET: -2550 mL      07 Mar 2024 07:01  -  07 Mar 2024 12:40  --------------------------------------------------------  IN:    dextrose 5% + sodium chloride 0.45% w/ Additives: 200 mL    IV PiggyBack: 50 mL    IV PiggyBack: 250 mL    Lactated Ringers: 500 mL  Total IN: 1000 mL    OUT:    Phenylephrine: 0 mL    Voided (mL): 1700 mL  Total OUT: 1700 mL    Total NET: -700 mL              PHYSICAL EXAM:  General: No distress  Respiratory: b/l air entry  Cardiovascular: S1 S2  Gastrointestinal: soft  Extremities:  no edema                       LABORATORY:                        11.7   29.51 )-----------( 100      ( 07 Mar 2024 04:37 )             32.6     -07    135  |  103  |  57<H>  ----------------------------<  117<H>  3.9   |  24  |  4.10<H>    Ca    7.7<L>      07 Mar 2024 04:37  Phos  3.1       Mg     2.1         TPro  5.4<L>  /  Alb  1.8<L>  /  TBili  1.7<H>  /  DBili  x   /  AST  29  /  ALT  49  /  AlkPhos  79  03    Sodium: 135 mmol/L ( @ 04:37)  Sodium: 131 mmol/L ( @ 17:30)  Sodium: 133 mmol/L ( @ 04:30)  Sodium: 134 mmol/L ( @ 12:42)    Potassium: 3.9 mmol/L ( @ 04:37)  Potassium: 4.3 mmol/L ( @ 17:30)  Potassium: 4.6 mmol/L ( @ 04:30)  Potassium: 4.3 mmol/L ( @ 12:42)    Hemoglobin: 11.7 g/dL ( @ 04:37)  Hemoglobin: 12.6 g/dL ( @ 17:30)  Hemoglobin: 12.9 g/dL ( @ 04:30)  Hemoglobin: 14.6 g/dL ( @ 12:42)    Creatinine, Serum 4.10 ( @ 04:37)  Creatinine, Serum 4.80 ( @ 17:30)  Creatinine, Serum 4.80 ( @ 04:30)  Creatinine, Serum 4.80 ( @ 12:42)    CARDIAC MARKERS ( 05 Mar 2024 12:42 )  x     / x     / 235 U/L / x     / x          LIVER FUNCTIONS - ( 07 Mar 2024 04:37 )  Alb: 1.8 g/dL / Pro: 5.4 g/dL / ALK PHOS: 79 U/L / ALT: 49 U/L / AST: 29 U/L / GGT: x           Urinalysis Basic - ( 07 Mar 2024 11:30 )    Color: Yellow / Appearance: Clear / S.007 / pH: x  Gluc: x / Ketone: Negative mg/dL  / Bili: Negative / Urobili: 0.2 mg/dL   Blood: x / Protein: Trace mg/dL / Nitrite: Negative   Leuk Esterase: Negative / RBC: x / WBC x   Sq Epi: x / Non Sq Epi: x / Bacteria: x      ABG - ( 07 Mar 2024 06:08 )  pH, Arterial: 7.42  pH, Blood: x     /  pCO2: 34    /  pO2: 77    / HCO3: 22    / Base Excess: -2.4  /  SaO2: 96.8

## 2024-03-07 NOTE — CONSULT NOTE ADULT - ATTENDING COMMENTS
23 year old male with no medical history presents with fevers and nausea associated with rash and axillary lymphadenopathy. Had recent abscess from left axilla drained on Monday (3/4). Labs here demonstrate leukocytosis with bandemia, metabolic acidosis, and acute renal failure. Was admitted to ICU for further management of distributive shock state requiring vasopressor support.     - Continued tachycardia HR in 100s likely 2/2 to ongoing infectious process and distributive shock.   - EKG shows sinus tachycardia 135bpm. No acute changes on EKG compared to previous.  - No meaningful evidence of volume overload.  - TTE 3/6/24 with normal LV systolic function with LVEF 70-75%. Tachycardic in the range of 130-135 bpm throughout the examination.   - BP soft systolic in 100s, monitor routine hemodynamics.   - Off pressors and midodrine now.  Phenylephrine weaned just before our exam.  Hoping he will stay off pressors  - BCx NGTD, Abx per ID

## 2024-03-07 NOTE — PROGRESS NOTE ADULT - SUBJECTIVE AND OBJECTIVE BOX
Patient is a 23y old  Male who presents with a chief complaint of Sepsis and NATALIA (07 Mar 2024 13:10)      INTERVAL HPI/OVERNIGHT EVENTS: Patient seen and examined at bedside. Mother at bedside reporting rash appears to be getting worse   voiding well     MEDICATIONS  (STANDING):  cefTRIAXone   IVPB 2000 milliGRAM(s) IV Intermittent every 12 hours  doxycycline IVPB 100 milliGRAM(s) IV Intermittent every 12 hours  FIRST- Mouthwash  BLM 10 milliLiter(s) Swish and Spit four times a day  heparin   Injectable 5000 Unit(s) SubCutaneous every 8 hours  lactated ringers. 1000 milliLiter(s) (125 mL/Hr) IV Continuous <Continuous>  lidocaine   4% Patch 2 Patch Transdermal every 24 hours  multivitamin/minerals 1 Tablet(s) Oral daily  pantoprazole  Injectable 40 milliGRAM(s) IV Push every 12 hours    MEDICATIONS  (PRN):  acetaminophen     Tablet .. 650 milliGRAM(s) Oral every 6 hours PRN Temp greater or equal to 38C (100.4F)  acetaminophen     Tablet .. 650 milliGRAM(s) Oral every 6 hours PRN Mild Pain (1 - 3)  melatonin 3 milliGRAM(s) Oral at bedtime PRN Insomnia  ondansetron Injectable 4 milliGRAM(s) IV Push every 4 hours PRN Nausea and/or Vomiting  traMADol 25 milliGRAM(s) Oral every 6 hours PRN Severe Pain (7 - 10)      Allergies    No Known Allergies    Intolerances    Vital Signs Last 24 Hrs  T(C): 36.8 (07 Mar 2024 20:14), Max: 38.2 (06 Mar 2024 23:42)  T(F): 98.2 (07 Mar 2024 20:14), Max: 100.7 (06 Mar 2024 23:42)  HR: 107 (07 Mar 2024 19:00) (102 - 133)  BP: 123/78 (07 Mar 2024 19:00) (95/47 - 130/82)  BP(mean): 86 (07 Mar 2024 18:00) (63 - 102)  RR: 26 (07 Mar 2024 19:00) (23 - 48)  SpO2: 99% (07 Mar 2024 19:00) (91% - 99%)    Parameters below as of 06 Mar 2024 23:00  Patient On (Oxygen Delivery Method): nasal cannula  O2 Flow (L/min): 2    I&O's Summary    06 Mar 2024 07:  -  07 Mar 2024 07:00  --------------------------------------------------------  IN: 2450 mL / OUT: 5000 mL / NET: -2550 mL    07 Mar 2024 07:01  -  07 Mar 2024 20:23  --------------------------------------------------------  IN: 1750 mL / OUT: 1700 mL / NET: 50 mL      BMI (kg/m2): 32.5 (24 @ 01:10)    PHYSICAL EXAM:  GENERAL: NAD  HEENT:  AT/NC, anicteric, moist mucous membranes, EOMI, PERRL, no lid-lag, conjunctiva and sclera clear  CHEST/LUNG:  CTA b/l, no rales, wheezes, or rhonchi,  normal respiratory effort, no intercostal retractions  HEART:  RRR, S1, S2, no murmurs; no pitting edema  ABDOMEN:  BS+, soft, nontender, nondistended  MSK/EXTREMITIES: palpable peripheral pulses, no clubbing or cyanosis; diffuse rash on extremities   NERVOUS SYSTEM: answers questions and follows commands appropriately, A&Ox3 grossly moves all extremities   PSYCH: Appropriate affect, Alert & Awake; Good judgement            LABS: Personally reviewed  CBC                        11.7   29.51 )-----------( 100      ( 07 Mar 2024 04:37 )             32.6     CMP      135  |  103  |  57  ----------------------------<  117  3.9   |  24  |  4.10    Ca    7.7      07 Mar 2024 04:37  Phos  3.1       Mg     2.1         TPro  5.4  /  Alb  1.8  /  TBili  1.7  /  DBili  x   /  AST  29  /  ALT  49  /  AlkPhos  79        Lipase: 53 U/L (24 @ 12:42)      PT/INR - ( 06 Mar 2024 09:38 )   PT: 16.1 sec;   INR: 1.39 ratio         PTT - ( 06 Mar 2024 09:38 )  PTT:43.0 sec  Lactate, Blood: 1.9 mmol/L ( @ 04:37)  Lactate, Blood: 4.2 mmol/L ( @ 04:30)  Lactate, Blood: 4.5 mmol/L ( @ 00:46)  Lactate, Blood: 5.8 mmol/L ( @ 20:14)  Lactate, Blood: 8.6 mmol/L ( @ 12:42)      CARDIAC MARKERS ( 05 Mar 2024 12:42 )  x     / x     / 235 U/L / x     / x          - Chol -- LDL -- HDL -- Trig 474 mg/dL      ABG - ( 07 Mar 2024 06:08 )  pH, Arterial: 7.42  pH, Blood: x     /  pCO2: 34    /  pO2: 77    / HCO3: 22    / Base Excess: -2.4  /  SaO2: 96.8                        Urinalysis Basic - ( 07 Mar 2024 11:30 )    Color: Yellow / Appearance: Clear / S.007 / pH: x  Gluc: x / Ketone: Negative mg/dL  / Bili: Negative / Urobili: 0.2 mg/dL   Blood: x / Protein: Trace mg/dL / Nitrite: Negative   Leuk Esterase: Negative / RBC: 0 /HPF / WBC 0 /HPF   Sq Epi: x / Non Sq Epi: x / Bacteria: x        Culture - Urine (collected 06 Mar 2024 05:00)  Source: Clean Catch Clean Catch (Midstream)  Final Report (07 Mar 2024 09:38):    No growth    Culture - Blood (collected 05 Mar 2024 14:45)  Source: .Blood Blood  Preliminary Report (06 Mar 2024 23:02):    No growth at 24 hours    Culture - Blood (collected 05 Mar 2024 14:33)  Source: .Blood Blood  Preliminary Report (06 Mar 2024 23:02):    No growth at 24 hours            Culture - Urine (collected 24 @ 05:00)  Source: Clean Catch Clean Catch (Midstream)  Final Report (24 @ 09:38):    No growth    Culture - Blood (collected 03-05-24 @ 14:45)  Source: .Blood Blood  Preliminary Report (24 @ 23:02):    No growth at 24 hours    Culture - Blood (collected 24 @ 14:33)  Source: .Blood Blood  Preliminary Report (24 @ 23:02):    No growth at 24 hours        RADIOLOGY & ADDITIONAL TESTS: Personally reviewed.     Consultant(s) Notes Reviewed:  [x] YES  [ ] NO   Discussed with SW/LISS, RN

## 2024-03-07 NOTE — PROGRESS NOTE ADULT - SUBJECTIVE AND OBJECTIVE BOX
Patient is a 23y old  Male who presents with a chief complaint of Sepsis and NATALIA (07 Mar 2024 09:50)    24 hour events: ***    REVIEW OF SYSTEMS  Constitutional: No fever, chills, fatigue  Neuro: No headache, numbness, weakness  Resp: No cough, wheezing, shortness of breath  CVS: No chest pain, palpitations, leg swelling  GI: No abdominal pain, nausea, vomiting, diarrhea   : No dysuria, frequency, incontinence  Skin: No itching, burning, rashes, or lesions   Msk: No joint pain or swelling  Psych: No depression, anxiety, mood swings  Heme: No bleeding    T(F): 97.5 (03-07-24 @ 08:02), Max: 100.7 (03-06-24 @ 23:42)  HR: 110 (03-07-24 @ 10:00) (102 - 148)  BP: 124/63 (03-07-24 @ 10:00) (88/46 - 125/59)  RR: 38 (03-07-24 @ 10:00) (19 - 48)  SpO2: 95% (03-07-24 @ 10:00) (91% - 97%)  Wt(kg): --            I&O's Summary    03-06 @ 07:01  -  03-07 @ 07:00  --------------------------------------------------------  IN: 2450 mL / OUT: 5000 mL / NET: -2550 mL    03-07 @ 07:01  -  03-07 @ 10:23  --------------------------------------------------------  IN: 750 mL / OUT: 800 mL / NET: -50 mL      PHYSICAL EXAM  General:   CNS:   HEENT:   Resp:   CVS:   Abd:   Ext:   Skin:     MEDICATIONS  cefTRIAXone   IVPB IV Intermittent  doxycycline IVPB IV Intermittent          acetaminophen     Tablet .. Oral PRN  acetaminophen     Tablet .. Oral PRN  melatonin Oral PRN  ondansetron Injectable IV Push PRN  traMADol Oral PRN      heparin   Injectable SubCutaneous    pantoprazole  Injectable IV Push      lactated ringers. IV Continuous      FIRST- Mouthwash  BLM Swish and Spit  lidocaine   4% Patch Transdermal                            11.7   29.51 )-----------( 100      ( 07 Mar 2024 04:37 )             32.6       03-07    135  |  103  |  57<H>  ----------------------------<  117<H>  3.9   |  24  |  4.10<H>    Ca    7.7<L>      07 Mar 2024 04:37  Phos  3.1     03-07  Mg     2.1     03-07    TPro  5.4<L>  /  Alb  1.8<L>  /  TBili  1.7<H>  /  DBili  x   /  AST  29  /  ALT  49  /  AlkPhos  79  03-07    Lactate 1.9           03-07 @ 04:37      CARDIAC MARKERS ( 05 Mar 2024 12:42 )  x     / x     / 235 U/L / x     / x          PT/INR - ( 06 Mar 2024 09:38 )   PT: 16.1 sec;   INR: 1.39 ratio         PTT - ( 06 Mar 2024 09:38 )  PTT:43.0 sec  Urinalysis Basic - ( 07 Mar 2024 04:37 )    Color: x / Appearance: x / SG: x / pH: x  Gluc: 117 mg/dL / Ketone: x  / Bili: x / Urobili: x   Blood: x / Protein: x / Nitrite: x   Leuk Esterase: x / RBC: x / WBC x   Sq Epi: x / Non Sq Epi: x / Bacteria: x      Clean Catch Clean Catch (Midstream)   No growth -- 03-06 @ 05:00  .Blood Blood   No growth at 24 hours -- 03-05 @ 14:45  .Blood Blood   No growth at 24 hours -- 03-05 @ 14:33        Radiology: ***  Bedside lung ultrasound: ***  Bedside ECHO: ***    CENTRAL LINE: Y/N          DATE INSERTED:              REMOVE: Y/N  ALVARADO: Y/N                        DATE INSERTED:              REMOVE: Y/N  A-LINE: Y/N                       DATE INSERTED:              REMOVE: Y/N    GLOBAL ISSUE/BEST PRACTICE  Analgesia:   Sedation:   CAM-ICU:   HOB elevation: yes  Stress ulcer prophylaxis:   VTE prophylaxis:   Glycemic control:   Nutrition:     CODE STATUS: ***  [facility] discussion: Y       Patient is a 23y old  Male who presents with a chief complaint of Sepsis and NATALIA (07 Mar 2024 09:50)    24 hour events: ***    REVIEW OF SYSTEMS  Constitutional: No fever, chills, fatigue  Neuro: No headache, numbness, weakness  Resp: No cough, wheezing, shortness of breath  CVS: No chest pain, palpitations, leg swelling  GI: No abdominal pain, nausea, vomiting, diarrhea   : No dysuria, frequency, incontinence  Skin: No itching, burning, rashes, or lesions   Msk: No joint pain or swelling  Psych: No depression, anxiety, mood swings  Heme: No bleeding    T(F): 97.5 (03-07-24 @ 08:02), Max: 100.7 (03-06-24 @ 23:42)  HR: 110 (03-07-24 @ 10:00) (102 - 148)  BP: 124/63 (03-07-24 @ 10:00) (88/46 - 125/59)  RR: 38 (03-07-24 @ 10:00) (19 - 48)  SpO2: 95% (03-07-24 @ 10:00) (91% - 97%)  Wt(kg): --            I&O's Summary    03-06 @ 07:01  -  03-07 @ 07:00  --------------------------------------------------------  IN: 2450 mL / OUT: 5000 mL / NET: -2550 mL    03-07 @ 07:01  -  03-07 @ 10:23  --------------------------------------------------------  IN: 750 mL / OUT: 800 mL / NET: -50 mL      PHYSICAL EXAM  General: NAD  CNS: A&Ox3  HEENT: NC/AT. No neck stiffness.  Resp: CTAB. No wheezing.  CVS: Tachy. Reg rate. No murmur. No edema.  Abd: Soft, nontender.  Ext: Warm  Skin: Erythematous rash on torso and LE.    MEDICATIONS  cefTRIAXone   IVPB IV Intermittent  doxycycline IVPB IV Intermittent          acetaminophen     Tablet .. Oral PRN  acetaminophen     Tablet .. Oral PRN  melatonin Oral PRN  ondansetron Injectable IV Push PRN  traMADol Oral PRN      heparin   Injectable SubCutaneous    pantoprazole  Injectable IV Push      lactated ringers. IV Continuous      FIRST- Mouthwash  BLM Swish and Spit  lidocaine   4% Patch Transdermal                            11.7   29.51 )-----------( 100      ( 07 Mar 2024 04:37 )             32.6       03-07    135  |  103  |  57<H>  ----------------------------<  117<H>  3.9   |  24  |  4.10<H>    Ca    7.7<L>      07 Mar 2024 04:37  Phos  3.1     03-07  Mg     2.1     03-07    TPro  5.4<L>  /  Alb  1.8<L>  /  TBili  1.7<H>  /  DBili  x   /  AST  29  /  ALT  49  /  AlkPhos  79  03-07    Lactate 1.9           03-07 @ 04:37      CARDIAC MARKERS ( 05 Mar 2024 12:42 )  x     / x     / 235 U/L / x     / x          PT/INR - ( 06 Mar 2024 09:38 )   PT: 16.1 sec;   INR: 1.39 ratio         PTT - ( 06 Mar 2024 09:38 )  PTT:43.0 sec  Urinalysis Basic - ( 07 Mar 2024 04:37 )    Color: x / Appearance: x / SG: x / pH: x  Gluc: 117 mg/dL / Ketone: x  / Bili: x / Urobili: x   Blood: x / Protein: x / Nitrite: x   Leuk Esterase: x / RBC: x / WBC x   Sq Epi: x / Non Sq Epi: x / Bacteria: x      Clean Catch Clean Catch (Midstream)   No growth -- 03-06 @ 05:00  .Blood Blood   No growth at 24 hours -- 03-05 @ 14:45  .Blood Blood   No growth at 24 hours -- 03-05 @ 14:33        Radiology: ***  Bedside lung ultrasound: ***  Bedside ECHO: ***    CENTRAL LINE: Y/N          DATE INSERTED:              REMOVE: Y/N  ALVARADO: Y/N                        DATE INSERTED:              REMOVE: Y/N  A-LINE: Y/N                       DATE INSERTED:              REMOVE: Y/N    GLOBAL ISSUE/BEST PRACTICE  Analgesia:   Sedation:   CAM-ICU:   HOB elevation: yes  Stress ulcer prophylaxis:   VTE prophylaxis:   Glycemic control:   Nutrition:     CODE STATUS: ***  [facility] discussion: Y       Patient is a 23y old  Male who presents with a chief complaint of Sepsis and NATALIA (07 Mar 2024 09:50)    24 hour events: Pt states he is feeling slightly better compared to yesterday. Endorses nausea. Also endorses mouth pain when eating. Overnight, pt was noted to have alternating mentation per mother and overnight PA. Briefly placed on 2L NC and later taken off. Pt states he was likely awoken during a dream and does not feel confused or altered.     REVIEW OF SYSTEMS  Constitutional: No fever, chills, fatigue  HEENT: +mouth pain  Neuro: No headache, numbness, weakness  Resp: No cough, wheezing, shortness of breath  CVS: No chest pain, palpitations, leg swelling  GI: +nausea, no abdominal pain, vomiting, diarrhea   : No dysuria, frequency, incontinence  Skin: +rash throughout body, No itching, burning, rashes, or lesions   Msk: No joint pain or swelling  Psych: No depression, anxiety, mood swings  Heme: No bleeding    T(F): 97.5 (03-07-24 @ 08:02), Max: 100.7 (03-06-24 @ 23:42)  HR: 110 (03-07-24 @ 10:00) (102 - 148)  BP: 124/63 (03-07-24 @ 10:00) (88/46 - 125/59)  RR: 38 (03-07-24 @ 10:00) (19 - 48)  SpO2: 95% (03-07-24 @ 10:00) (91% - 97%)  Wt(kg): --            I&O's Summary    03-06 @ 07:01  -  03-07 @ 07:00  --------------------------------------------------------  IN: 2450 mL / OUT: 5000 mL / NET: -2550 mL    03-07 @ 07:01  -  03-07 @ 10:23  --------------------------------------------------------  IN: 750 mL / OUT: 800 mL / NET: -50 mL      PHYSICAL EXAM  General: NAD  CNS: A&Ox3  HEENT: NC/AT. No neck stiffness.  Resp: CTAB. No wheezing.  CVS: Tachy. Reg rate. No murmur. No edema.  Abd: Soft, nontender  Ext: Warm  Skin: Erythematous rash on torso and LE; confluent [id number] macular lesion on dorsum of L foot and posterolateral lower calf of R foot     MEDICATIONS  cefTRIAXone   IVPB IV Intermittent  doxycycline IVPB IV Intermittent          acetaminophen     Tablet .. Oral PRN  acetaminophen     Tablet .. Oral PRN  melatonin Oral PRN  ondansetron Injectable IV Push PRN  traMADol Oral PRN      heparin   Injectable SubCutaneous    pantoprazole  Injectable IV Push      lactated ringers. IV Continuous      FIRST- Mouthwash  BLM Swish and Spit  lidocaine   4% Patch Transdermal                            11.7   29.51 )-----------( 100      ( 07 Mar 2024 04:37 )             32.6       03-07    135  |  103  |  57<H>  ----------------------------<  117<H>  3.9   |  24  |  4.10<H>    Ca    7.7<L>      07 Mar 2024 04:37  Phos  3.1     03-07  Mg     2.1     03-07    TPro  5.4<L>  /  Alb  1.8<L>  /  TBili  1.7<H>  /  DBili  x   /  AST  29  /  ALT  49  /  AlkPhos  79  03-07    Lactate 1.9           03-07 @ 04:37      CARDIAC MARKERS ( 05 Mar 2024 12:42 )  x     / x     / 235 U/L / x     / x          PT/INR - ( 06 Mar 2024 09:38 )   PT: 16.1 sec;   INR: 1.39 ratio         PTT - ( 06 Mar 2024 09:38 )  PTT:43.0 sec  Urinalysis Basic - ( 07 Mar 2024 04:37 )    Color: x / Appearance: x / SG: x / pH: x  Gluc: 117 mg/dL / Ketone: x  / Bili: x / Urobili: x   Blood: x / Protein: x / Nitrite: x   Leuk Esterase: x / RBC: x / WBC x   Sq Epi: x / Non Sq Epi: x / Bacteria: x      Clean Catch Clean Catch (Midstream)   No growth -- 03-06 @ 05:00  .Blood Blood   No growth at 24 hours -- 03-05 @ 14:45  .Blood Blood   No growth at 24 hours -- 03-05 @ 14:33      Tubes/lines: none    GLOBAL ISSUE/BEST PRACTICE  Analgesia: Y  Sedation: N  HOB elevation: yes  Stress ulcer prophylaxis: N  VTE prophylaxis: Y  Glycemic control: N  Nutrition: Y

## 2024-03-07 NOTE — PROGRESS NOTE ADULT - ATTENDING COMMENTS
23 year old male with no medical history presents with fevers and nausea associated with rash and axillary lymphadenopathy. Labs demonstrate leukocytosis with bandemia, acute renal failure, suspect ATN secondary to septic shock, and multiple electrolyte abnormalities. There is an 8 mm appendix and possible appendicolith. Low suspicion for acute appendicitis as per surgery. Earlier required vasopressor support which has been discontinued.    NEURO: Mentation at baseline  CVS: Received IVF resuscitation and briefly on vasopressors. Midodrine discontinued.  PULM: Saturating well. Respiratory status stable  GI: Tolerating diet. GI consult for mural thickening of esophagus noted on CT.  RENAL: Start LR @ 125 mL/hr. Stop bicarbondate infusion. Follow up with nephrology. Supplemental electrolytes.  ID: Cefepime changed to ceftriaxone. Continue doxycycline. Stop vancomycin. Follow up cultures. Unsuccessful attempt at LP made this morning. Aborted after two attempts to obtain fluid. Patient is feeling better. Has no headache, neck stiffness, or mental status changes. Discussed with ID. Will hold off on pursuing repeat LP at this time.  PPX: HSQ for DVT PPX  Full Code  Patient and parents at bedside. 23 year old male with no medical history presents with fevers and nausea associated with rash and axillary lymphadenopathy. Labs demonstrate leukocytosis with bandemia, acute renal failure, suspect ATN secondary to septic shock, and multiple electrolyte abnormalities. There is an 8 mm appendix and possible appendicolith. Low suspicion for acute appendicitis as per surgery. Earlier required vasopressor support which has been discontinued.    NEURO: Mentation at baseline  CVS: Received IVF resuscitation and briefly on vasopressors. Midodrine discontinued.  PULM: Saturating well. Respiratory status stable  GI: Tolerating diet. GI consult for mural thickening of esophagus noted on CT.  RENAL: Start LR @ 125 mL/hr. Stop bicarbonate infusion. Follow up with nephrology. Supplemental electrolytes.  ID: Cefepime changed to ceftriaxone. Continue doxycycline. Stop vancomycin. Follow up cultures. Unsuccessful attempt at LP made this morning. Aborted after two attempts to obtain fluid. Patient is feeling better. Has no headache, neck stiffness, or mental status changes. Discussed with ID. Will hold off on pursuing repeat LP at this time.  PPX: HSQ for DVT PPX  Full Code  Patient and parents at bedside.

## 2024-03-07 NOTE — PROGRESS NOTE ADULT - ASSESSMENT
NATALIA: Prerenal azotemia, Septic ATN, NSAID nephropathy  Shock, Sepsis  Metabolic acidosis  Recent axillary abscess, s/p Drainage    03/06/24: Continue IV hydration. Add bicarb. Pressor support as needed. IV abx, ID follow up. No improvement in renal indices. Monitor vanco levels.   Avoid nephrotoxic meds as possible. Avoid ACEI, ARB, NSAIDs and IV contrast. Will follow electrolytes and renal function trend. ICU management.   03/07/24: Improving renal indices. Good UO. Check serologies. Work up for rash. D/w pt's mother at bedside. Avoid nephrotoxic meds as possible. Continue IV hydration.  NATALIA: Prerenal azotemia, Septic ATN, NSAID nephropathy  Shock, Sepsis  Metabolic acidosis  Recent axillary abscess, s/p Drainage    03/06/24: Continue IV hydration. Add bicarb. Pressor support as needed. IV abx, ID follow up. No improvement in renal indices. Monitor vanco levels.   Avoid nephrotoxic meds as possible. Avoid ACEI, ARB, NSAIDs and IV contrast. Will follow electrolytes and renal function trend. ICU management.   03/07/24: Improving renal indices. Good UO. Check serologies. Work up for rash. D/w pt's mother at bedside. Avoid nephrotoxic meds as possible. Continue IV hydration.   Recheck UA, Spot urine for protein/crea. ICU management.

## 2024-03-07 NOTE — DIETITIAN INITIAL EVALUATION ADULT - NUTRITIONGOAL OUTCOME1
Pt to consume 75% meals/supplements daily with emphasis on HBV protein sources to meet est energy needs.

## 2024-03-07 NOTE — PROGRESS NOTE ADULT - ASSESSMENT
24 yo male with hx of skin infections but no other significant pmh, presents for fever, chills, nausea for x 3 days. Found to have sepsis of unclear etiology, as well as NATALIA. Has an axillary abscess but otherwise no other obvious localizing signs/symptoms of infection. Per Surgery low suspicion for appendicitis.     Had a fever overnight but reports feeling better today. WBC increased 29, could be related to IV steroids but will monitor. Blood cultures currently no growth. Urine culture unrevealing, but collected after receiving antibiotics. HIV negative. TTE showed no vegetation.    #Severe Sepsis  #Bandemia  #Leukocytosis  #L axialary abscess  #NATALIA    -continue ceftriaxone  -continue doxycyline 100mg IV q12h  -hold off on vancomycin  -follow blood and urine cultures  -follow up francisella tularensis serology  -if with loose stools send for GI PCR  -monitor WBC  -discussed with mother at bedside  -discussed with Dr. Samantha Pitts MD  Division of Infectious Diseases   Cell [phone] between 8am and 6pm   After 6pm and weekends please call ID service at [phone].     35 minutes spent on total encounter assessing patient, examination, chart review, counseling and coordinating care by the attending physician/nurse/care manager.

## 2024-03-07 NOTE — CONSULT NOTE ADULT - SUBJECTIVE AND OBJECTIVE BOX
[facility]         Issa Sharif, Shawna, Babatunde, Priscila, Noah Og        [phone] (office)    HPI:  24 yo male with no significant pmh presents for fever, chills, nausea for >3 days.    Patient states that since Saturday, he has been having significant rigors, fevers, and has been feeling unwell. He had a left axiliary node drainage a few days ago by urgent care, after noticing that he had a skin infection with purulent drainage located near his left axilla. He was started on doxycyline outpatient for the skin infection, but has had persistent fevers. His last temperature was 102.6F and he alternated between Tylenol and ibuprofen but was not able to get his temperature down. He has a history of multiple skin infections and has tolerated doxycyline in the past. Patient also endorses a generalized skin rash since Saturday, nausea with 2 episodes of NBNB emesis, and loose stool. He also endorses generalized abdominal pain. Patient reports that he is feeling better today. Denies any prior cardiac history and does not follow with cardiologist outpatient. Denies headache, lightheadedness, dizziness, chest pain, palpitations, SOB, nausea, vomiting, edema.         PAST MEDICAL & SURGICAL HISTORY:  No pertinent past medical history      No significant past surgical history        SOCIAL HISTORY: No active tobacco, alcohol or illicit drug use    FAMILY HISTORY:  FH: type 2 diabetes (Father)    FH: hyperlipidemia (Father, Mother)      Home Medications:      MEDICATIONS  (STANDING):  cefTRIAXone   IVPB 2000 milliGRAM(s) IV Intermittent every 12 hours  doxycycline IVPB 100 milliGRAM(s) IV Intermittent every 12 hours  FIRST- Mouthwash  BLM 10 milliLiter(s) Swish and Spit four times a day  heparin   Injectable 5000 Unit(s) SubCutaneous every 8 hours  lactated ringers. 1000 milliLiter(s) (125 mL/Hr) IV Continuous <Continuous>  lidocaine   4% Patch 2 Patch Transdermal every 24 hours  pantoprazole  Injectable 40 milliGRAM(s) IV Push every 12 hours    MEDICATIONS  (PRN):  acetaminophen     Tablet .. 650 milliGRAM(s) Oral every 6 hours PRN Temp greater or equal to 38C (100.4F)  acetaminophen     Tablet .. 650 milliGRAM(s) Oral every 6 hours PRN Mild Pain (1 - 3)  melatonin 3 milliGRAM(s) Oral at bedtime PRN Insomnia  ondansetron Injectable 4 milliGRAM(s) IV Push every 4 hours PRN Nausea and/or Vomiting  traMADol 25 milliGRAM(s) Oral every 6 hours PRN Severe Pain (7 - 10)      Allergies    No Known Allergies    Intolerances        REVIEW OF SYSTEMS: Negative except as per HPI.    VITAL SIGNS:   Vital Signs Last 24 Hrs  T(C): 36.4 (07 Mar 2024 08:02), Max: 38.2 (06 Mar 2024 23:42)  T(F): 97.5 (07 Mar 2024 08:02), Max: 100.7 (06 Mar 2024 23:42)  HR: 114 (07 Mar 2024 09:00) (102 - 148)  BP: 113/59 (07 Mar 2024 09:00) (88/46 - 125/59)  BP(mean): 82 (07 Mar 2024 08:02) (63 - 84)  RR: 23 (07 Mar 2024 09:00) (19 - 48)  SpO2: 94% (07 Mar 2024 09:00) (91% - 97%)    Parameters below as of 06 Mar 2024 23:00  Patient On (Oxygen Delivery Method): nasal cannula  O2 Flow (L/min): 2      I&O's Summary    06 Mar 2024 07:01  -  07 Mar 2024 07:00  --------------------------------------------------------  IN: 2450 mL / OUT: 5000 mL / NET: -2550 mL    07 Mar 2024 07:01  -  07 Mar 2024 09:51  --------------------------------------------------------  IN: 625 mL / OUT: 800 mL / NET: -175 mL        PHYSICAL EXAM:  Constitutional: NAD, well-developed  HEENT NC/AT, moist mucous membranes  Pulmonary: Non-labored, breath sounds are clear bilaterally, no wheezing, rales or rhonchi  Cardiovascular: +S1, S2, RRR, no murmur  Gastrointestinal: Soft, nontender, nondistended, normoactive bowel sounds  Extremities: No peripheral edema   Neurological: Alert, strength and sensitivity are grossly intact  Skin: No obvious lesions/rashes  Psych: Mood & affect appropriate    LABS: All Labs Reviewed:                        11.7   29.51 )-----------( 100      ( 07 Mar 2024 04:37 )             32.6                         12.6   21.76 )-----------( 120      ( 06 Mar 2024 17:30 )             35.6                         12.9   18.07 )-----------( 145      ( 06 Mar 2024 04:30 )             36.4     07 Mar 2024 04:37    135    |  103    |  57     ----------------------------<  117    3.9     |  24     |  4.10   06 Mar 2024 17:30    131    |  100    |  55     ----------------------------<  132    4.3     |  20     |  4.80   06 Mar 2024 04:30    133    |  103    |  41     ----------------------------<  89     4.6     |  19     |  4.80     Ca    7.7        07 Mar 2024 04:37  Ca    7.3        06 Mar 2024 17:30  Ca    7.4        06 Mar 2024 04:30  Phos  3.1       07 Mar 2024 04:37  Phos  2.7       06 Mar 2024 17:30  Phos  2.3       06 Mar 2024 04:30  Mg     2.1       07 Mar 2024 04:37  Mg     1.9       06 Mar 2024 17:30  Mg     1.0       06 Mar 2024 04:30    TPro  5.4    /  Alb  1.8    /  TBili  1.7    /  DBili  x      /  AST  29     /  ALT  49     /  AlkPhos  79     07 Mar 2024 04:37  TPro  5.5    /  Alb  2.0    /  TBili  3.1    /  DBili  x      /  AST  37     /  ALT  55     /  AlkPhos  68     06 Mar 2024 04:30  TPro  6.7    /  Alb  2.6    /  TBili  2.7    /  DBili  x      /  AST  42     /  ALT  59     /  AlkPhos  84     05 Mar 2024 12:42    PT/INR - ( 06 Mar 2024 09:38 )   PT: 16.1 sec;   INR: 1.39 ratio         PTT - ( 06 Mar 2024 09:38 )  PTT:43.0 sec  CARDIAC MARKERS ( 05 Mar 2024 12:42 )  x     / x     / 235 U/L / x     / x          Blood Culture: Organism --  Gram Stain Blood -- Gram Stain --  Specimen Source Clean Catch Clean Catch (Midstream)  Culture-Blood --    Organism --  Gram Stain Blood -- Gram Stain --  Specimen Source .Blood Blood  Culture-Blood --    Organism --  Gram Stain Blood -- Gram Stain --  Specimen Source .Blood Blood  Culture-Blood --

## 2024-03-07 NOTE — DIETITIAN INITIAL EVALUATION ADULT - PERTINENT MEDS FT
MEDICATIONS  (STANDING):  cefTRIAXone   IVPB 2000 milliGRAM(s) IV Intermittent every 12 hours  doxycycline IVPB 100 milliGRAM(s) IV Intermittent every 12 hours  FIRST- Mouthwash  BLM 10 milliLiter(s) Swish and Spit four times a day  heparin   Injectable 5000 Unit(s) SubCutaneous every 8 hours  lactated ringers. 1000 milliLiter(s) (125 mL/Hr) IV Continuous <Continuous>  lidocaine   4% Patch 2 Patch Transdermal every 24 hours  pantoprazole  Injectable 40 milliGRAM(s) IV Push every 12 hours    MEDICATIONS  (PRN):  acetaminophen     Tablet .. 650 milliGRAM(s) Oral every 6 hours PRN Temp greater or equal to 38C (100.4F)  acetaminophen     Tablet .. 650 milliGRAM(s) Oral every 6 hours PRN Mild Pain (1 - 3)  melatonin 3 milliGRAM(s) Oral at bedtime PRN Insomnia  ondansetron Injectable 4 milliGRAM(s) IV Push every 4 hours PRN Nausea and/or Vomiting  traMADol 25 milliGRAM(s) Oral every 6 hours PRN Severe Pain (7 - 10)

## 2024-03-07 NOTE — PROGRESS NOTE ADULT - SUBJECTIVE AND OBJECTIVE BOX
[facility]  INFECTIOUS DISEASES - John Drummond, [address], [place] [address]  Tel: [phone]     Fax: [phone]  =======================================================    GARY DONALDSON [id number]    Follow up: Tmax of 100.7 overnight. Reports feeling better, was able to sit up and stand earlier today. Has some generalized body "soreness", but denies any localizing pain. Felt somewhat short of breath earlier.    Allergies:  No Known Allergies      Antibiotics:  acetaminophen     Tablet .. 650 milliGRAM(s) Oral every 6 hours PRN  acetaminophen     Tablet .. 650 milliGRAM(s) Oral every 6 hours PRN  cefTRIAXone   IVPB 2000 milliGRAM(s) IV Intermittent every 12 hours  doxycycline IVPB 100 milliGRAM(s) IV Intermittent every 12 hours  FIRST- Mouthwash  BLM 10 milliLiter(s) Swish and Spit four times a day  heparin   Injectable 5000 Unit(s) SubCutaneous every 8 hours  lactated ringers. 1000 milliLiter(s) IV Continuous <Continuous>  lidocaine   4% Patch 2 Patch Transdermal every 24 hours  melatonin 3 milliGRAM(s) Oral at bedtime PRN  ondansetron Injectable 4 milliGRAM(s) IV Push every 4 hours PRN  pantoprazole  Injectable 40 milliGRAM(s) IV Push every 12 hours  traMADol 25 milliGRAM(s) Oral every 6 hours PRN       REVIEW OF SYSTEMS:  CONSTITUTIONAL:  (+) fever  HEENT:  No sore throat or runny nose.  CARDIOVASCULAR:  No chest pain or SOB.  RESPIRATORY:  No cough, shortness of breath  GASTROINTESTINAL: No nausea, vomiting or diarrhea  GENITOURINARY:  No dysuria, frequency or urgency  MUSCULOSKELETAL:  no joint swelling  NEUROLOGIC: No headache  PSYCHIATRIC:  No disorder of thought or mood.     Physical Exam:  ICU Vital Signs Last 24 Hrs  T(C): 36.4 (07 Mar 2024 08:02), Max: 38.2 (06 Mar 2024 23:42)  T(F): 97.5 (07 Mar 2024 08:02), Max: 100.7 (06 Mar 2024 23:42)  HR: 113 (07 Mar 2024 12:00) (102 - 148)  BP: 130/82 (07 Mar 2024 12:00) (95/47 - 130/82)  BP(mean): 102 (07 Mar 2024 12:00) (63 - 102)  ABP: --  ABP(mean): --  RR: 27 (07 Mar 2024 12:00) (19 - 48)  SpO2: 96% (07 Mar 2024 12:00) (91% - 97%)    O2 Parameters below as of 06 Mar 2024 23:00  Patient On (Oxygen Delivery Method): nasal cannula  O2 Flow (L/min): 2      GEN: NAD  HEENT: normocephalic and atraumatic.   NECK: Supple.   LUNGS: Normal respiratory effort  HEART: Tachycardic  ABDOMEN: Soft, nontender, and nondistended.    EXTREMITIES: No leg edema.  NEUROLOGIC: AO x 3, no photophobia, no nuchal rigidity  PSYCHIATRIC: Appropriate affect .  SKIN: (+) rash on L foot and leg    Labs:  03-07    135  |  103  |  57<H>  ----------------------------<  117<H>  3.9   |  24  |  4.10<H>    Ca    7.7<L>      07 Mar 2024 04:37  Phos  3.1     03-07  Mg     2.1     03-07    TPro  5.4<L>  /  Alb  1.8<L>  /  TBili  1.7<H>  /  DBili  x   /  AST  29  /  ALT  49  /  AlkPhos  79  03-07                          11.7   29.51 )-----------( 100      ( 07 Mar 2024 04:37 )             32.6     PT/INR - ( 06 Mar 2024 09:38 )   PT: 16.1 sec;   INR: 1.39 ratio         PTT - ( 06 Mar 2024 09:38 )  PTT:43.0 sec  Urinalysis Basic - ( 07 Mar 2024 04:37 )    Color: x / Appearance: x / SG: x / pH: x  Gluc: 117 mg/dL / Ketone: x  / Bili: x / Urobili: x   Blood: x / Protein: x / Nitrite: x   Leuk Esterase: x / RBC: x / WBC x   Sq Epi: x / Non Sq Epi: x / Bacteria: x      LIVER FUNCTIONS - ( 07 Mar 2024 04:37 )  Alb: 1.8 g/dL / Pro: 5.4 g/dL / ALK PHOS: 79 U/L / ALT: 49 U/L / AST: 29 U/L / GGT: x             RECENT CULTURES:  03-06 @ 05:00 Clean Catch Clean Catch (Midstream)     No growth        03-05 @ 14:45 .Blood Blood     No growth at 24 hours        03-05 @ 14:33 .Blood Blood     No growth at 24 hours              All imaging and data are reviewed.

## 2024-03-07 NOTE — PROGRESS NOTE ADULT - PROBLEM SELECTOR PLAN 1
Patient meets sepsis criteria on admission: WBC >12,  >10% bands, HR >90,  likely 2/2 skin infection  -  ct a/p and chest performed  - Eliu following, recs appreciated, Patient is not a candidate for an appendectomy at this time. low suspicion for acute appendicitis   -GI PCR ordered, f/u results  - Continue IVF - adjusted per primary  lactic acidosis resolving   - Trend WBC and monitor for fever - rising WBC  - Tylenol 650mg for fever and mild pain q6  - Cefepime switched to Rocephin. Continue Doxycycline. Stop Vancomycin  - F/u UA, UCx, BCx x2  - ID (Dr. Pitts) consulted recommendations appreciated  - management per ICU    ?septic shock - hypotensive requiring pressors. Monitor blood pressures with Vasopressor support with phenylephrine as needed. started on Midodrine to wean off pressors

## 2024-03-07 NOTE — PROGRESS NOTE ADULT - ASSESSMENT
23 year old male with no medical history presents with fevers and nausea associated with rash and axillary lymphadenopathy. Had recent abscess from left axilla drained on Monday (3/4). Labs here demonstrate leukocytosis with bandemia, metabolic acidosis, and acute renal failure. Was admitted to ICU for further management of distributive shock state requiring vasopressor support.     Neuro: Waxing and waning mental status. As per mother patient appeared more confusion and was not making sense overnight. Upon my examination, patient was alert and oriented x 3. Prior CT head negative. May benefit from LP during admission to r/o meningitis.   Cardio: Sinus tachycardia likely compensatory in setting of persistent fevers. Currently on maintenance fluids to cover insensible losses. No longer requiring vasopressor support. Ordered repeat lactate to assess for clearance.   Resp: Tachypneic overnight, attempted to place on bipap to support work of breathing, however patient did not tolerate mask. Currently on 2L NC with adequate saturation. Ordered CXR and morning ABG  GI: CT abd with appendicolith and esophageal thickening. Low suspicion for acute appendicitis as per surgery team. On PPI BID. May require EGD pending stabilization. GI team aware.   : Adequate urine output despite NATALIA. No hydro on renal sono. Supportive care with IVFs. Nephrology following.   ID: Discontinued cefepime and starting ceftriaxone 2g q12h as per ID recs. Remains on doxy. Unclear source of infection, possible UTI vs recurrent L axilla abscess. Blood cultures negative. Low threshold to perform LP given persistent fevers and inconsistent neuro status.   Endo: Blood glucose stable. Goal 110-180   Heme: SC heparin for dvt ppx. Ordered lower ext doppler given elevated d-dimer to r/o dvts.     CRITICAL CARE TIME SPENT: 40 minutes   Time spent evaluating/treating patient with medical issues that pose a high risk for life threatening deterioration, and/or end-organ damage, reviewing data/labs/imaging, discussing case with multidisciplinary team, discussing plan/goals of care with patient/family. Non-inclusive of procedure time. Date of entry of this note is equal to the date of services rendered.  23 year old male with no medical history presents with fevers and nausea associated with rash and axillary lymphadenopathy. Had recent abscess from left axilla drained on Monday (3/4). Labs here demonstrate leukocytosis with bandemia, metabolic acidosis, and acute renal failure. Was admitted to ICU for further management of distributive shock state requiring vasopressor support.     Neuro: Waxing and waning mental status. As per mother patient appeared more confusion and was not making sense overnight. Upon my examination, patient was alert and oriented x 3. Prior CT head negative. May benefit from LP during admission to r/o meningitis. Tylenol ATC   Cardio: Sinus tachycardia likely compensatory in setting of persistent fevers. Currently on maintenance fluids to cover insensible losses. No longer requiring vasopressor support. Ordered repeat lactate to assess for clearance.   Resp: Tachypneic overnight, attempted to place on bipap to support work of breathing, however patient did not tolerate mask. Currently on 2L NC with adequate saturation. Ordered CXR and morning ABG  GI: CT abd with appendicolith and esophageal thickening. Low suspicion for acute appendicitis as per surgery team. On PPI BID. May require EGD pending stabilization. GI team aware.   : Suspect ATN iso in the setting of septic shock. No hydro on renal sono. Urine output remains adequate. Supportive care with IVFs. Nephrology following.   ID: Discontinued cefepime and starting ceftriaxone 2g q12h as per ID recs. Remains on doxycycline. Unclear source of infection, possible UTI vs recurrent L axilla abscess. Blood cultures negative. Low threshold to perform LP given persistent fevers and inconsistent neuro status.   Endo: Blood glucose stable. Goal 110-180   Heme: SC heparin for dvt ppx. Ordered lower ext doppler given elevated d-dimer to r/o dvts.     CRITICAL CARE TIME SPENT: 40 minutes   Time spent evaluating/treating patient with medical issues that pose a high risk for life threatening deterioration, and/or end-organ damage, reviewing data/labs/imaging, discussing case with multidisciplinary team, discussing plan/goals of care with patient/family. Non-inclusive of procedure time. Date of entry of this note is equal to the date of services rendered.  23 year old male with no medical history presents with fevers and nausea associated with rash and axillary lymphadenopathy. Had recent abscess from left axilla drained on Monday (3/4). Labs here demonstrate leukocytosis with bandemia, metabolic acidosis, and acute renal failure. Was admitted to ICU for further management of distributive shock state requiring vasopressor support.     Neuro: Waxing and waning mental status. As per mother patient appeared more confused and was not making sense overnight. Upon my examination, patient was alert and oriented x 3. Prior CT head negative. May benefit from LP during admission to r/o meningitis. Tylenol ATC   Cardio: Sinus tachycardia likely compensatory in setting of persistent fevers. Currently on maintenance fluids to cover insensible losses. No longer requiring vasopressor support. Ordered repeat lactate to assess for clearance.   Resp: Tachypneic overnight, attempted to place on bipap to support work of breathing, however patient did not tolerate mask. Currently on 2L NC with adequate saturation. Ordered CXR and morning ABG  GI: CT abd with appendicolith and esophageal thickening. Low suspicion for acute appendicitis as per surgery team. On PPI BID. May require EGD pending stabilization. GI team aware.   : Suspect ATN iso in the setting of septic shock. No hydro on renal sono. Urine output remains adequate. Supportive care with IVFs. Nephrology following.   ID: Discontinued cefepime and starting ceftriaxone 2g q12h as per ID recs. Remains on doxycycline. Unclear source of infection, possible UTI vs recurrent L axilla abscess. Blood cultures negative. Low threshold to perform LP given persistent fevers and inconsistent neuro status.   Endo: Blood glucose stable. Goal 110-180   Heme: SC heparin for dvt ppx. Ordered lower ext doppler given elevated d-dimer to r/o dvts.     CRITICAL CARE TIME SPENT: 40 minutes   Time spent evaluating/treating patient with medical issues that pose a high risk for life threatening deterioration, and/or end-organ damage, reviewing data/labs/imaging, discussing case with multidisciplinary team, discussing plan/goals of care with patient/family. Non-inclusive of procedure time. Date of entry of this note is equal to the date of services rendered.  23 year old male with no medical history presents with fevers and nausea associated with rash and axillary lymphadenopathy. Had recent abscess from left axilla drained on Monday (3/4). Labs here demonstrate leukocytosis with bandemia, metabolic acidosis, and acute renal failure. Was admitted to ICU for further management of distributive shock state requiring vasopressor support.     Neuro: Waxing and waning mental status. As per mother patient appeared more confused and was not making sense overnight. Upon my examination, patient was alert and oriented. Prior CT head negative. May benefit from LP to r/o meningitis if he fails to improve with current therapies. Tylenol ATC   Cardio: Sinus tachycardia likely compensatory in setting of persistent fevers. Currently on maintenance fluids to cover insensible losses. No longer requiring vasopressor support. Ordered repeat lactate to assess for clearance.   Resp: Tachypneic overnight, attempted to place on bipap to support work of breathing, however patient did not tolerate mask. Currently on 2L NC with adequate saturation. Ordered CXR and morning ABG  GI: CT abd with appendicolith and esophageal thickening. Low suspicion for acute appendicitis as per surgery team. On PPI BID. May require EGD pending stabilization. GI team aware.   : Suspect ATN iso in the setting of septic shock. No hydro on renal sono. Urine output remains adequate. Supportive care with IVFs. Nephrology following.   ID: Discontinued cefepime and starting ceftriaxone 2g q12h as per ID recs. Remains on doxycycline. Unclear source of infection, possible UTI vs recurrent L axilla abscess. Blood cultures negative. Low threshold to perform LP given persistent fevers and inconsistent neuro status.   Endo: Blood glucose stable. Goal 110-180   Heme: SC heparin for dvt ppx. Ordered lower ext doppler given elevated d-dimer to r/o dvts.     CRITICAL CARE TIME SPENT: 40 minutes   Time spent evaluating/treating patient with medical issues that pose a high risk for life threatening deterioration, and/or end-organ damage, reviewing data/labs/imaging, discussing case with multidisciplinary team, discussing plan/goals of care with patient/family. Non-inclusive of procedure time. Date of entry of this note is equal to the date of services rendered.

## 2024-03-07 NOTE — PROGRESS NOTE ADULT - ASSESSMENT
23M no med hx no meds or surgeries  presents for fever, rigors , nausea a few episodes of loose stools and emesis since last Saturday. On Sunday, he developed a diffuse macular papular pruritic rash. On Monday he went to urgent care and had a teaspoon of pus expressed from an abscess in his left axilla. Admitted to ICU for severe septic shock, briefly required pressors.     Neuro: A&Ox3, pain control with Tylenol, lidocaine patch, Tramadol PRN; CT head with no acute pathology  Cardio: maintaining soft BPs, TTE with normal systolic fx with EF 70-75%; will d/c midodrine  Pulm: CT chest on admission with no acute pathology; briefly was on 2L NC overnight but now sating well on RA  GI: CT Abd: mildly enlarged L axillary node, Appendix slightly thickened measuring 8 mm in caliber without adjacent   stranding. Appendicolith in the proximal appendix. Findings are equivocal or acute appendicitis; Surgery following, no acute intervention at this time; GI following, pt started on regular diet, PPI BID, considering EGD pending clinical course; GI PCR pending given diarrhea on admission history  Renal: ATN likely 2/2 sepsis, KUB with no acute renal pathology, renal indices improving today, UOP of 5L in last 24 hours, change IVF to LR 125cc/hr; d/c sodium bicarb  ID: c/w Rocephin, Doxycycline IV, and Vancomycin by level; blood cultures NG at 24 hours, lactate now wnl, L axilla U/S: Small heterogeneous subcutaneous fluid collection in the left axilla region most likely corresponding with abscess; Lyme and HIV negative; f/u UCx and Francicella tularensis panel. ID consulted, unsuccessful LP attempt this AM, but no need for LP as low suspicion for meningitis.   Endo: no active issues at this time  Heme: subq heparin for DVT prophylaxis  Dispo: full code   23M no med hx no meds or surgeries  presents for fever, rigors , nausea a few episodes of loose stools and emesis since last Saturday. On Sunday, he developed a diffuse macular papular pruritic rash. On Monday he went to urgent care and had a teaspoon of pus expressed from an abscess in his left axilla. Admitted to ICU for severe septic shock, briefly required pressors.     Neuro: A&Ox3, pain control with Tylenol, lidocaine patch, Tramadol PRN; CT head with no acute pathology  Cardio: maintaining soft BPs, TTE with normal systolic fx with EF 70-75%; will d/c midodrine  Pulm: CT chest on admission with no acute pathology; briefly was on 2L NC overnight but now sating well on RA  GI: CT Abd: mildly enlarged L axillary node, Appendix slightly thickened measuring 8 mm in caliber without adjacent   stranding. Appendicolith in the proximal appendix. Findings are equivocal or acute appendicitis; Surgery following, no acute intervention at this time; GI following, pt started on regular diet, PPI BID, considering EGD pending clinical course; GI PCR pending given diarrhea on admission history  Renal: ATN likely 2/2 sepsis, KUB with no acute renal pathology, renal indices improving today, UOP of 5L in last 24 hours, change IVF to LR 125cc/hr; d/c sodium bicarb; f/u repeat UA and spot urine protein/Cr ratio per nephro  ID: c/w Rocephin, Doxycycline IV, and Vancomycin by level; blood cultures NG at 24 hours, lactate now wnl, L axilla U/S: Small heterogeneous subcutaneous fluid collection in the left axilla region most likely corresponding with abscess; Lyme and HIV negative; f/u UCx and Francicella tularensis panel. ID consulted, unsuccessful LP attempt this AM, but no need for LP as low suspicion for meningitis.   Endo: no active issues at this time  Heme: subq heparin for DVT prophylaxis  Dispo: full code   23M no med hx no meds or surgeries  presents for fever, rigors , nausea a few episodes of loose stools and emesis since last Saturday. On Sunday, he developed a diffuse macular papular pruritic rash. On Monday he went to urgent care and had a teaspoon of pus expressed from an abscess in his left axilla. Admitted to ICU for severe septic shock, briefly required pressors.     Neuro: A&Ox3, pain control with Tylenol, lidocaine patch, Tramadol PRN; CT head with no acute pathology  Cardio: maintaining soft BPs, TTE with normal systolic fx with EF 70-75%; will d/c midodrine  Pulm: CT chest on admission with no acute pathology; briefly was on 2L NC overnight but now sating well on RA  GI: CT Abd: mildly enlarged L axillary node, Appendix slightly thickened measuring 8 mm in caliber without adjacent   stranding. Appendicolith in the proximal appendix. Findings are equivocal or acute appendicitis; Surgery following, no acute intervention at this time; GI following, pt started on regular diet, PPI BID, considering EGD pending clinical course; GI PCR pending given diarrhea on admission history  Renal: ATN likely 2/2 sepsis, KUB with no acute renal pathology, renal indices improving today, UOP of 5L in last 24 hours, change IVF to LR 125cc/hr; d/c sodium bicarb; f/u repeat UA and spot urine protein/Cr ratio per nephro  ID: c/w Rocephin and Doxycycline IV, will hold off on vanco per ID rec; blood cultures NG at 24 hours, lactate now wnl, L axilla U/S: Small heterogeneous subcutaneous fluid collection in the left axilla region most likely corresponding with abscess; Lyme and HIV negative; f/u UCx and Francicella tularensis panel. ID consulted, unsuccessful LP attempt this AM, but no need for LP as low suspicion for meningitis.   Endo: no active issues at this time  Heme: subq heparin for DVT prophylaxis  Dispo: full code   23M no med hx no meds or surgeries  presents for fever, rigors , nausea a few episodes of loose stools and emesis since last Saturday. On Sunday, he developed a diffuse macular papular pruritic rash. On Monday he went to urgent care and had a teaspoon of pus expressed from an abscess in his left axilla. Admitted to ICU for severe septic shock, briefly required pressors.     Neuro: A&Ox3, pain control with Tylenol, lidocaine patch, Tramadol PRN; CT head with no acute pathology  Cardio: maintaining soft BPs, TTE with normal systolic fx with EF 70-75%; will d/c midodrine  Pulm: CT chest on admission with no acute pathology; briefly was on 2L NC overnight but now sating well on RA  GI: CT Abd: mildly enlarged L axillary node, Appendix slightly thickened measuring 8 mm in caliber without adjacent   stranding. Appendicolith in the proximal appendix. Findings are equivocal or acute appendicitis; Surgery following, no acute intervention at this time; GI following, pt started on regular diet, PPI BID, considering EGD pending clinical course; GI PCR pending given diarrhea on admission history  Renal: ATN likely 2/2 sepsis, KUB with no acute renal pathology, renal indices improving today, UOP of 5L in last 24 hours, change IVF to LR 125cc/hr; d/c sodium bicarb; f/u repeat UA and spot urine protein/Cr ratio per nephro  ID: worsening leukocytosis though pt received IV steroids yesterday, c/w Rocephin and Doxycycline IV, will hold off on vanco per ID rec; blood cultures NG at 24 hours, lactate now wnl, L axilla U/S: Small heterogeneous subcutaneous fluid collection in the left axilla region most likely corresponding with abscess; Lyme and HIV negative; f/u UCx and Francicella tularensis panel. ID consulted, unsuccessful LP attempt this AM, but no need for LP as low suspicion for meningitis.   Endo: no active issues at this time  Heme: subq heparin for DVT prophylaxis  Dispo: full code   23M no med hx no meds or surgeries  presents for fever, rigors , nausea a few episodes of loose stools and emesis since last Saturday. On Sunday, he developed a diffuse macular papular pruritic rash. On Monday he went to urgent care and had a teaspoon of pus expressed from an abscess in his left axilla. Admitted to ICU for severe septic shock, briefly required pressors.     Neuro: A&Ox3, pain control with Tylenol, lidocaine patch, Tramadol PRN; CT head with no acute pathology  Cardio: maintaining soft BPs, TTE with normal systolic fx with EF 70-75%; will d/c midodrine  Pulm: CT chest on admission with no acute pathology; briefly was on 2L NC overnight but now sating well on RA  GI: CT Abd: mildly enlarged L axillary node, Appendix slightly thickened measuring 8 mm in caliber without adjacent   stranding. Appendicolith in the proximal appendix. Findings are equivocal or acute appendicitis; Surgery following, no acute intervention at this time; GI following, pt started on regular diet, PPI BID, considering EGD pending clinical course; GI PCR pending given diarrhea on admission history  Renal: ATN likely 2/2 sepsis, KUB with no acute renal pathology, renal indices improving today, UOP of 5L in last 24 hours, change IVF to LR 125cc/hr; d/c sodium bicarb; f/u repeat UA and spot urine protein/Cr ratio per nephro  ID: worsening leukocytosis though pt received IV steroids yesterday, c/w Rocephin and Doxycycline IV, will hold off on vanco per ID rec; blood cultures NG at 24 hours, lactate now wnl, L axilla U/S: Small heterogeneous subcutaneous fluid collection in the left axilla region most likely corresponding with abscess; Lyme and HIV negative; f/u UCx and Francicella tularensis panel, and rheumatology panel. ID consulted, unsuccessful LP attempt this AM, but no need for LP as low suspicion for meningitis.   Endo: no active issues at this time  Heme: subq heparin for DVT prophylaxis  Dispo: full code

## 2024-03-07 NOTE — PROGRESS NOTE ADULT - SUBJECTIVE AND OBJECTIVE BOX
[facility]  Stefan Rayo PA-C  [address] [place] [address]  [phone]      INTERVAL HPI/OVERNIGHT EVENTS:  Pt s/e in ICU  Pt more alert today, denies GI complaints    MEDICATIONS  (STANDING):  cefTRIAXone   IVPB 2000 milliGRAM(s) IV Intermittent every 12 hours  doxycycline IVPB 100 milliGRAM(s) IV Intermittent every 12 hours  FIRST- Mouthwash  BLM 10 milliLiter(s) Swish and Spit four times a day  heparin   Injectable 5000 Unit(s) SubCutaneous every 8 hours  lactated ringers. 1000 milliLiter(s) (125 mL/Hr) IV Continuous <Continuous>  lidocaine   4% Patch 2 Patch Transdermal every 24 hours  pantoprazole  Injectable 40 milliGRAM(s) IV Push every 12 hours    MEDICATIONS  (PRN):  acetaminophen     Tablet .. 650 milliGRAM(s) Oral every 6 hours PRN Temp greater or equal to 38C (100.4F)  acetaminophen     Tablet .. 650 milliGRAM(s) Oral every 6 hours PRN Mild Pain (1 - 3)  melatonin 3 milliGRAM(s) Oral at bedtime PRN Insomnia  ondansetron Injectable 4 milliGRAM(s) IV Push every 4 hours PRN Nausea and/or Vomiting  traMADol 25 milliGRAM(s) Oral every 6 hours PRN Severe Pain (7 - 10)      Allergies    No Known Allergies    PHYSICAL EXAM:   Vital Signs:  Vital Signs Last 24 Hrs  T(C): 36.4 (07 Mar 2024 12:02), Max: 38.2 (06 Mar 2024 23:42)  T(F): 97.6 (07 Mar 2024 12:02), Max: 100.7 (06 Mar 2024 23:42)  HR: 113 (07 Mar 2024 12:00) (102 - 148)  BP: 130/82 (07 Mar 2024 12:00) (95/47 - 130/82)  BP(mean): 102 (07 Mar 2024 12:00) (63 - 102)  RR: 27 (07 Mar 2024 12:00) (19 - 48)  SpO2: 96% (07 Mar 2024 12:00) (91% - 97%)    Parameters below as of 06 Mar 2024 23:00  Patient On (Oxygen Delivery Method): nasal cannula  O2 Flow (L/min): 2    Daily     Daily Weight in k.2 (07 Mar 2024 05:00)    GENERAL:  Appears stated age  HEENT:  NC/AT  CHEST:  Full & symmetric excursion  HEART:  Regular rhythm  ABDOMEN:  Soft, non-tender, non-distended  EXTEREMITIES:  no cyanosis  SKIN:  No rash  NEURO:  Alert      LABS:                        11.7   29.51 )-----------( 100      ( 07 Mar 2024 04:37 )             32.6     03-07    135  |  103  |  57<H>  ----------------------------<  117<H>  3.9   |  24  |  4.10<H>    Ca    7.7<L>      07 Mar 2024 04:37  Phos  3.1     03-07  Mg     2.1     03-07    TPro  5.4<L>  /  Alb  1.8<L>  /  TBili  1.7<H>  /  DBili  x   /  AST  29  /  ALT  49  /  AlkPhos  79  03-07    PT/INR - ( 06 Mar 2024 09:38 )   PT: 16.1 sec;   INR: 1.39 ratio         PTT - ( 06 Mar 2024 09:38 )  PTT:43.0 sec  Urinalysis Basic - ( 07 Mar 2024 11:30 )    Color: Yellow / Appearance: Clear / S.007 / pH: x  Gluc: x / Ketone: Negative mg/dL  / Bili: Negative / Urobili: 0.2 mg/dL   Blood: x / Protein: Trace mg/dL / Nitrite: Negative   Leuk Esterase: Negative / RBC: 0 /HPF / WBC 0 /HPF   Sq Epi: x / Non Sq Epi: x / Bacteria: x

## 2024-03-07 NOTE — DIETITIAN INITIAL EVALUATION ADULT - SIGNS/SYMPTOMS
as evidenced by , HDL 9, LDL 20, hepatic steatosis per CT. as evidenced by poor appetite/po intake past 4 days in setting of acute illness/sepsis.

## 2024-03-07 NOTE — DIETITIAN INITIAL EVALUATION ADULT - PERTINENT LABORATORY DATA
03-07    135  |  103  |  57<H>  ----------------------------<  117<H>  3.9   |  24  |  4.10<H>    Ca    7.7<L>      07 Mar 2024 04:37  Phos  3.1     03-07  Mg     2.1     03-07    TPro  5.4<L>  /  Alb  1.8<L>  /  TBili  1.7<H>  /  DBili  x   /  AST  29  /  ALT  49  /  AlkPhos  79  03-07  A1C with Estimated Average Glucose Result: 5.4 % (03-06-24 @ 04:30)

## 2024-03-08 DIAGNOSIS — D64.9 ANEMIA, UNSPECIFIED: ICD-10-CM

## 2024-03-08 LAB
24R-OH-CALCIDIOL SERPL-MCNC: 8.3 NG/ML — LOW (ref 30–80)
ALBUMIN SERPL ELPH-MCNC: 1.8 G/DL — LOW (ref 3.3–5)
ALP SERPL-CCNC: 115 U/L — SIGNIFICANT CHANGE UP (ref 40–120)
ALT FLD-CCNC: 47 U/L — SIGNIFICANT CHANGE UP (ref 12–78)
ANION GAP SERPL CALC-SCNC: 7 MMOL/L — SIGNIFICANT CHANGE UP (ref 5–17)
AST SERPL-CCNC: 28 U/L — SIGNIFICANT CHANGE UP (ref 15–37)
BASOPHILS # BLD AUTO: 0 K/UL — SIGNIFICANT CHANGE UP (ref 0–0.2)
BASOPHILS NFR BLD AUTO: 0 % — SIGNIFICANT CHANGE UP (ref 0–2)
BILIRUB SERPL-MCNC: 0.7 MG/DL — SIGNIFICANT CHANGE UP (ref 0.2–1.2)
BUN SERPL-MCNC: 51 MG/DL — HIGH (ref 7–23)
C3 SERPL-MCNC: 128 MG/DL — SIGNIFICANT CHANGE UP (ref 81–157)
C4 SERPL-MCNC: 24 MG/DL — SIGNIFICANT CHANGE UP (ref 13–39)
CALCIUM SERPL-MCNC: 8.5 MG/DL — SIGNIFICANT CHANGE UP (ref 8.5–10.1)
CHLORIDE SERPL-SCNC: 109 MMOL/L — HIGH (ref 96–108)
CO2 SERPL-SCNC: 27 MMOL/L — SIGNIFICANT CHANGE UP (ref 22–31)
CREAT SERPL-MCNC: 2.1 MG/DL — HIGH (ref 0.5–1.3)
EGFR: 44 ML/MIN/1.73M2 — LOW
EOSINOPHIL # BLD AUTO: 0 K/UL — SIGNIFICANT CHANGE UP (ref 0–0.5)
EOSINOPHIL NFR BLD AUTO: 0 % — SIGNIFICANT CHANGE UP (ref 0–6)
GLUCOSE SERPL-MCNC: 109 MG/DL — HIGH (ref 70–99)
HCT VFR BLD CALC: 32.5 % — LOW (ref 39–50)
HGB BLD-MCNC: 11.7 G/DL — LOW (ref 13–17)
LYMPHOCYTES # BLD AUTO: 16 % — SIGNIFICANT CHANGE UP (ref 13–44)
LYMPHOCYTES # BLD AUTO: 4.42 K/UL — HIGH (ref 1–3.3)
MAGNESIUM SERPL-MCNC: 2.5 MG/DL — SIGNIFICANT CHANGE UP (ref 1.6–2.6)
MCHC RBC-ENTMCNC: 30.2 PG — SIGNIFICANT CHANGE UP (ref 27–34)
MCHC RBC-ENTMCNC: 36 GM/DL — SIGNIFICANT CHANGE UP (ref 32–36)
MCV RBC AUTO: 83.8 FL — SIGNIFICANT CHANGE UP (ref 80–100)
MONOCYTES # BLD AUTO: 1.94 K/UL — HIGH (ref 0–0.9)
MONOCYTES NFR BLD AUTO: 7 % — SIGNIFICANT CHANGE UP (ref 2–14)
MYELOPEROXIDASE AB SER-ACNC: 5 UNITS — SIGNIFICANT CHANGE UP
MYELOPEROXIDASE CELLS FLD QL: NEGATIVE — SIGNIFICANT CHANGE UP
NEUTROPHILS # BLD AUTO: 21.29 K/UL — HIGH (ref 1.8–7.4)
NEUTROPHILS NFR BLD AUTO: 70 % — SIGNIFICANT CHANGE UP (ref 43–77)
NRBC # BLD: SIGNIFICANT CHANGE UP /100 WBCS (ref 0–0)
PHOSPHATE SERPL-MCNC: 3 MG/DL — SIGNIFICANT CHANGE UP (ref 2.5–4.5)
PLATELET # BLD AUTO: 66 K/UL — LOW (ref 150–400)
POTASSIUM SERPL-MCNC: 4 MMOL/L — SIGNIFICANT CHANGE UP (ref 3.5–5.3)
POTASSIUM SERPL-SCNC: 4 MMOL/L — SIGNIFICANT CHANGE UP (ref 3.5–5.3)
PROT SERPL-MCNC: 5.6 G/DL — LOW (ref 6–8.3)
PROTEINASE3 AB FLD-ACNC: <5 UNITS — SIGNIFICANT CHANGE UP
PROTEINASE3 AB SER-ACNC: NEGATIVE — SIGNIFICANT CHANGE UP
RBC # BLD: 3.88 M/UL — LOW (ref 4.2–5.8)
RBC # FLD: 13.3 % — SIGNIFICANT CHANGE UP (ref 10.3–14.5)
RHEUMATOID FACT SERPL-ACNC: 17 IU/ML — HIGH (ref 0–13)
SODIUM SERPL-SCNC: 143 MMOL/L — SIGNIFICANT CHANGE UP (ref 135–145)
VANCOMYCIN TROUGH SERPL-MCNC: 5.8 UG/ML — LOW (ref 10–20)
WBC # BLD: 27.65 K/UL — HIGH (ref 3.8–10.5)
WBC # FLD AUTO: 27.65 K/UL — HIGH (ref 3.8–10.5)

## 2024-03-08 PROCEDURE — 99232 SBSQ HOSP IP/OBS MODERATE 35: CPT

## 2024-03-08 PROCEDURE — 99231 SBSQ HOSP IP/OBS SF/LOW 25: CPT

## 2024-03-08 PROCEDURE — 99233 SBSQ HOSP IP/OBS HIGH 50: CPT

## 2024-03-08 RX ORDER — CHOLECALCIFEROL (VITAMIN D3) 125 MCG
1000 CAPSULE ORAL DAILY
Refills: 0 | Status: DISCONTINUED | OUTPATIENT
Start: 2024-03-09 | End: 2024-03-09

## 2024-03-08 RX ORDER — ERGOCALCIFEROL 1.25 MG/1
50000 CAPSULE ORAL ONCE
Refills: 0 | Status: COMPLETED | OUTPATIENT
Start: 2024-03-08 | End: 2024-03-08

## 2024-03-08 RX ADMIN — DIPHENHYDRAMINE HYDROCHLORIDE AND LIDOCAINE HYDROCHLORIDE AND ALUMINUM HYDROXIDE AND MAGNESIUM HYDRO 10 MILLILITER(S): KIT at 12:04

## 2024-03-08 RX ADMIN — DIPHENHYDRAMINE HYDROCHLORIDE AND LIDOCAINE HYDROCHLORIDE AND ALUMINUM HYDROXIDE AND MAGNESIUM HYDRO 10 MILLILITER(S): KIT at 23:15

## 2024-03-08 RX ADMIN — PANTOPRAZOLE SODIUM 40 MILLIGRAM(S): 20 TABLET, DELAYED RELEASE ORAL at 17:50

## 2024-03-08 RX ADMIN — ERGOCALCIFEROL 50000 UNIT(S): 1.25 CAPSULE ORAL at 17:44

## 2024-03-08 RX ADMIN — CEFTRIAXONE 100 MILLIGRAM(S): 500 INJECTION, POWDER, FOR SOLUTION INTRAMUSCULAR; INTRAVENOUS at 10:27

## 2024-03-08 RX ADMIN — Medication 100 MILLIGRAM(S): at 05:20

## 2024-03-08 RX ADMIN — DIPHENHYDRAMINE HYDROCHLORIDE AND LIDOCAINE HYDROCHLORIDE AND ALUMINUM HYDROXIDE AND MAGNESIUM HYDRO 10 MILLILITER(S): KIT at 17:44

## 2024-03-08 RX ADMIN — LIDOCAINE 2 PATCH: 4 CREAM TOPICAL at 17:55

## 2024-03-08 RX ADMIN — HEPARIN SODIUM 5000 UNIT(S): 5000 INJECTION INTRAVENOUS; SUBCUTANEOUS at 05:20

## 2024-03-08 RX ADMIN — LIDOCAINE 2 PATCH: 4 CREAM TOPICAL at 10:11

## 2024-03-08 RX ADMIN — PANTOPRAZOLE SODIUM 40 MILLIGRAM(S): 20 TABLET, DELAYED RELEASE ORAL at 05:21

## 2024-03-08 RX ADMIN — Medication 100 MILLIGRAM(S): at 17:44

## 2024-03-08 RX ADMIN — DIPHENHYDRAMINE HYDROCHLORIDE AND LIDOCAINE HYDROCHLORIDE AND ALUMINUM HYDROXIDE AND MAGNESIUM HYDRO 10 MILLILITER(S): KIT at 05:44

## 2024-03-08 RX ADMIN — DIPHENHYDRAMINE HYDROCHLORIDE AND LIDOCAINE HYDROCHLORIDE AND ALUMINUM HYDROXIDE AND MAGNESIUM HYDRO 10 MILLILITER(S): KIT at 01:17

## 2024-03-08 RX ADMIN — LIDOCAINE 2 PATCH: 4 CREAM TOPICAL at 05:21

## 2024-03-08 RX ADMIN — Medication 1 TABLET(S): at 12:04

## 2024-03-08 NOTE — CASE MANAGEMENT PROGRESS NOTE - NSCMPROGRESSNOTE_GEN_ALL_CORE
Pt on IV Doxy and Rocephin WBC 27 today. Pt for possible transition home over the weekend. Pt is for home with no skilled needs as the pt is independent.

## 2024-03-08 NOTE — PROGRESS NOTE ADULT - SUBJECTIVE AND OBJECTIVE BOX
Patient is a 23y old  Male who presents with a chief complaint of Sepsis and NATALIA (06 Mar 2024 09:19)  Patient seen in follow up for NATALIA.        PAST MEDICAL HISTORY:  No pertinent past medical history      MEDICATIONS  (STANDING):  cefTRIAXone   IVPB 2000 milliGRAM(s) IV Intermittent every 12 hours  doxycycline IVPB 100 milliGRAM(s) IV Intermittent every 12 hours  FIRST- Mouthwash  BLM 10 milliLiter(s) Swish and Spit four times a day  heparin   Injectable 5000 Unit(s) SubCutaneous every 8 hours  lactated ringers. 1000 milliLiter(s) (125 mL/Hr) IV Continuous <Continuous>  lidocaine   4% Patch 2 Patch Transdermal every 24 hours  multivitamin/minerals 1 Tablet(s) Oral daily  pantoprazole  Injectable 40 milliGRAM(s) IV Push every 12 hours    MEDICATIONS  (PRN):  acetaminophen     Tablet .. 650 milliGRAM(s) Oral every 6 hours PRN Temp greater or equal to 38C (100.4F)  acetaminophen     Tablet .. 650 milliGRAM(s) Oral every 6 hours PRN Mild Pain (1 - 3)  melatonin 3 milliGRAM(s) Oral at bedtime PRN Insomnia  ondansetron Injectable 4 milliGRAM(s) IV Push every 4 hours PRN Nausea and/or Vomiting  traMADol 25 milliGRAM(s) Oral every 6 hours PRN Severe Pain (7 - 10)    T(C): 36.6 (03-08-24 @ 11:35), Max: 38.2 (03-06-24 @ 23:42)  HR: 90 (03-08-24 @ 11:35) (89 - 148)  BP: 121/83 (03-08-24 @ 11:35) (95/47 - 132/58)  RR: 18 (03-08-24 @ 11:35)  SpO2: 91% (03-08-24 @ 11:35)  Wt(kg): --  I&O's Detail    07 Mar 2024 07:01  -  08 Mar 2024 07:00  --------------------------------------------------------  IN:    dextrose 5% + sodium chloride 0.45% w/ Additives: 200 mL    IV PiggyBack: 50 mL    IV PiggyBack: 250 mL    IV PiggyBack: 100 mL    Lactated Ringers: 2000 mL  Total IN: 2600 mL    OUT:    Phenylephrine: 0 mL    Voided (mL): 3100 mL  Total OUT: 3100 mL    Total NET: -500 mL                    PHYSICAL EXAM:  General: No distress  Respiratory: b/l air entry  Cardiovascular: S1 S2  Gastrointestinal: soft  Extremities:  no edema                         LABORATORY:                        11.7   27.65 )-----------( 66       ( 08 Mar 2024 04:58 )             32.5     03-08    143  |  109<H>  |  51<H>  ----------------------------<  109<H>  4.0   |  27  |  2.10<H>    Ca    8.5      08 Mar 2024 04:58  Phos  3.0     03-08  Mg     2.5     03-08    TPro  5.6<L>  /  Alb  1.8<L>  /  TBili  0.7  /  DBili  x   /  AST  28  /  ALT  47  /  AlkPhos  115  03-08    Sodium: 143 mmol/L (03-08 @ 04:58)  Sodium: 135 mmol/L (03-07 @ 04:37)  Sodium: 131 mmol/L (03-06 @ 17:30)    Potassium: 4.0 mmol/L (03-08 @ 04:58)  Potassium: 3.9 mmol/L (03-07 @ 04:37)  Potassium: 4.3 mmol/L (03-06 @ 17:30)    Hemoglobin: 11.7 g/dL (03-08 @ 04:58)  Hemoglobin: 11.7 g/dL (03-07 @ 04:37)  Hemoglobin: 12.6 g/dL (03-06 @ 17:30)  Hemoglobin: 12.9 g/dL (03-06 @ 04:30)    Creatinine, Serum 2.10 (03-08 @ 04:58)  Creatinine, Serum 4.10 (03-07 @ 04:37)  Creatinine, Serum 4.80 (03-06 @ 17:30)  Creatinine, Serum 4.80 (03-06 @ 04:30)        LIVER FUNCTIONS - ( 08 Mar 2024 04:58 )  Alb: 1.8 g/dL / Pro: 5.6 g/dL / ALK PHOS: 115 U/L / ALT: 47 U/L / AST: 28 U/L / GGT: x           Urinalysis Basic - ( 08 Mar 2024 04:58 )    Color: x / Appearance: x / SG: x / pH: x  Gluc: 109 mg/dL / Ketone: x  / Bili: x / Urobili: x   Blood: x / Protein: x / Nitrite: x   Leuk Esterase: x / RBC: x / WBC x   Sq Epi: x / Non Sq Epi: x / Bacteria: x      ABG - ( 07 Mar 2024 06:08 )  pH, Arterial: 7.42  pH, Blood: x     /  pCO2: 34    /  pO2: 77    / HCO3: 22    / Base Excess: -2.4  /  SaO2: 96.8

## 2024-03-08 NOTE — PROGRESS NOTE ADULT - SUBJECTIVE AND OBJECTIVE BOX
[facility]  INFECTIOUS DISEASES - John Drummond, [address], [place] [address]  Tel: [phone]     Fax: [phone]  =======================================================    GARY DONALDSON [id number]    Follow up: No fever. Feels better overall, seen sitting up on the side of the bed.    Allergies:  No Known Allergies      Antibiotics:  acetaminophen     Tablet .. 650 milliGRAM(s) Oral every 6 hours PRN  acetaminophen     Tablet .. 650 milliGRAM(s) Oral every 6 hours PRN  doxycycline monohydrate Capsule 100 milliGRAM(s) Oral every 12 hours  FIRST- Mouthwash  BLM 10 milliLiter(s) Swish and Spit four times a day  lactated ringers. 1000 milliLiter(s) IV Continuous <Continuous>  levoFLOXacin  Tablet 750 milliGRAM(s) Oral every 24 hours  lidocaine   4% Patch 2 Patch Transdermal every 24 hours  melatonin 3 milliGRAM(s) Oral at bedtime PRN  multivitamin/minerals 1 Tablet(s) Oral daily  ondansetron Injectable 4 milliGRAM(s) IV Push every 4 hours PRN  pantoprazole  Injectable 40 milliGRAM(s) IV Push every 12 hours  traMADol 25 milliGRAM(s) Oral every 6 hours PRN       REVIEW OF SYSTEMS:  CONSTITUTIONAL:  (+) fever  HEENT:  No sore throat or runny nose.  CARDIOVASCULAR:  No chest pain or SOB.  RESPIRATORY:  No cough, shortness of breath  GASTROINTESTINAL: No nausea, vomiting or diarrhea  GENITOURINARY:  No dysuria, frequency or urgency  MUSCULOSKELETAL:  no joint swelling, no joint aches, no back pain  NEUROLOGIC: (+) mild headache--resolved  PSYCHIATRIC:  No disorder of thought or mood.     Physical Exam:  ICU Vital Signs Last 24 Hrs  T(C): 36.6 (08 Mar 2024 11:35), Max: 36.8 (07 Mar 2024 20:14)  T(F): 97.8 (08 Mar 2024 11:35), Max: 98.2 (07 Mar 2024 20:14)  HR: 90 (08 Mar 2024 11:35) (89 - 120)  BP: 121/83 (08 Mar 2024 11:35) (100/68 - 132/58)  BP(mean): 85 (08 Mar 2024 05:00) (85 - 96)  ABP: --  ABP(mean): --  RR: 18 (08 Mar 2024 11:35) (18 - 41)  SpO2: 91% (08 Mar 2024 11:35) (89% - 99%)    O2 Parameters below as of 08 Mar 2024 11:35  Patient On (Oxygen Delivery Method): room air      GEN: NAD  HEENT: normocephalic and atraumatic.   NECK: Supple.   LUNGS: Normal respiratory effort  HEART: Regular rate and rhythm  ABDOMEN: Soft, nontender, and nondistended.    EXTREMITIES: No leg edema.  NEUROLOGIC: AO x 3, no photophobia, no nuchal rigidity  PSYCHIATRIC: Appropriate affect .  SKIN: (+) rash on L foot and leg--resolved      Labs:  03-08    143  |  109<H>  |  51<H>  ----------------------------<  109<H>  4.0   |  27  |  2.10<H>    Ca    8.5      08 Mar 2024 04:58  Phos  3.0     03-08  Mg     2.5     03-08    TPro  5.6<L>  /  Alb  1.8<L>  /  TBili  0.7  /  DBili  x   /  AST  28  /  ALT  47  /  AlkPhos  115  03-08                          11.7   27.65 )-----------( 66       ( 08 Mar 2024 04:58 )             32.5       Urinalysis Basic - ( 08 Mar 2024 04:58 )    Color: x / Appearance: x / SG: x / pH: x  Gluc: 109 mg/dL / Ketone: x  / Bili: x / Urobili: x   Blood: x / Protein: x / Nitrite: x   Leuk Esterase: x / RBC: x / WBC x   Sq Epi: x / Non Sq Epi: x / Bacteria: x      LIVER FUNCTIONS - ( 08 Mar 2024 04:58 )  Alb: 1.8 g/dL / Pro: 5.6 g/dL / ALK PHOS: 115 U/L / ALT: 47 U/L / AST: 28 U/L / GGT: x             RECENT CULTURES:  03-06 @ 05:00 Clean Catch Clean Catch (Midstream)     No growth        03-05 @ 14:45 .Blood Blood     No growth at 48 Hours        03-05 @ 14:33 .Blood Blood     No growth at 48 Hours              All imaging and data are reviewed.

## 2024-03-08 NOTE — PHYSICAL THERAPY INITIAL EVALUATION ADULT - ADDITIONAL COMMENTS
Patient reports he lives in private home with family with 3 steps to enter no stairs inside. Patient reports being independent with no assistive device with all functional mobility and ADLs.

## 2024-03-08 NOTE — PROGRESS NOTE ADULT - TIME BILLING
Reviewing chart notes and data, reviewing telemetry monitor records, face to face time counseling the patient
Reviewing chart notes and data, reviewing telemetry monitor records, face to face time counseling the patient
Reviewing chart notes and data, face to face time counseling the patient, communicating with Dr. Patel renal - continue IVF, coordinating care with SW/CM at [facility].

## 2024-03-08 NOTE — PROGRESS NOTE ADULT - PROBLEM SELECTOR PLAN 6
dvt ppx: heparin       Discussed with mother at bedside, aware and in agreement with above. dvt ppx: SCDs, encourage ambulation   PT evaluation      #Thrombocytopenia  Could be in setting of infection  -stop heparin  -check HIT ab  -Follow up AM CBC    Discussed with mother at bedside, aware and in agreement with above.

## 2024-03-08 NOTE — PROGRESS NOTE ADULT - ASSESSMENT
24 yo male with hx of skin infections but no other significant pmh, presents for fever, chills, nausea for x 3 days. Found to have sepsis of unclear etiology, as well as NATALIA. Empirically being treated for complicated UTI as well as tularemia given pet rabbit. Has an axillary abscess but otherwise no other obvious localizing signs/symptoms of infection. MRSA nasal PCR negative. Lower clinical suspicion for meningitis.    Fever resolved and patient feels better. Renal function improving and WBC improved today, and leukocytosis could be related to IV steroids. Has worsening thrombocytopenia, unclear if infection related given overall improving. Blood cultures remain no growth. Urine culture unrevealing, but collected after receiving antibiotics. HIV negative. TTE showed no vegetation.    #Leukocytosis  #Thrombocytopenia  #Possible UTI  #L axillary abscess  #Sepsis  #NATALIA    -suggest levofloxacin 750mg PO q24h until 3/14 to complete 10 days  -switch doxycyline to 100mg PO q12h until 3/19 to complete 14 days  -follow cultures to completion  -repeat francisella tularensis serology in 1-2 weeks  -monitor WBC, platelets  -outpatient follow up with ID  -discussed with mother at bedside  -discussed with Dr. Castillo  -I will be covered by Dr. Maynor Maldonado this weekend, 3/9-3/10/24.    Mary Pitts MD  Division of Infectious Diseases   Cell [phone] between 8am and 6pm   After 6pm and weekends please call ID service at [phone].     35 minutes spent on total encounter assessing patient, examination, chart review, counseling and coordinating care by the attending physician/nurse/care manager.

## 2024-03-08 NOTE — PROGRESS NOTE ADULT - ASSESSMENT
22 yo male with no significant pmh presents for fever, chills, nausea for >3 days admitted for severe sepsis and jasmin likely ATN.

## 2024-03-08 NOTE — PROGRESS NOTE ADULT - PROBLEM SELECTOR PLAN 1
Patient meets severe sepsis criteria on admission: WBC >12,  >10% bands, HR >90,  ?likely 2/2 skin infection  -  ct a/p and chest performed  - Surgery following, recs appreciated, Patient is not a candidate for an appendectomy at this time. low suspicion for acute appendicitis   -GI PCR ordered, f/u results  - Continue IVF NS 125cc/hr  lactic acidosis resolving   - Trend WBC and monitor for fever - rising WBC could be from steroids now slowly downtrending   - Continue Tylenol 650mg for fever and mild pain q6  - Continue IV Rocephin and Doxycycline. ID Dr Pitts following recommendations appreciated  -UCx, BCx x2 - no growth      Septic shock - hypotensive requiring pressors. Monitor blood pressures. Now downgraded from ICU off vasopressor support and Midodrine Patient meets severe sepsis criteria on admission: WBC >12,  >10% bands, HR >90,  ?likely 2/2 skin infection  -  ct a/p and chest performed  - Surgery following, recs appreciated, Patient is not a candidate for an appendectomy at this time. low suspicion for acute appendicitis   -GI PCR ordered, f/u results  - Continue IVF - Cr improving. Cleared from renal standpoint for dc planning  lactic acidosis resolving   - Trend WBC and monitor for fever - rising WBC could be from steroids now slowly downtrending   - Continue Tylenol 650mg for fever and mild pain q6  - Continue IV Rocephin and Doxycycline. ID Dr Pitts following recommendations appreciated - switched to PO Levofloxacin 750mg PO q24h until 3/14 to complete 10 days and doxycyline 100mg PO q12h until 3/19 to complete 14 days  -UCx, BCx x2 - no growth    #low vitamin D level   -started on Vit D 50,000 units x 1 and then vitamin d daily      Septic shock - hypotensive requiring pressors. Monitor blood pressures. Now downgraded from ICU off vasopressor support and Midodrine

## 2024-03-08 NOTE — PHYSICAL THERAPY INITIAL EVALUATION ADULT - LEVEL OF INDEPENDENCE: STAIR NEGOTIATION, REHAB EVAL
Anticipate patient to be independent with stair negotiation based off of independence with functional mobility.

## 2024-03-08 NOTE — PROGRESS NOTE ADULT - ASSESSMENT
22y/o Male initially presenting with fevers and nausea with associated maculopapular rash, initially in ICU with lactic acidosis, NATALIA. Surgical team consulted for concern of acute appendicitis. Patient transferred from ICU to the floor. Today with VS with tachycardia, tachypneic. On exam, abdomen soft NTND, L axilla with subcutaneous mass 1-2x1-2cm tenderness, with 1 cm sinus tract, no drainage. Labs with leukocytosis to 27k.    PLAN:  - Continue abx per ID recs  - No drainable abscess collection   -  24y/o Male initially presenting with fevers and nausea with associated maculopapular rash, initially in ICU with lactic acidosis, NATALIA. Surgical team consulted for concern of acute appendicitis. Patient transferred from ICU to the floor. Today with VS with tachycardia, tachypneic. On exam, abdomen soft NTND, L axilla with subcutaneous mass 1-2x1-2cm tenderness, with 1 cm sinus tract, no drainage. Labs with leukocytosis to 27k.    PLAN:  - Continue abx per ID recs  - No drainable abscess collection   - No signs significant for appendicitis given abdomen soft NTND  - No acute surgical intervention indicated at this time    To discuss with Dr. Mckinley. 22y/o Male initially presenting with fevers and nausea with associated maculopapular rash, initially in ICU with lactic acidosis, NATALIA. Surgical team consulted for concern of acute appendicitis. Patient transferred from ICU to the floor. Today with VS with tachycardia, tachypneic. On exam, abdomen soft NTND, L axilla with subcutaneous mass 1-2x1-2cm tenderness, with 1 cm sinus tract, no drainage. Labs with leukocytosis to 27k.    PLAN:  - Continue abx per ID recs, no drainable abscess collection   - No signs significant for appendicitis given abdomen soft NTND. Rec repeat CT A/P with IV contrast when kidney function improves  - No acute surgical intervention indicated at this time. Surgical team will sign off at this time. Please reconsult as needed.    Discussed with Dr. Mckinley.

## 2024-03-08 NOTE — PROGRESS NOTE ADULT - ASSESSMENT
23 year old male with no medical history presents with fevers and nausea associated with rash and axillary lymphadenopathy. Had recent abscess from left axilla drained on Monday (3/4). Labs here demonstrate leukocytosis with bandemia, metabolic acidosis, and acute renal failure. Was admitted to ICU for further management of distributive shock state requiring vasopressor support.     Admitted with acute sepsis , NATALIA, Anemia sp ICU now downdgraded to the floor   -  tachycardia HR in 100s likely 2/2 to ongoing infectious process and distributive shock, now improving  - Patient is not complaining of any cardiac symptoms at this time.   - EKG shows sinus tachycardia 135bpm. No acute changes on EKG compared to previous.  - No meaningful evidence of volume overload.  - TTE 3/6/24 with normal LV systolic function with LVEF 70-75%.     - /58   - Off pressors and midodrine now.   - Monitor and replete lytes, keep K>4, Mg>2.  - BCx NGTD, Abx per ID    Sophie Pena FNP-C  Cardiology NP  [id number] [phone]

## 2024-03-08 NOTE — PROGRESS NOTE ADULT - SUBJECTIVE AND OBJECTIVE BOX
[facility] -- Babatunde Parsons Pannella, Patel, Savella Goodger, Cohen  Office # [phone]      Follow Up:  tachycardia     Subjective/Observations:   No events overnight resting comfortably in bed.  No complaints of chest pain, dyspnea, or palpitations reported. No signs of orthopnea or PND.  laying flat on room air     REVIEW OF SYSTEMS: All other review of systems is negative unless indicated above    PAST MEDICAL & SURGICAL HISTORY:  No pertinent past medical history      No significant past surgical history          MEDICATIONS  (STANDING):  cefTRIAXone   IVPB 2000 milliGRAM(s) IV Intermittent every 12 hours  doxycycline IVPB 100 milliGRAM(s) IV Intermittent every 12 hours  FIRST- Mouthwash  BLM 10 milliLiter(s) Swish and Spit four times a day  heparin   Injectable 5000 Unit(s) SubCutaneous every 8 hours  lactated ringers. 1000 milliLiter(s) (125 mL/Hr) IV Continuous <Continuous>  lidocaine   4% Patch 2 Patch Transdermal every 24 hours  multivitamin/minerals 1 Tablet(s) Oral daily  pantoprazole  Injectable 40 milliGRAM(s) IV Push every 12 hours    MEDICATIONS  (PRN):  acetaminophen     Tablet .. 650 milliGRAM(s) Oral every 6 hours PRN Temp greater or equal to 38C (100.4F)  acetaminophen     Tablet .. 650 milliGRAM(s) Oral every 6 hours PRN Mild Pain (1 - 3)  melatonin 3 milliGRAM(s) Oral at bedtime PRN Insomnia  ondansetron Injectable 4 milliGRAM(s) IV Push every 4 hours PRN Nausea and/or Vomiting  traMADol 25 milliGRAM(s) Oral every 6 hours PRN Severe Pain (7 - 10)      Allergies    No Known Allergies    Intolerances        Vital Signs Last 24 Hrs  T(C): 36.7 (08 Mar 2024 00:08), Max: 36.8 (07 Mar 2024 20:14)  T(F): 98 (08 Mar 2024 00:08), Max: 98.2 (07 Mar 2024 20:14)  HR: 98 (08 Mar 2024 05:00) (89 - 120)  BP: 132/58 (08 Mar 2024 05:00) (100/68 - 132/58)  BP(mean): 85 (08 Mar 2024 05:00) (81 - 102)  RR: 28 (08 Mar 2024 05:00) (21 - 41)  SpO2: 96% (08 Mar 2024 05:00) (89% - 99%)        I&O's Summary    07 Mar 2024 07:01  -  08 Mar 2024 07:00  --------------------------------------------------------  IN: 2600 mL / OUT: 3100 mL / NET: -500 mL          PHYSICAL EXAM:  TELE: not on tele   Constitutional: NAD, awake and alert, well-developed  HEENT: Moist Mucous Membranes, Anicteric  Pulmonary: Non-labored, breath sounds are clear bilaterally, No wheezing, crackles or rhonchi  Cardiovascular: Regular, S1 and S2 nl, No murmurs, rubs, gallops or clicks  Gastrointestinal: Bowel Sounds present, soft, nontender.   Lymph: No lymphadenopathy. No peripheral edema.  Skin: No visible rashes or ulcers.  Psych:  Mood & affect appropriate    LABS: All Labs Reviewed:                        11.7   27.65 )-----------( 66       ( 08 Mar 2024 04:58 )             32.5                         11.7   29.51 )-----------( 100      ( 07 Mar 2024 04:37 )             32.6                         12.6   21.76 )-----------( 120      ( 06 Mar 2024 17:30 )             35.6     08 Mar 2024 04:58    143    |  109    |  51     ----------------------------<  109    4.0     |  27     |  2.10   07 Mar 2024 04:37    135    |  103    |  57     ----------------------------<  117    3.9     |  24     |  4.10   06 Mar 2024 17:30    131    |  100    |  55     ----------------------------<  132    4.3     |  20     |  4.80     Ca    8.5        08 Mar 2024 04:58  Ca    7.7        07 Mar 2024 04:37  Ca    7.3        06 Mar 2024 17:30  Phos  3.0       08 Mar 2024 04:58  Phos  3.1       07 Mar 2024 04:37  Phos  2.7       06 Mar 2024 17:30  Mg     2.5       08 Mar 2024 04:58  Mg     2.1       07 Mar 2024 04:37  Mg     1.9       06 Mar 2024 17:30    TPro  5.6    /  Alb  1.8    /  TBili  0.7    /  DBili  x      /  AST  28     /  ALT  47     /  AlkPhos  115    08 Mar 2024 04:58  TPro  5.4    /  Alb  1.8    /  TBili  1.7    /  DBili  x      /  AST  29     /  ALT  49     /  AlkPhos  79     07 Mar 2024 04:37  TPro  5.5    /  Alb  2.0    /  TBili  3.1    /  DBili  x      /  AST  37     /  ALT  55     /  AlkPhos  68     06 Mar 2024 04:30    PT/INR - ( 06 Mar 2024 09:38 )   PT: 16.1 sec;   INR: 1.39 ratio         PTT - ( 06 Mar 2024 09:38 )  PTT:43.0 sec         EC Lead ECG:   Ventricular Rate 135 BPM    Atrial Rate 135 BPM    P-R Interval 144 ms    QRS Duration 96 ms    Q-T Interval 286 ms    QTC Calculation(Bazett) 429 ms    P Axis 41 degrees    R Axis 63 degrees    T Axis -14 degrees    Diagnosis Line Sinus tachycardia  Nonspecific T wave abnormality  Abnormal ECG  When compared with ECG of 05-MAR-2024 22:56, (Unconfirmed)  Nonspecific T wave abnormality now evident in Anterior leads  Confirmed by GARCÍA PORRAS ([id number]) on 3/6/2024 9:47:01 PM (24 @ 09:01)      TRANSTHORACIC ECHOCARDIOGRAM REPORT  ________________________________________________________________________________                                      _______       Pt. Name:       GARY DONALDSON Study Date:    3/6/2024  MRN:            [id number] YOB: 2000  Accession #:    [id number]   Age:           23 years  Account#:       [id number]  Gender:        M  Heart Rate:                 Height:        67.72 in (172.00 cm)  Rhythm:                     Weight:        0.50 lb (0.23 kg)  Blood Pressure: 83/48 mmHg  BSA/BMI:       0.16 m² / 0.08 kg/m²  ________________________________________________________________________________________  Referring Physician:    [id number] Corey Juarez  Interpreting Physician: Octavio Fine  Primary Sonographer:    Sera Jimenez RDCS    CPT:               ECHO TTE WO CON COMP W DOPP - 49261.m  Indication(s):     Shock, unspecified - R57.9  Procedure:         Transthoracic echocardiogram with 2-D, M-mode and complete                     spectral and color flow Doppler.  Ordering Location: ICU1  Admission Status:  Inpatient    _______________________________________________________________________________________     CONCLUSIONS:      1. Left ventricular cavity is normal in size. Left ventricular systolic function is hyperdynamic with an ejection fraction visually estimated at 70 to 75 %.   2. The right ventricle is not well visualized. probably normal systolic function.   3. The left atrium is normal.   4. Structurally normal mitral valve with normal leaflet excursion.   5. Structurally normal tricuspid valve with normal leaflet excursion.   6. Trileaflet aortic valve with normal systolic excursion.   7. No pericardial effusion seen.   8. The patient is tachycardic with heart rates in the range of 130-135 beats per minute throughout this examination.    ________________________________________________________________________________________  FINDINGS:     Left Ventricle:  The left ventricular cavity is normal in size. Left ventricular systolic function is hyperdynamic with an ejection fraction visually estimated at 70 to 75%.     Right Ventricle:  The right ventricle is not well visualized. Probably normal systolic function.     Left Atrium:  The left atrium is normal.     Right Atrium:  The right atrium is normal in size.     Aortic Valve:  The aortic valve appears trileaflet with normal systolic excursion.     Mitral Valve:  Structurally normal mitral valve with normal leaflet excursion.     Tricuspid Valve:  Structurally normal tricuspid valve with normal leaflet excursion.     Pulmonic Valve:  Structurally normal pulmonic valve with normal leaflet excursion.     Pericardium:  No pericardial effusion seen.  ____________________________________________________________________  QUANTITATIVE DATA:  Left Ventricle Measurements: (Indexed to BSA)     IVSd (2D):   0.9 cm  LVPWd (2D):  0.8 cm  LVIDd (2D):  4.3 cm  LVIDs (2D):  2.3 cm  LV Mass:     119 g  747.6 g/m²  Visualized LV EF%: 70 to 75%     MV E Vmax:    0.80 m/s  MV A Vmax:    0.55 m/s  MV E/A:       1.46  e' lateral:   16.90 cm/s  e' medial:    15.60 cm/s  E/e' lateral: 4.76  E/e' medial:  5.16  E/e' Average: 4.95  MV DT:        116 msec    Aorta Measurements: (Normal range) (Indexed to BSA)     Sinuses of Valsalva: 2.60 cm (3.1 - 3.7 cm)       Left Atrium Measurements: (Indexed to BSA)  LA Diam 2D: 3.00 cm    Mitral Valve Measurements:     MV E Vmax: 0.8 m/s  MV A Vmax: 0.6 m/s  MV E/A:    1.5    ________________________________________________________________________________________  Electronically signed on 3/6/2024 at 1:23:31 PM by Octavio Fine         *** Final ***      Radiology:

## 2024-03-08 NOTE — PROGRESS NOTE ADULT - ASSESSMENT
NATALIA: Prerenal azotemia, Septic ATN, NSAID nephropathy  Shock, Sepsis  Metabolic acidosis  Recent axillary abscess, s/p Drainage    03/06/24: Continue IV hydration. Add bicarb. Pressor support as needed. IV abx, ID follow up. No improvement in renal indices. Monitor vanco levels.   Avoid nephrotoxic meds as possible. Avoid ACEI, ARB, NSAIDs and IV contrast. Will follow electrolytes and renal function trend. ICU management.   03/07/24: Improving renal indices. Good UO. Check serologies. Work up for rash. D/w pt's mother at bedside. Avoid nephrotoxic meds as possible. Continue IV hydration.   Recheck UA, Spot urine for protein/crea. ICU management.   03/08/24: Improving renal indices. To continue current meds. D/w pt's mother at bedside.  NATALIA: Prerenal azotemia, Septic ATN, NSAID nephropathy  Shock, Sepsis  Metabolic acidosis  Recent axillary abscess, s/p Drainage    03/06/24: Continue IV hydration. Add bicarb. Pressor support as needed. IV abx, ID follow up. No improvement in renal indices. Monitor vanco levels.   Avoid nephrotoxic meds as possible. Avoid ACEI, ARB, NSAIDs and IV contrast. Will follow electrolytes and renal function trend. ICU management.   03/07/24: Improving renal indices. Good UO. Check serologies. Work up for rash. D/w pt's mother at bedside. Avoid nephrotoxic meds as possible. Continue IV hydration.   Recheck UA, Spot urine for protein/crea. ICU management.   03/08/24: Improving renal indices. To continue current meds. Vitamin D Rx. D/w pt's mother at bedside.

## 2024-03-08 NOTE — PROGRESS NOTE ADULT - SUBJECTIVE AND OBJECTIVE BOX
Patient is a 23y old  Male who presents with a chief complaint of Sepsis and NATALIA (08 Mar 2024 12:59)      INTERVAL HPI/OVERNIGHT EVENTS: Patient seen and examined at bedside. No overnight events.  ICU downgrade  Reports improvement in overall symptoms, feels better  rash improved    MEDICATIONS  (STANDING):  doxycycline monohydrate Capsule 100 milliGRAM(s) Oral every 12 hours  ergocalciferol 97247 Unit(s) Oral once  FIRST- Mouthwash  BLM 10 milliLiter(s) Swish and Spit four times a day  lactated ringers. 1000 milliLiter(s) (125 mL/Hr) IV Continuous <Continuous>  levoFLOXacin  Tablet 750 milliGRAM(s) Oral every 24 hours  lidocaine   4% Patch 2 Patch Transdermal every 24 hours  multivitamin/minerals 1 Tablet(s) Oral daily  pantoprazole  Injectable 40 milliGRAM(s) IV Push every 12 hours    MEDICATIONS  (PRN):  acetaminophen     Tablet .. 650 milliGRAM(s) Oral every 6 hours PRN Temp greater or equal to 38C (100.4F)  acetaminophen     Tablet .. 650 milliGRAM(s) Oral every 6 hours PRN Mild Pain (1 - 3)  melatonin 3 milliGRAM(s) Oral at bedtime PRN Insomnia  ondansetron Injectable 4 milliGRAM(s) IV Push every 4 hours PRN Nausea and/or Vomiting  traMADol 25 milliGRAM(s) Oral every 6 hours PRN Severe Pain (7 - 10)      Allergies    No Known Allergies    Intolerances        REVIEW OF SYSTEMS:  CONSTITUTIONAL: No fever or chills  CARDIOVASCULAR: No chest pain, palpitations  GASTROINTESTINAL: No abd pain, nausea, vomiting, or diarrhea      Vital Signs Last 24 Hrs  T(C): 36.6 (08 Mar 2024 11:35), Max: 36.8 (07 Mar 2024 20:14)  T(F): 97.8 (08 Mar 2024 11:35), Max: 98.2 (07 Mar 2024 20:14)  HR: 90 (08 Mar 2024 11:35) (89 - 120)  BP: 121/83 (08 Mar 2024 11:35) (100/68 - 132/58)  BP(mean): 85 (08 Mar 2024 05:00) (85 - 95)  RR: 18 (08 Mar 2024 11:35) (18 - 41)  SpO2: 91% (08 Mar 2024 11:35) (89% - 99%)    Parameters below as of 08 Mar 2024 11:35  Patient On (Oxygen Delivery Method): room air      I&O's Summary    07 Mar 2024 07:01  -  08 Mar 2024 07:00  --------------------------------------------------------  IN: 2600 mL / OUT: 3100 mL / NET: -500 mL      BMI (kg/m2): 32.5 (03-06-24 @ 01:10)    PHYSICAL EXAM:  GENERAL: NAD  HEENT:  AT/NC, anicteric, moist mucous membranes, EOMI, PERRL, no lid-lag, conjunctiva and sclera clear  CHEST/LUNG:  CTA b/l, no rales, wheezes, or rhonchi,  normal respiratory effort, no intercostal retractions  HEART:  RRR, S1, S2, no murmurs; no pitting edema  ABDOMEN:  BS+, soft, nontender, nondistended  MSK/EXTREMITIES: palpable peripheral pulses, no clubbing or cyanosis; diffuse rash on extremities improving/resolving   NERVOUS SYSTEM: answers questions and follows commands appropriately, A&Ox3 grossly moves all extremities   PSYCH: Appropriate affect, Alert & Awake; Good judgement                LABS: Personally reviewed  CBC                        11.7   27.65 )-----------( 66       ( 08 Mar 2024 04:58 )             32.5     CMP  03-08    143  |  109  |  51  ----------------------------<  109  4.0   |  27  |  2.10    Ca    8.5      08 Mar 2024 04:58  Phos  3.0     03-08  Mg     2.5     03-08    TPro  5.6  /  Alb  1.8  /  TBili  0.7  /  DBili  x   /  AST  28  /  ALT  47  /  AlkPhos  115  03-08      Lipase: 53 U/L (03-05-24 @ 12:42)      PT/INR - ( 06 Mar 2024 09:38 )   PT: 16.1 sec;   INR: 1.39 ratio         PTT - ( 06 Mar 2024 09:38 )  PTT:43.0 sec  Lactate, Blood: 1.9 mmol/L (03-07 @ 04:37)  Lactate, Blood: 4.2 mmol/L (03-06 @ 04:30)  Lactate, Blood: 4.5 mmol/L (03-06 @ 00:46)  Lactate, Blood: 5.8 mmol/L (03-05 @ 20:14)          03-06 Chol -- LDL -- HDL -- Trig 474 mg/dL      ABG - ( 07 Mar 2024 06:08 )  pH, Arterial: 7.42  pH, Blood: x     /  pCO2: 34    /  pO2: 77    / HCO3: 22    / Base Excess: -2.4  /  SaO2: 96.8                        Urinalysis Basic - ( 08 Mar 2024 04:58 )    Color: x / Appearance: x / SG: x / pH: x  Gluc: 109 mg/dL / Ketone: x  / Bili: x / Urobili: x   Blood: x / Protein: x / Nitrite: x   Leuk Esterase: x / RBC: x / WBC x   Sq Epi: x / Non Sq Epi: x / Bacteria: x        Culture - Urine (collected 06 Mar 2024 05:00)  Source: Clean Catch Clean Catch (Midstream)  Final Report (07 Mar 2024 09:38):    No growth    Culture - Blood (collected 05 Mar 2024 14:45)  Source: .Blood Blood  Preliminary Report (07 Mar 2024 23:02):    No growth at 48 Hours    Culture - Blood (collected 05 Mar 2024 14:33)  Source: .Blood Blood  Preliminary Report (07 Mar 2024 23:02):    No growth at 48 Hours            Culture - Urine (collected 03-06-24 @ 05:00)  Source: Clean Catch Clean Catch (Midstream)  Final Report (03-07-24 @ 09:38):    No growth    Culture - Blood (collected 03-05-24 @ 14:45)  Source: .Blood Blood  Preliminary Report (03-07-24 @ 23:02):    No growth at 48 Hours    Culture - Blood (collected 03-05-24 @ 14:33)  Source: .Blood Blood  Preliminary Report (03-07-24 @ 23:02):    No growth at 48 Hours        RADIOLOGY & ADDITIONAL TESTS: Personally reviewed.     Consultant(s) Notes Reviewed:  [x] YES  [ ] NO   Discussed with SW/CM, RN

## 2024-03-08 NOTE — PROGRESS NOTE ADULT - SUBJECTIVE AND OBJECTIVE BOX
S: Patient seen and examined at bedside, patient downgraded from ICU to floor yesterday. No acute overnight events. Patient reports no new complaints at this time, reports improvement in pain to the R axilla, and improvement in BLE rash. Admits to flatus, voiding, ambulating and tolerating liquids. Patient denies any fever, chills, chest pain, shortness of breath, nausea, vomiting, or urinary complaints.    MEDICATIONS:  acetaminophen     Tablet .. 650 milliGRAM(s) Oral every 6 hours PRN  acetaminophen     Tablet .. 650 milliGRAM(s) Oral every 6 hours PRN  cefTRIAXone   IVPB 2000 milliGRAM(s) IV Intermittent every 12 hours  doxycycline IVPB 100 milliGRAM(s) IV Intermittent every 12 hours  FIRST- Mouthwash  BLM 10 milliLiter(s) Swish and Spit four times a day  heparin   Injectable 5000 Unit(s) SubCutaneous every 8 hours  lactated ringers. 1000 milliLiter(s) IV Continuous <Continuous>  lidocaine   4% Patch 2 Patch Transdermal every 24 hours  melatonin 3 milliGRAM(s) Oral at bedtime PRN  multivitamin/minerals 1 Tablet(s) Oral daily  ondansetron Injectable 4 milliGRAM(s) IV Push every 4 hours PRN  pantoprazole  Injectable 40 milliGRAM(s) IV Push every 12 hours  traMADol 25 milliGRAM(s) Oral every 6 hours PRN      O:  Vital Signs Last 24 Hrs  T(C): 36.7 (08 Mar 2024 00:08), Max: 36.8 (07 Mar 2024 20:14)  T(F): 98 (08 Mar 2024 00:08), Max: 98.2 (07 Mar 2024 20:14)  HR: 98 (08 Mar 2024 05:00) (89 - 120)  BP: 132/58 (08 Mar 2024 05:00) (100/68 - 132/58)  BP(mean): 85 (08 Mar 2024 05:00) (81 - 102)  RR: 28 (08 Mar 2024 05:00) (21 - 41)  SpO2: 96% (08 Mar 2024 05:00) (89% - 99%)    PHYSICAL EXAM:  GENERAL: No acute distress, lying comfortably in bed  HEAD:  Atraumatic, Normocephalic  CHEST/LUNG: Non labored respirations, no accessory muscle use  HEART: Regular rate and rhythm  ABDOMEN: Soft, non-tender, non-distended; no mcburney's point tenderness, no rebound tenderness no guarding  SKIN: L axilla with superomedial aspect with subcuteanous mass measuring about 1-2x1-2cm with associated 1cm palpable tract tender to palpation, no surrounding induration, no surrounding erythema, no drainage. LLE with large macular erythematous patch of the dorsal aspect of foot with central clearing. BLE below the knee with small erythematous hemangiomas no excoriations appreciated  EXT: calves non-tender b/l, no edema  NEUROLOGY: A&O x 3, no focal deficits    I&O SUMMARY:    03-07-24 @ 07:01  -  03-08-24 @ 07:00  --------------------------------------------------------  IN:    dextrose 5% + sodium chloride 0.45% w/ Additives: 200 mL    IV PiggyBack: 50 mL    IV PiggyBack: 250 mL    IV PiggyBack: 100 mL    Lactated Ringers: 2000 mL  Total IN: 2600 mL    OUT:    Phenylephrine: 0 mL    Voided (mL): 3100 mL  Total OUT: 3100 mL    Total NET: -500 mL    LABS:                        11.7   27.65 )-----------( 66       ( 08 Mar 2024 04:58 )             32.5     03-08    143  |  109<H>  |  51<H>  ----------------------------<  109<H>  4.0   |  27  |  2.10<H>    Ca    8.5      08 Mar 2024 04:58  Phos  3.0     03-08  Mg     2.5     03-08    TPro  5.6<L>  /  Alb  1.8<L>  /  TBili  0.7  /  DBili  x   /  AST  28  /  ALT  47  /  AlkPhos  115  03-08    PT/INR - ( 06 Mar 2024 09:38 )   PT: 16.1 sec;   INR: 1.39 ratio         PTT - ( 06 Mar 2024 09:38 )  PTT:43.0 sec      Lactate, Blood: 1.9 mmol/L (03-07 @ 04:37)  Lactate, Blood: 4.2 mmol/L (03-06 @ 04:30)  Lactate, Blood: 4.5 mmol/L (03-06 @ 00:46)  Lactate, Blood: 5.8 mmol/L (03-05 @ 20:14)  Lactate, Blood: 8.6 mmol/L (03-05 @ 12:42)    RADIOLOGY:  < from: US Duplex Venous Lower Ext Complete, Bilateral (03.07.24 @ 12:02) >  IMPRESSION:  No evidence of deep venous thrombosis in either lower extremity.    --- End of Report ---    < end of copied text >    < from: US Axilla Only, Left (03.06.24 @ 08:19) >  IMPRESSION:    Small heterogeneous subcutaneous fluid collection in the left axilla   region as discussed, which may correspond to abscess.    --- End of Report ---    < end of copied text >    < from: CT Abdomen and Pelvis No Cont (03.05.24 @ 15:51) >  IMPRESSION:  No evidence for pulmonary consolidation or infiltrate.    Mildly enlarged left axillary lymph node    Appendix slightly thickened measuring 8 mm in caliber without adjacent   stranding. Appendicolith in the proximal appendix. Findings are equivocal   for acute appendicitis. Clinical correlation and continued clinical   observation are recommended.    Apparent focal distal esophageal mural thickening. EGD may be pursued for   further evaluation.    --- End of Report ---      < end of copied text >     S: Patient seen and examined at bedside, patient downgraded from ICU to floor yesterday. No acute overnight events. Patient reports no new complaints at this time, reports improvement in pain to the R axilla, and improvement in BLE rash. Admits to flatus, voiding, ambulating and tolerating liquids. Patient denies any fever, chills, chest pain, shortness of breath, nausea, vomiting, or urinary complaints.    MEDICATIONS:  acetaminophen     Tablet .. 650 milliGRAM(s) Oral every 6 hours PRN  acetaminophen     Tablet .. 650 milliGRAM(s) Oral every 6 hours PRN  cefTRIAXone   IVPB 2000 milliGRAM(s) IV Intermittent every 12 hours  doxycycline IVPB 100 milliGRAM(s) IV Intermittent every 12 hours  FIRST- Mouthwash  BLM 10 milliLiter(s) Swish and Spit four times a day  heparin   Injectable 5000 Unit(s) SubCutaneous every 8 hours  lactated ringers. 1000 milliLiter(s) IV Continuous <Continuous>  lidocaine   4% Patch 2 Patch Transdermal every 24 hours  melatonin 3 milliGRAM(s) Oral at bedtime PRN  multivitamin/minerals 1 Tablet(s) Oral daily  ondansetron Injectable 4 milliGRAM(s) IV Push every 4 hours PRN  pantoprazole  Injectable 40 milliGRAM(s) IV Push every 12 hours  traMADol 25 milliGRAM(s) Oral every 6 hours PRN      O:  Vital Signs Last 24 Hrs  T(C): 36.7 (08 Mar 2024 00:08), Max: 36.8 (07 Mar 2024 20:14)  T(F): 98 (08 Mar 2024 00:08), Max: 98.2 (07 Mar 2024 20:14)  HR: 98 (08 Mar 2024 05:00) (89 - 120)  BP: 132/58 (08 Mar 2024 05:00) (100/68 - 132/58)  BP(mean): 85 (08 Mar 2024 05:00) (81 - 102)  RR: 28 (08 Mar 2024 05:00) (21 - 41)  SpO2: 96% (08 Mar 2024 05:00) (89% - 99%)    PHYSICAL EXAM:  GENERAL: No acute distress, lying comfortably in bed  HEAD:  Atraumatic, Normocephalic  CHEST/LUNG: Non labored respirations, no accessory muscle use  HEART: Regular rate and rhythm  ABDOMEN: Soft, non-tender, non-distended; no mcburney's point tenderness, no rebound tenderness no guarding  SKIN: L axilla with superomedial aspect with subcuteanous mass measuring about 1-2x1-2cm with associated 1cm palpable tract tender to palpation, no surrounding induration, no surrounding erythema, no drainage. LLE with large macular erythematous patch of the dorsal aspect of foot with central clearing. BLE below the knee with small erythematous hemangiomas no excoriations appreciated  EXT: calves non-tender b/l, no edema  NEUROLOGY: A&O x 3, no focal deficits    I&O SUMMARY:    03-07-24 @ 07:01  -  03-08-24 @ 07:00  --------------------------------------------------------  IN:    dextrose 5% + sodium chloride 0.45% w/ Additives: 200 mL    IV PiggyBack: 50 mL    IV PiggyBack: 250 mL    IV PiggyBack: 100 mL    Lactated Ringers: 2000 mL  Total IN: 2600 mL    OUT:    Phenylephrine: 0 mL    Voided (mL): 3100 mL  Total OUT: 3100 mL    Total NET: -500 mL    LABS:                        11.7   27.65 )-----------( 66       ( 08 Mar 2024 04:58 )             32.5     03-08    143  |  109<H>  |  51<H>  ----------------------------<  109<H>  4.0   |  27  |  2.10<H>    Ca    8.5      08 Mar 2024 04:58  Phos  3.0     03-08  Mg     2.5     03-08    TPro  5.6<L>  /  Alb  1.8<L>  /  TBili  0.7  /  DBili  x   /  AST  28  /  ALT  47  /  AlkPhos  115  03-08    PT/INR - ( 06 Mar 2024 09:38 )   PT: 16.1 sec;   INR: 1.39 ratio         PTT - ( 06 Mar 2024 09:38 )  PTT:43.0 sec      Lactate, Blood: 1.9 mmol/L (03-07 @ 04:37)  Lactate, Blood: 4.2 mmol/L (03-06 @ 04:30)  Lactate, Blood: 4.5 mmol/L (03-06 @ 00:46)  Lactate, Blood: 5.8 mmol/L (03-05 @ 20:14)  Lactate, Blood: 8.6 mmol/L (03-05 @ 12:42)    Culture - Blood (03.05.24 @ 14:45)    Specimen Source: .Blood Blood   Culture Results:   No growth at 48 Hours    RADIOLOGY:  < from: US Duplex Venous Lower Ext Complete, Bilateral (03.07.24 @ 12:02) >  IMPRESSION:  No evidence of deep venous thrombosis in either lower extremity.    --- End of Report ---    < end of copied text >    < from: US Axilla Only, Left (03.06.24 @ 08:19) >  IMPRESSION:    Small heterogeneous subcutaneous fluid collection in the left axilla   region as discussed, which may correspond to abscess.    --- End of Report ---    < end of copied text >    < from: CT Abdomen and Pelvis No Cont (03.05.24 @ 15:51) >  IMPRESSION:  No evidence for pulmonary consolidation or infiltrate.    Mildly enlarged left axillary lymph node    Appendix slightly thickened measuring 8 mm in caliber without adjacent   stranding. Appendicolith in the proximal appendix. Findings are equivocal   for acute appendicitis. Clinical correlation and continued clinical   observation are recommended.    Apparent focal distal esophageal mural thickening. EGD may be pursued for   further evaluation.    --- End of Report ---      < end of copied text >

## 2024-03-08 NOTE — PHYSICAL THERAPY INITIAL EVALUATION ADULT - PERTINENT HX OF CURRENT PROBLEM, REHAB EVAL
24 yo male with no significant pmh presents for fever, chills, nausea for >3 days.    Patient states that since Saturday, he has been having significant rigors, fevers, and has been feeling unwell. He had a left axiliary node drainage a few days ago by urgent care, after noticing that he had a skin infection with purulent drainage located near his left axilla. He was started on doxycyline outpatient for the skin infection, but has had persistent fevers. His last temperature was 102.6F and he alternated between Tylenol and ibuprofen but was not able to get his temperature down. He has a history of multiple skin infections and has tolerated doxycyline in the past. Patient also endorses a generalized skin rash since Saturday, nausea with 2 episodes of NBNB emesis, and loose stool. He also endorses generalized abdominal pain.    Of note, patient also complains of left shoulder pain and rates it 7/10. He states he has history of prior dislocations and movement of the shoulder increases pain.

## 2024-03-09 ENCOUNTER — TRANSCRIPTION ENCOUNTER (OUTPATIENT)
Age: 24
End: 2024-03-09

## 2024-03-09 VITALS
OXYGEN SATURATION: 92 % | DIASTOLIC BLOOD PRESSURE: 85 MMHG | TEMPERATURE: 98 F | SYSTOLIC BLOOD PRESSURE: 134 MMHG | HEART RATE: 83 BPM | RESPIRATION RATE: 18 BRPM

## 2024-03-09 LAB
ANION GAP SERPL CALC-SCNC: 10 MMOL/L — SIGNIFICANT CHANGE UP (ref 5–17)
BABESIA MICROTI PCR, BLD RESULT: SIGNIFICANT CHANGE UP
BUN SERPL-MCNC: 31 MG/DL — HIGH (ref 7–23)
CALCIUM SERPL-MCNC: 8.5 MG/DL — SIGNIFICANT CHANGE UP (ref 8.5–10.1)
CHLORIDE SERPL-SCNC: 105 MMOL/L — SIGNIFICANT CHANGE UP (ref 96–108)
CO2 SERPL-SCNC: 27 MMOL/L — SIGNIFICANT CHANGE UP (ref 22–31)
CREAT SERPL-MCNC: 1.1 MG/DL — SIGNIFICANT CHANGE UP (ref 0.5–1.3)
EGFR: 96 ML/MIN/1.73M2 — SIGNIFICANT CHANGE UP
GLUCOSE SERPL-MCNC: 99 MG/DL — SIGNIFICANT CHANGE UP (ref 70–99)
HCT VFR BLD CALC: 38.9 % — LOW (ref 39–50)
HEPARIN-PF4 AB RESULT: <0.6 U/ML — SIGNIFICANT CHANGE UP (ref 0–0.9)
HGB BLD-MCNC: 13.4 G/DL — SIGNIFICANT CHANGE UP (ref 13–17)
MCHC RBC-ENTMCNC: 29 PG — SIGNIFICANT CHANGE UP (ref 27–34)
MCHC RBC-ENTMCNC: 34.4 GM/DL — SIGNIFICANT CHANGE UP (ref 32–36)
MCV RBC AUTO: 84.2 FL — SIGNIFICANT CHANGE UP (ref 80–100)
NRBC # BLD: SIGNIFICANT CHANGE UP /100 WBCS (ref 0–0)
PF4 HEPARIN CMPLX AB SER-ACNC: NEGATIVE — SIGNIFICANT CHANGE UP
PLATELET # BLD AUTO: 90 K/UL — LOW (ref 150–400)
POTASSIUM SERPL-MCNC: 4.3 MMOL/L — SIGNIFICANT CHANGE UP (ref 3.5–5.3)
POTASSIUM SERPL-SCNC: 4.3 MMOL/L — SIGNIFICANT CHANGE UP (ref 3.5–5.3)
RBC # BLD: 4.62 M/UL — SIGNIFICANT CHANGE UP (ref 4.2–5.8)
RBC # FLD: 13.5 % — SIGNIFICANT CHANGE UP (ref 10.3–14.5)
SODIUM SERPL-SCNC: 142 MMOL/L — SIGNIFICANT CHANGE UP (ref 135–145)
WBC # BLD: 22.2 K/UL — HIGH (ref 3.8–10.5)
WBC # FLD AUTO: 22.2 K/UL — HIGH (ref 3.8–10.5)

## 2024-03-09 PROCEDURE — 93005 ELECTROCARDIOGRAM TRACING: CPT

## 2024-03-09 PROCEDURE — 86022 PLATELET ANTIBODIES: CPT

## 2024-03-09 PROCEDURE — 85730 THROMBOPLASTIN TIME PARTIAL: CPT

## 2024-03-09 PROCEDURE — 82570 ASSAY OF URINE CREATININE: CPT

## 2024-03-09 PROCEDURE — 93306 TTE W/DOPPLER COMPLETE: CPT

## 2024-03-09 PROCEDURE — 85027 COMPLETE CBC AUTOMATED: CPT

## 2024-03-09 PROCEDURE — 36415 COLL VENOUS BLD VENIPUNCTURE: CPT

## 2024-03-09 PROCEDURE — 99285 EMERGENCY DEPT VISIT HI MDM: CPT | Mod: 25

## 2024-03-09 PROCEDURE — 99232 SBSQ HOSP IP/OBS MODERATE 35: CPT

## 2024-03-09 PROCEDURE — 85025 COMPLETE CBC W/AUTO DIFF WBC: CPT

## 2024-03-09 PROCEDURE — 74176 CT ABD & PELVIS W/O CONTRAST: CPT | Mod: MC

## 2024-03-09 PROCEDURE — 96374 THER/PROPH/DIAG INJ IV PUSH: CPT

## 2024-03-09 PROCEDURE — 84156 ASSAY OF PROTEIN URINE: CPT

## 2024-03-09 PROCEDURE — 80061 LIPID PANEL: CPT

## 2024-03-09 PROCEDURE — 82550 ASSAY OF CK (CPK): CPT

## 2024-03-09 PROCEDURE — 94660 CPAP INITIATION&MGMT: CPT

## 2024-03-09 PROCEDURE — 87086 URINE CULTURE/COLONY COUNT: CPT

## 2024-03-09 PROCEDURE — 83735 ASSAY OF MAGNESIUM: CPT

## 2024-03-09 PROCEDURE — 86160 COMPLEMENT ANTIGEN: CPT

## 2024-03-09 PROCEDURE — 84100 ASSAY OF PHOSPHORUS: CPT

## 2024-03-09 PROCEDURE — 85610 PROTHROMBIN TIME: CPT

## 2024-03-09 PROCEDURE — 86753 PROTOZOA ANTIBODY NOS: CPT

## 2024-03-09 PROCEDURE — 83516 IMMUNOASSAY NONANTIBODY: CPT

## 2024-03-09 PROCEDURE — 80053 COMPREHEN METABOLIC PANEL: CPT

## 2024-03-09 PROCEDURE — 86036 ANCA SCREEN EACH ANTIBODY: CPT

## 2024-03-09 PROCEDURE — 83690 ASSAY OF LIPASE: CPT

## 2024-03-09 PROCEDURE — 82436 ASSAY OF URINE CHLORIDE: CPT

## 2024-03-09 PROCEDURE — 87640 STAPH A DNA AMP PROBE: CPT

## 2024-03-09 PROCEDURE — 76770 US EXAM ABDO BACK WALL COMP: CPT

## 2024-03-09 PROCEDURE — 70450 CT HEAD/BRAIN W/O DYE: CPT | Mod: MC

## 2024-03-09 PROCEDURE — 85379 FIBRIN DEGRADATION QUANT: CPT

## 2024-03-09 PROCEDURE — 80202 ASSAY OF VANCOMYCIN: CPT

## 2024-03-09 PROCEDURE — 83036 HEMOGLOBIN GLYCOSYLATED A1C: CPT

## 2024-03-09 PROCEDURE — 71250 CT THORAX DX C-: CPT | Mod: MC

## 2024-03-09 PROCEDURE — 86431 RHEUMATOID FACTOR QUANT: CPT

## 2024-03-09 PROCEDURE — 84540 ASSAY OF URINE/UREA-N: CPT

## 2024-03-09 PROCEDURE — 76882 US LMTD JT/FCL EVL NVASC XTR: CPT

## 2024-03-09 PROCEDURE — 86038 ANTINUCLEAR ANTIBODIES: CPT

## 2024-03-09 PROCEDURE — 84133 ASSAY OF URINE POTASSIUM: CPT

## 2024-03-09 PROCEDURE — 87040 BLOOD CULTURE FOR BACTERIA: CPT

## 2024-03-09 PROCEDURE — 85384 FIBRINOGEN ACTIVITY: CPT

## 2024-03-09 PROCEDURE — 82306 VITAMIN D 25 HYDROXY: CPT

## 2024-03-09 PROCEDURE — 87389 HIV-1 AG W/HIV-1&-2 AB AG IA: CPT

## 2024-03-09 PROCEDURE — 87637 SARSCOV2&INF A&B&RSV AMP PRB: CPT

## 2024-03-09 PROCEDURE — 71045 X-RAY EXAM CHEST 1 VIEW: CPT

## 2024-03-09 PROCEDURE — 86666 EHRLICHIA ANTIBODY: CPT

## 2024-03-09 PROCEDURE — 99239 HOSP IP/OBS DSCHRG MGMT >30: CPT

## 2024-03-09 PROCEDURE — 84300 ASSAY OF URINE SODIUM: CPT

## 2024-03-09 PROCEDURE — 87641 MR-STAPH DNA AMP PROBE: CPT

## 2024-03-09 PROCEDURE — 83605 ASSAY OF LACTIC ACID: CPT

## 2024-03-09 PROCEDURE — 87798 DETECT AGENT NOS DNA AMP: CPT

## 2024-03-09 PROCEDURE — 81001 URINALYSIS AUTO W/SCOPE: CPT

## 2024-03-09 PROCEDURE — 84478 ASSAY OF TRIGLYCERIDES: CPT

## 2024-03-09 PROCEDURE — 82803 BLOOD GASES ANY COMBINATION: CPT

## 2024-03-09 PROCEDURE — 83935 ASSAY OF URINE OSMOLALITY: CPT

## 2024-03-09 PROCEDURE — 80048 BASIC METABOLIC PNL TOTAL CA: CPT

## 2024-03-09 PROCEDURE — 93970 EXTREMITY STUDY: CPT

## 2024-03-09 PROCEDURE — 86618 LYME DISEASE ANTIBODY: CPT

## 2024-03-09 PROCEDURE — 86668 FRANCISELLA TULARENSIS: CPT

## 2024-03-09 RX ORDER — PANTOPRAZOLE SODIUM 20 MG/1
1 TABLET, DELAYED RELEASE ORAL
Qty: 30 | Refills: 0
Start: 2024-03-09 | End: 2024-04-07

## 2024-03-09 RX ORDER — CHOLECALCIFEROL (VITAMIN D3) 125 MCG
1000 CAPSULE ORAL
Qty: 0 | Refills: 0 | DISCHARGE
Start: 2024-03-09

## 2024-03-09 RX ORDER — LEVOFLOXACIN 5 MG/ML
1 INJECTION, SOLUTION INTRAVENOUS
Qty: 5 | Refills: 0
Start: 2024-03-09 | End: 2024-03-13

## 2024-03-09 RX ORDER — MULTIVIT-MIN/FERROUS GLUCONATE 9 MG/15 ML
1 LIQUID (ML) ORAL
Qty: 0 | Refills: 0 | DISCHARGE
Start: 2024-03-09

## 2024-03-09 RX ADMIN — Medication 1000 UNIT(S): at 12:28

## 2024-03-09 RX ADMIN — Medication 100 MILLIGRAM(S): at 17:10

## 2024-03-09 RX ADMIN — Medication 1 TABLET(S): at 12:28

## 2024-03-09 RX ADMIN — Medication 30 MILLILITER(S): at 01:52

## 2024-03-09 RX ADMIN — PANTOPRAZOLE SODIUM 40 MILLIGRAM(S): 20 TABLET, DELAYED RELEASE ORAL at 17:10

## 2024-03-09 RX ADMIN — PANTOPRAZOLE SODIUM 40 MILLIGRAM(S): 20 TABLET, DELAYED RELEASE ORAL at 05:36

## 2024-03-09 RX ADMIN — DIPHENHYDRAMINE HYDROCHLORIDE AND LIDOCAINE HYDROCHLORIDE AND ALUMINUM HYDROXIDE AND MAGNESIUM HYDRO 10 MILLILITER(S): KIT at 12:28

## 2024-03-09 RX ADMIN — DIPHENHYDRAMINE HYDROCHLORIDE AND LIDOCAINE HYDROCHLORIDE AND ALUMINUM HYDROXIDE AND MAGNESIUM HYDRO 10 MILLILITER(S): KIT at 17:10

## 2024-03-09 RX ADMIN — Medication 100 MILLIGRAM(S): at 05:36

## 2024-03-09 RX ADMIN — DIPHENHYDRAMINE HYDROCHLORIDE AND LIDOCAINE HYDROCHLORIDE AND ALUMINUM HYDROXIDE AND MAGNESIUM HYDRO 10 MILLILITER(S): KIT at 05:36

## 2024-03-09 NOTE — PROGRESS NOTE ADULT - SUBJECTIVE AND OBJECTIVE BOX
Internal Medicine Teaching Service (IMTS)  Discharge Summary   Patient: Sha Nieto Discharge Date: 5/24/2019  [facility] Hospital: [facility]   YOB: 1983 Admission Date: 5/16/2019 [facility] JMS: Rodrick Nuñez   MRN: [id number] Admission Length: 8 Days    Admitting Physician: No att. providers found Discharge Physician: Ector Carranza MD  [facility] Team Color: ORANGE     Admission Information     Admission Diagnoses:   Elevated troponin level [R74.8]  Anemia, unspecified type [D64.9]  Chest pain, unspecified type [R07.9]  Systemic lupus erythematosus, unspecified SLE type, unspecified organ involvement status (CMS/[facility]) [M32.9]  Acute renal failure superimposed on chronic kidney disease, unspecified CKD stage, unspecified acute renal failure type (CMS/HCC) [N17.9, N18.9] Primary Discharge Diagnoses:     Systemic Lupus erythematosus   Lupus Nephritis   Acute Renal Failure Superimposed on CKD  Hypertrophic Obstructive Cardiomyopathy    Secondary Discharge Diagnoses:   Patient Active Problem List   Diagnosis   • SLE (systemic lupus erythematosus) (CMS/[facility])   • Proteinuria   • Benign hypertension   • Hypothyroidism   • Lupus nephritis (CMS/[facility])   • Chronic gout of multiple sites   • Hyperlipidemia   • Arthritis   • Tobacco use   • Candidal intertrigo   • Menorrhagia with irregular cycle   • MUSTAPHA III (cervical intraepithelial neoplasia III)   • Vitamin D deficiency   • Elevated glycohemoglobin   • Tongue mass   • Tongue swelling   • CKD (chronic kidney disease) stage 3, GFR 30-59 ml/min (CMS/[facility])        LACE Score: LACE Score: 12  30 day Readmission: No  PPQ Completed: NA  RCA Completed: NA  D/C Appt. Scheduled in [facility]: Yes Admission Condition: Stable    Discharged Condition: Good    Disposition: Home     Hospital Course   Sha Nieto is a 36 year old female with a PMH significant for SLE, lupus nephritis, and CKD who presented with w/ chest pain, shortness of breath, and generalized weakness. The  patient also reported a subjective weight gain in the past 2 weeks prior to admission with increase in lower extremity swelling. The patient's initial work up in the ED showed worsening anemia with a hemoglobin of 7.3 from 9.5 within 2 weeks, creatinine of 3.61 (baseline 1.2-1.4), hyperkalemia at 5.2, elevated troponin at 0.2 and elevated BNP at +11,000. Urinalysis showed an elevated urine protein and protein:creatinine with a small amount of blood consistent with lupus nephritis. She was admitted for further cardiac work up as well as further management regarding her acute renal failure superimposed on CKD.    Cardiology and nephrology were consulted. The patient was started on a furosemide drip, after which she began to diurese with gradual improvement in her creatinine from 3.6 to 2.7 at discharge. The patient under went a stress echo which showed an EF of 71% and severe concentric LVH with decreased LV cavity size. There was resting LVOT obstruction with consideration of HCM vs infiltrative disease. A lexiscan perfusion scan was negative for regadenoson induced ischemia. Nuclear medicine cardiac amyloid imaging was performed prior to discharge, the results of which will be discussed with the patient at her next follow up appointment.     Upon discharge, nephrology's recommendations were to continue her PTA medications including bumex, spironolactone, and metolazone. She has a follow up appointment with Dr. Malone, her nephrologist, on 6/3.During admission, her PTA prednisone dose was increased from 10 to 20 mg. She was discharged home with continuation of cellcept 1500 bid, hydroxychloroquine 200 mg bid, and prednisone 20 mg qd.  From a cardiology standpoint, the patient was started on metoprolol during admission after discovery of hypertrophic obstructive cardiomyopathy on echocardiogram at an initial dose of 50 mg. She was weaned down from 0.2 clonidine to 0.1. At discharge she was continued on 0.1 clonidine and  increased to 75 metoprolol bid. She has a follow up appointment on 6/10 with Dr. Henderson.       Consultants:  IP Consult Orders (From admission, onward)    Start     Ordered    05/18/19 1010  Inpatient consult to Cardiology  ONE TIME     Comments:  Will call tomorrow.  No rush to see this evening   Provider:  Jhonny Brandon MD    05/18/19 1020    05/16/19 1438  Inpatient consult to Nephrology  ONE TIME     Provider:  Felicita English MD    05/16/19 1437          Physical Exam     Visit Vitals  BP (!) 160/98 (BP Location: [facility], Patient Position: Semi-Velasquez's)   Pulse 80   Temp 98 °F (36.7 °C) (Oral)   Resp 18   Ht 5' 6\" (1.676 m)   Wt (!) 137.9 kg   LMP 09/02/2017   SpO2 98%   BMI 49.08 kg/m²     Wt Readings from Last 1 Encounters:   05/24/19 (!) 137.9 kg        Constitutional: NAD, normal appearing female. Obese. Hirsutism present.  HEENT: PERRL, no scleral icterus or conjunctival pallor, no nasal discharge, oropharynx without erythema or exudate.  Cardiovascular: RRR, normal S1 S2, 2/6 systolic murmur best heard over the LSB, mild peripheral edema bilat, no JVD.  Respiratory: No retractions or increased work of breathing, clear to auscultation bilaterally.  Gastrointestinal: obese abd, +BS, soft, non-tender, non-distended, no organomegaly.  Musculoskeletal: Using all extremities equally. No cyanosis.  Skin: No rashes, jaundice or other lesions.  Neurologic: A&Ox3, EOMI. No gross motor or sensory deficits. No facial droop or dysarthria.  Psychiatric: Affect and mood appropriate. The patient is alert, interactive, appropriate.    Recommendations - Instructions - Follow-up   Special Recommendations (DVT hx/ INR range/ Stent hx/ Anti-coagulation/ Coagulopathies/ Bleeding Disorder hx/ etc.):    NA    Diet:   Activity:  Activity as Tolerated Wound Care: None Needed     Unresulted:  Unresulted Labs (From admission, onward)    Start     Ordered    05/23/19 0821  CBC No Differential  AM DRAW,   AMDR      05/23/19 0820    05/22/19  1136  Prepare red blood cells  ONE TIME,   Today      05/22/19 1135        Unresulted Procedure (From admission, onward)    Start     Ordered    05/21/19 0859  NM Amyloid Cardiac Imaging  1 TIME IMAGING,   Routine      05/21/19 0858    05/16/19 1935  US Renal Complete (Comp Urinary System)  1 TIME IMAGING,   Routine      05/16/19 1934          Follow-up Providers/Appointments:  Marry English MD  [address]  [facility] [address]  [phone]    Go on 5/31/2019  [facility] Discharge Appointment @ 12:40 P.M    Jhonny Brandon MD  [address]  [address]  [facility] [address]  [phone]    In 2 weeks        Medications        Summary of your Discharge Medications      Take these Medications      Details   acetaminophen 500 MG tablet  Commonly known as:  TYLENOL   Take 1,000 mg by mouth every 6 hours as needed for Pain.     allopurinol 100 MG tablet  Commonly known as:  ZYLOPRIM   Take 1/2 tablet by mouth daily.     aspirin 81 MG tablet   Take 1 tablet by mouth daily.     bumetanide 1 MG tablet  Commonly known as:  BUMEX  Notes to patient:  After 2 weeks medication dosage changes   Take 2 tablets by mouth 2 times daily.  Comment:  Take 2mg BID for 2 weeks then switch to 1mg BID     cloNIDine 0.1 MG tablet  Commonly known as:  CATAPRES   Take 1 tablet by mouth 2 times daily.     cyclobenzaprine 10 MG tablet  Commonly known as:  FLEXERIL   Take 1 tablet by mouth 3 times daily as needed for Muscle spasms.     hydroxychloroquine 200 MG tablet  Commonly known as:  PLAQUENIL   Take 1 tablet by mouth 2 times daily.     Iron 325 (65 Fe) MG Tab  Notes to patient:  Separate from levorythyroxine by at least two hours before and two hours after taking   Take 1 tablet by mouth daily.     levothyroxine 75 MCG tablet  Commonly known as:  SYNTHROID, LEVOTHROID  Notes to patient:  Separate from Iron by at least 2 hours before and 2 hours after   Take 3 tablets by mouth daily (before breakfast).     metolazone  [facility] -- Babatunde Parsons Pannella, Patel, Savella Goodger, Cohen  Office # [phone]      Follow Up:  Sepsis Freddy     Subjective/Observations:   Seen at bedside, sleeping laying flat on room air in no acute distress  dw rn at bedside, no events overnight       PAST MEDICAL & SURGICAL HISTORY:  No pertinent past medical history      No significant past surgical history          MEDICATIONS  (STANDING):  cholecalciferol 1000 Unit(s) Oral daily  doxycycline monohydrate Capsule 100 milliGRAM(s) Oral every 12 hours  FIRST- Mouthwash  BLM 10 milliLiter(s) Swish and Spit four times a day  lactated ringers. 1000 milliLiter(s) (125 mL/Hr) IV Continuous <Continuous>  levoFLOXacin  Tablet 750 milliGRAM(s) Oral every 24 hours  lidocaine   4% Patch 2 Patch Transdermal every 24 hours  multivitamin/minerals 1 Tablet(s) Oral daily  pantoprazole  Injectable 40 milliGRAM(s) IV Push every 12 hours    MEDICATIONS  (PRN):  acetaminophen     Tablet .. 650 milliGRAM(s) Oral every 6 hours PRN Temp greater or equal to 38C (100.4F)  acetaminophen     Tablet .. 650 milliGRAM(s) Oral every 6 hours PRN Mild Pain (1 - 3)  melatonin 3 milliGRAM(s) Oral at bedtime PRN Insomnia  ondansetron Injectable 4 milliGRAM(s) IV Push every 4 hours PRN Nausea and/or Vomiting  traMADol 25 milliGRAM(s) Oral every 6 hours PRN Severe Pain (7 - 10)      Allergies    No Known Allergies    Intolerances        Vital Signs Last 24 Hrs  T(C): 36.7 (09 Mar 2024 04:52), Max: 37.3 (08 Mar 2024 20:17)  T(F): 98 (09 Mar 2024 04:52), Max: 99.2 (08 Mar 2024 20:17)  HR: 63 (09 Mar 2024 04:52) (63 - 94)  BP: 142/68 (09 Mar 2024 04:52) (121/83 - 146/92)  BP(mean): --  RR: 18 (09 Mar 2024 04:52) (18 - 18)  SpO2: 99% (09 Mar 2024 04:52) (91% - 99%)    Parameters below as of 09 Mar 2024 04:52  Patient On (Oxygen Delivery Method): room air        I&O's Summary        PHYSICAL EXAM:  TELE: not on tele   Constitutional: NAD, awake and alert, well-developed  HEENT: Moist Mucous Membranes, Anicteric  Pulmonary: Non-labored, breath sounds are clear bilaterally, No wheezing, crackles or rhonchi  Cardiovascular: Regular, S1 and S2 nl, No murmurs, rubs, gallops or clicks  Gastrointestinal: Bowel Sounds present, soft, nontender.   Lymph: No lymphadenopathy. No peripheral edema.  Skin: No visible rashes or ulcers.  Psych:  Mood & affect appropriate    LABS: All Labs Reviewed:                           27.65 )-----------( 66       ( 08 Mar 2024 04:58 )             32.5                         11.7   29.51 )-----------( 100      ( 07 Mar 2024 04:37 )             32.6                         12.6   21.76 )-----------( 120      ( 06 Mar 2024 17:30 )             35.6     08 Mar 2024 04:58    143    |  109    |  51     ----------------------------<  109    4.0     |  27     |  2.10   07 Mar 2024 04:37    135    |  103    |  57     ----------------------------<  117    3.9     |  24     |  4.10   06 Mar 2024 17:30    131    |  100    |  55     ----------------------------<  132    4.3     |  20     |  4.80     Ca    8.5        08 Mar 2024 04:58  Ca    7.7        07 Mar 2024 04:37  Ca    7.3        06 Mar 2024 17:30  Phos  3.0       08 Mar 2024 04:58  Phos  3.1       07 Mar 2024 04:37  Phos  2.7       06 Mar 2024 17:30  Mg     2.5       08 Mar 2024 04:58  Mg     2.1       07 Mar 2024 04:37  Mg     1.9       06 Mar 2024 17:30    TPro  5.6    /  Alb  1.8    /  TBili  0.7    /  DBili  x      /  AST  28     /  ALT  47     /  AlkPhos  115    08 Mar 2024 04:58  TPro  5.4    /  Alb  1.8    /  TBili  1.7    /  DBili  x      /  AST  29     /  ALT  49     /  AlkPhos  79     07 Mar 2024 04:37             EC Lead ECG:   Ventricular Rate 135 BPM    Atrial Rate 135 BPM    P-R Interval 144 ms    QRS Duration 96 ms    Q-T Interval 286 ms    QTC Calculation(Bazett) 429 ms    P Axis 41 degrees    R Axis 63 degrees    T Axis -14 degrees    Diagnosis Line Sinus tachycardia  Nonspecific T wave abnormality  Abnormal ECG  When compared with ECG of 05-MAR-2024 22:56, (Unconfirmed)  Nonspecific T wave abnormality now evident in Anterior leads  Confirmed by GARCÍA PORRAS ([id number]) on 3/6/2024 9:47:01 PM (24 @ 09:01)      TRANSTHORACIC ECHOCARDIOGRAM REPORT  ________________________________________________________________________________                                      _______       Pt. Name:       GARY DONALDSON Study Date:    3/6/2024  MRN:            [id number] YOB: 2000  Accession #:    [id number]   Age:           23 years  Account#:       [id number]  Gender:        M  Heart Rate:                 Height:        67.72 in (172.00 cm)  Rhythm:                     Weight:        0.50 lb (0.23 kg)  Blood Pressure: 83/48 mmHg  BSA/BMI:       0.16 m² / 0.08 kg/m²  ________________________________________________________________________________________  Referring Physician:    [id number] Corey Juarez  Interpreting Physician: Octavio Fine  Primary Sonographer:    [facility]    CPT:               ECHO TTE WO CON COMP W DOPP - 78617.m  Indication(s):     Shock, unspecified - R57.9  Procedure:         Transthoracic echocardiogram with 2-D, M-mode and complete                     spectral and color flow Doppler.  Ordering Location: ICU1  Admission Status:  Inpatient    _______________________________________________________________________________________     CONCLUSIONS:      1. Left ventricular cavity is normal in size. Left ventricular systolic function is hyperdynamic with an ejection fraction visually estimated at 70 to 75 %.   2. The right ventricle is not well visualized. probably normal systolic function.   3. The left atrium is normal.   4. Structurally normal mitral valve with normal leaflet excursion.   5. Structurally normal tricuspid valve with normal leaflet excursion.   6. Trileaflet aortic valve with normal systolic excursion.   7. No pericardial effusion seen.   8. The patient is tachycardic with heart rates in the range of 130-135 beats per minute throughout this examination.    ________________________________________________________________________________________  FINDINGS:     Left Ventricle:  The left ventricular cavity is normal in size. Left ventricular systolic function is hyperdynamic with an ejection fraction visually estimated at 70 to 75%.     Right Ventricle:  The right ventricle is not well visualized. Probably normal systolic function.     Left Atrium:  The left atrium is normal.     Right Atrium:  The right atrium is normal in size.     Aortic Valve:  The aortic valve appears trileaflet with normal systolic excursion.     Mitral Valve:  Structurally normal mitral valve with normal leaflet excursion.     Tricuspid Valve:  Structurally normal tricuspid valve with normal leaflet excursion.     Pulmonic Valve:  Structurally normal pulmonic valve with normal leaflet excursion.     Pericardium:  No pericardial effusion seen.  ____________________________________________________________________  QUANTITATIVE DATA:  Left Ventricle Measurements: (Indexed to BSA)     IVSd (2D):   0.9 cm  LVPWd (2D):  0.8 cm  LVIDd (2D):  4.3 cm  LVIDs (2D):  2.3 cm  LV Mass:     119 g  747.6 g/m²  Visualized LV EF%: 70 to 75%     MV E Vmax:    0.80 m/s  MV A Vmax:    0.55 m/s  MV E/A:       1.46  e' lateral:   16.90 cm/s  e' medial:    15.60 cm/s  E/e' lateral: 4.76  E/e' medial:  5.16  E/e' Average: 4.95  MV DT:        116 msec    Aorta Measurements: (Normal range) (Indexed to BSA)     Sinuses of Valsalva: 2.60 cm (3.1 - 3.7 cm)       Left Atrium Measurements: (Indexed to BSA)  LA Diam 2D: 3.00 cm    Mitral Valve Measurements:     MV E Vmax: 0.8 m/s  MV A Vmax: 0.6 m/s  MV E/A:    1.5    ________________________________________________________________________________________  Electronically signed on 3/6/2024 at 1:23:31 PM by Octavio Fine         *** Final ***      Radiology:         10 MG tablet  Commonly known as:  ZAROXOLYN   Take 1 tablet by mouth 2 times daily.     metoPROLOL tartrate 75 MG Tab   Take 75 mg by mouth every 12 hours.     mycophenolate 500 MG tablet  Commonly known as:  CELLCEPT   Take 3 tablets by mouth 2 times daily.     oxyCODONE (IMM REL) 15 MG immediate release tablet  Commonly known as:  ROXICODONE   Take 15 mg by mouth every 4 hours as needed for Pain.     pravastatin 80 MG tablet  Commonly known as:  PRAVACHOL   Take 1 tablet by mouth nightly.     predniSONE 5 MG tablet  Commonly known as:  DELTASONE  Start taking on:  3/22/2019  Notes to patient:  Dosage changes often   Take 3 tablets by mouth 2 times daily (with meals) for 7 days, THEN 2.5 tablets 2 times daily (with meals) for 7 days, THEN 2 tablets 2 times daily (with meals) for 7 days, THEN 2 tablets daily.  Comment:  15 mg BID 1 week, 12.5 mg BID 1 week, 10 mg BID 1 week, then 10 mg /day afterwards. Taper management by nephrology clinic/dr anne on d/c     spironolactone 25 MG tablet  Commonly known as:  ALDACTONE   Take 1 tablet by mouth 2 times daily.     Vitamin D (Ergocalciferol) 98284 units capsule  Notes to patient:  1 time a week   Take 1 capsule by mouth once a week.  Comment:  Patient to take this for 8 weeks, then go to OTC Vitamin D 2,000 units.            CMS Best Practices - MI - HF - STROKE - PNA                  Quality Indicators     AMI With Heart Failure with Reduced LVEF (<40%)? no  Heart failure?  No  Stroke measures indicated? no  AMI? No  DVT/VTE Prophylaxis:  VTE Pharmacologic Prophylaxis: Yes  VTE Mechanical Prophylaxis: No mechanical VTE prophylaxis due to Patient fully ambulating and deemed to be low risk  Severe sepsis with septic shock?  No    ____________________________  Maurice Platt MD  PGY I  Pager #[phone]   5/24/2019   11:14 AM     This patient was discussed with attending physician Dr. Carranza. Please see below or additional addendum note for any further details.     Attending  Addendum:   AMTS Attending Staff Daily Progress Note:    I have personally interviewed and examined this patient, and was present for and supervised this resident service, have reviewed medical records and documentation and discussed this case with the team.  I agree with the history, physical exam and medical decision making as documented. Patient seen and examined 5/24/2019     Ector Carranza M.D ([place]).  Internal Medicine Faculty       [facility] DC Summary; © 2015. Dept. of Internal Medicine/ [facility]. Rev. 4.4; 3/27/15.   Support/ Technical Difficulties: [email or web address]

## 2024-03-09 NOTE — PROGRESS NOTE ADULT - PROBLEM SELECTOR PLAN 3
#hypomagnesemia  - replete   - f/u am mag  - keep mg >2    #Hyponatremia   continue IVF   - f/u am cmp
#hypomagnesemia  - replete   - f/u am mag  - keep mg >2    #Hyponatremia   continue IVF   - f/u am cmp
Could be dilutional in setting of IVF, no overt signs of bleeding  -Continue IV Protonix BID  -GI following, recommendations appreciated. Abd pain, n/v improving. Tolerating diet.   May need eventual colonoscopy
Could be dilutional in setting of IVF, no overt signs of bleeding  -Continue IV Protonix BID  -GI following, recommendations appreciated. Abd pain, n/v improving. Tolerating diet.   May need eventual colonoscopy

## 2024-03-09 NOTE — DISCHARGE NOTE NURSING/CASE MANAGEMENT/SOCIAL WORK - PATIENT PORTAL LINK FT
You can access the FollowMyHealth Patient Portal offered by [facility] by registering at the following website: [email or web address]. By joining [facility]’s FollowMyHealth portal, you will also be able to view your health information using other applications (apps) compatible with our system.

## 2024-03-09 NOTE — PROGRESS NOTE ADULT - SUBJECTIVE AND OBJECTIVE BOX
No CP, SOB    Vital Signs Last 24 Hrs  T(C): 36.7 (03-09-24 @ 11:41), Max: 36.7 (03-09-24 @ 04:52)  T(F): 98.1 (03-09-24 @ 11:41), Max: 98.1 (03-09-24 @ 11:41)  HR: 83 (03-09-24 @ 11:41) (63 - 83)  BP: 134/85 (03-09-24 @ 11:41) (134/85 - 142/68)  RR: 18 (03-09-24 @ 11:41) (18 - 18)  SpO2: 92% (03-09-24 @ 11:41) (92% - 99%)    Respiratory: b/l air entry  Cardiovascular: S1S2  Gastrointestinal: soft, ND  Extremities:  no edema                        13.4   22.20 )-----------( 90       ( 09 Mar 2024 17:00 )             38.9     09 Mar 2024 17:00    142    |  105    |  31     ----------------------------<  99     4.3     |  27     |  1.10     Ca    8.5        09 Mar 2024 17:00  Phos  3.0       08 Mar 2024 04:58  Mg     2.5       08 Mar 2024 04:58    TPro  5.6    /  Alb  1.8    /  TBili  0.7    /  DBili  x      /  AST  28     /  ALT  47     /  AlkPhos  115    08 Mar 2024 04:58    LIVER FUNCTIONS - ( 08 Mar 2024 04:58 )  Alb: 1.8 g/dL / Pro: 5.6 g/dL / ALK PHOS: 115 U/L / ALT: 47 U/L / AST: 28 U/L / GGT: x           acetaminophen     Tablet .. 650 milliGRAM(s) Oral every 6 hours PRN  acetaminophen     Tablet .. 650 milliGRAM(s) Oral every 6 hours PRN  cholecalciferol 1000 Unit(s) Oral daily  doxycycline monohydrate Capsule 100 milliGRAM(s) Oral every 12 hours  FIRST- Mouthwash  BLM 10 milliLiter(s) Swish and Spit four times a day  lactated ringers. 1000 milliLiter(s) IV Continuous <Continuous>  levoFLOXacin  Tablet 750 milliGRAM(s) Oral every 24 hours  lidocaine   4% Patch 2 Patch Transdermal every 24 hours  melatonin 3 milliGRAM(s) Oral at bedtime PRN  multivitamin/minerals 1 Tablet(s) Oral daily  ondansetron Injectable 4 milliGRAM(s) IV Push every 4 hours PRN  pantoprazole  Injectable 40 milliGRAM(s) IV Push every 12 hours  traMADol 25 milliGRAM(s) Oral every 6 hours PRN    Assessment and Plan:     Recent axillary abscess, s/p drainage  Adm w/NATALIA, w/ peak Cr - 4.8 on 3/5 and 3/6   Etiology not obvious  UA negative and imaging w/o hydro  NATALIA resolving w/IV hydration   Avoid nephrotoxins, no NSAID's  Heme/onc eval     [phone]

## 2024-03-09 NOTE — PROGRESS NOTE ADULT - SUBJECTIVE AND OBJECTIVE BOX
[facility]  Stefan Rayo PA-C  [address] [place] [address]  [phone]      INTERVAL HPI/OVERNIGHT EVENTS:  Pt s/e  No GI complaints    MEDICATIONS  (STANDING):  cholecalciferol 1000 Unit(s) Oral daily  doxycycline monohydrate Capsule 100 milliGRAM(s) Oral every 12 hours  FIRST- Mouthwash  BLM 10 milliLiter(s) Swish and Spit four times a day  lactated ringers. 1000 milliLiter(s) (125 mL/Hr) IV Continuous <Continuous>  levoFLOXacin  Tablet 750 milliGRAM(s) Oral every 24 hours  lidocaine   4% Patch 2 Patch Transdermal every 24 hours  multivitamin/minerals 1 Tablet(s) Oral daily  pantoprazole  Injectable 40 milliGRAM(s) IV Push every 12 hours    MEDICATIONS  (PRN):  acetaminophen     Tablet .. 650 milliGRAM(s) Oral every 6 hours PRN Temp greater or equal to 38C (100.4F)  acetaminophen     Tablet .. 650 milliGRAM(s) Oral every 6 hours PRN Mild Pain (1 - 3)  melatonin 3 milliGRAM(s) Oral at bedtime PRN Insomnia  ondansetron Injectable 4 milliGRAM(s) IV Push every 4 hours PRN Nausea and/or Vomiting  traMADol 25 milliGRAM(s) Oral every 6 hours PRN Severe Pain (7 - 10)      Allergies    No Known Allergies      PHYSICAL EXAM:   Vital Signs:  Vital Signs Last 24 Hrs  T(C): 36.7 (09 Mar 2024 04:52), Max: 37.3 (08 Mar 2024 20:17)  T(F): 98 (09 Mar 2024 04:52), Max: 99.2 (08 Mar 2024 20:17)  HR: 63 (09 Mar 2024 04:52) (63 - 94)  BP: 142/68 (09 Mar 2024 04:52) (121/83 - 146/92)  BP(mean): --  RR: 18 (09 Mar 2024 04:52) (18 - 18)  SpO2: 99% (09 Mar 2024 04:52) (91% - 99%)    Parameters below as of 09 Mar 2024 04:52  Patient On (Oxygen Delivery Method): room air      GENERAL:  Appears stated age  HEENT:  NC/AT  CHEST:  Full & symmetric excursion  HEART:  Regular rhythm  ABDOMEN:  Soft, non-tender, non-distended  EXTEREMITIES:  no cyanosis  SKIN:  No rash  NEURO:  Alert      LABS:                        11.7   27.65 )-----------( 66       ( 08 Mar 2024 04:58 )             32.5     03-08    143  |  109<H>  |  51<H>  ----------------------------<  109<H>  4.0   |  27  |  2.10<H>    Ca    8.5      08 Mar 2024 04:58  Phos  3.0     03-08  Mg     2.5     03-08    TPro  5.6<L>  /  Alb  1.8<L>  /  TBili  0.7  /  DBili  x   /  AST  28  /  ALT  47  /  AlkPhos  115  03-08      Urinalysis Basic - ( 08 Mar 2024 04:58 )    Color: x / Appearance: x / SG: x / pH: x  Gluc: 109 mg/dL / Ketone: x  / Bili: x / Urobili: x   Blood: x / Protein: x / Nitrite: x   Leuk Esterase: x / RBC: x / WBC x   Sq Epi: x / Non Sq Epi: x / Bacteria: x

## 2024-03-09 NOTE — PROGRESS NOTE ADULT - ASSESSMENT
24 yo male with no significant pmh presents for fever, chills, nausea for >3 days admitted for severe sepsis and jasmin likely ATN.

## 2024-03-09 NOTE — PROGRESS NOTE ADULT - ASSESSMENT
Abnormal imaging  Nausea/vomiting/diarrhea  Abdominal pain  Septic shock    CT noted and d/w patient and mother  PPI BID  Follow up stool studies if pt has diarrhea  Follow ID workup  Cont abx  Can consider egd as outpatient  Will follow    I reviewed the overnight course of events on the unit, re-confirming the patient history. I discussed the care with the patient  Differential diagnosis and plan of care discussed with patient after the evaluation  35 minutes spent on total encounter of which more than fifty percent of the encounter was spent counseling and/or coordinating care by the attending physician.

## 2024-03-09 NOTE — CONSULT NOTE ADULT - ASSESSMENT
[ASSESSMENT and  PLAN]  Thrombocytopenia  Sepsis    24yo M PMH of axilla hidradenitis   Admitted with severe sepsis to MICU  presents for fever, chills, nausea for >3 days   admitted for severe sepsis and NATALIA likely ATN.     Thrombocytopenia likely due to sepsis    incidental   Hepatic steatosis  Mildly enlarged left axillary lymph node    On Monday he went to urgent care and had a teaspoon of pus expressed from an abscess in his left axilla.      Appendix slightly thickened measuring 8 mm in caliber without adjacent stranding.   Appendicolith in the proximal appendix. Findings are equivocal for acute appendicitis. Clinical correlation and continued clinical observation are recommended.  Apparent focal distal esophageal mural thickening. EGD may be pursued for further evaluation.    s/p surgery eval  s/p ID eval  s/p MICU admission    Source of infection likely due to recent axilla infection    RECOMMENDATIONS    Thrombocytopenia  Repeat CBC in 1wk  monitor for sx and sn bleeding.     DVT Prophylaxis  OOB as monika or SQ Lovenox or SQ heparin    Discussed plan of care with patient and or family in detail.   Pt/Family expressed understanding of the treatment plan.   Risks, benefits and alternatives discussed in detail.   Opportunity given for questions and discussion.   Questions or concerns all addressed and answered to their satisfaction, and in lay terms.     Thank you for consulting us.

## 2024-03-09 NOTE — PROGRESS NOTE ADULT - ASSESSMENT
23 year old male with no medical history presents with fevers and nausea associated with rash and axillary lymphadenopathy. Had recent abscess from left axilla drained on Monday (3/4). Labs here demonstrate leukocytosis with bandemia, metabolic acidosis, and acute renal failure. Was admitted to ICU for further management of distributive shock state requiring vasopressor support.     Admitted with acute sepsis , FREDDY, Anemia sp ICU now downdgraded to the floor   -  tachycardia HR in 100s likely 2/2 to ongoing infectious process and distributive shock, now improving  - Patient is not complaining of any cardiac symptoms at this time.   - EKG shows sinus tachycardia 135bpm. No acute changes on EKG compared to previous.  - No meaningful evidence of volume overload.  - TTE 3/6/24 with normal LV systolic function with LVEF 70-75%.     - BP stable   - Off pressors and midodrine now.   - Monitor and replete lytes, keep K>4, Mg>2.  - BCx NGTD, Abx per ID    -Freddy on Ckd followed by renal     Sophie Pena FNP-C  Cardiology NP  [id number] [phone]

## 2024-03-09 NOTE — PROGRESS NOTE ADULT - PROBLEM SELECTOR PLAN 2
NATALIA (on CKD) likely 2/2 pre-renal azotemia in the setting of dehydration + doxycyline usage ?  suspect ATN from sepsis   - Baseline creatinine unknown  - Continue IVF  - Monitor BMP  - renal us ordered  - FeNa/FeUrea, UA  - Avoid nephrotoxic medications as able  - Nephrology (Dr. Patel) consulted, recommendations appreciated
NATALIA (on CKD) likely 2/2 pre-renal azotemia in the setting of dehydration + doxycyline usage ?  suspect ATN from sepsis   - Baseline creatinine unknown  - Continue IVF D5W + 0.45% saline with 75 mEq bicarbonate @ 100 cc/hr. Follow up with nephrology.   - Monitor BMP  - renal us ordered  - FeNa/FeUrea, UA  - Avoid nephrotoxic medications as able  - Nephrology (Dr. Patel) consulted, recommendations appreciated
NATALIA (on CKD) likely 2/2 suspect ATN from sepsis   - Baseline creatinine unknown  - Continue IVF  - Monitor BMP  - renal us ordered  - FeNa/FeUrea, UA  - Avoid nephrotoxic medications as able  - Nephrology (Dr. Patel) consulted, recommendations appreciated. Cr improving  - Monitor urine output
NTAALIA (on CKD) likely 2/2 suspect ATN from sepsis   - Baseline creatinine unknown  - Continue IVF  - Monitor BMP  - renal us ordered  - FeNa/FeUrea, UA  - Avoid nephrotoxic medications as able  - Nephrology (Dr. Patel) consulted, recommendations appreciated. Cr improving  - Monitor urine output

## 2024-03-09 NOTE — PROGRESS NOTE ADULT - NS ATTEND AMEND GEN_ALL_CORE FT
23 year old male with no medical history presents with fevers and nausea associated with rash and axillary lymphadenopathy. Had recent abscess from left axilla drained on Monday (3/4). Labs here demonstrate leukocytosis with bandemia, metabolic acidosis, and acute renal failure. Was admitted to ICU for further management of distributive shock state requiring vasopressor support.     Admitted with acute sepsis , NATALIA, Anemia sp ICU now downdgraded to the floor     reactive st in the setting of shock, improved  bp remains soft at times  he seems asx  echo with normal ef   abx per primary team.
23 year old male with no medical history presents with fevers and nausea associated with rash and axillary lymphadenopathy. Had recent abscess from left axilla drained on Monday (3/4). Labs here demonstrate leukocytosis with bandemia, metabolic acidosis, and acute renal failure. Was admitted to ICU for further management of distributive shock state requiring vasopressor support.     Admitted with acute sepsis , NATALIA, Anemia sp ICU now downdgraded to the floor     reactive st in the setting of shock, improved  bp remains soft at times  he seems asx  echo with normal ef   abx per primary team
Small left axillary abscess, spontaneously draining.  Approximately 1cm in size with 0.5 cm tract exiting skin in left axilla. Minimal erythema.  No overt cellulitis.  Formal I&D not indicated at this time.  Continue IV antibiotics.  Abdomen remain soft, nontender and nondistended. General condition remains guarded.  No clinical suspicion for acute appendicitis.  Once again, CT scan demonstrates 0.8cm appendix without periappendiceal stranding, abscess or perforation.    Lactate improving however Acute renal failure persists with elevated Cr.  WBC remains elevated.  F/u Blood cultures.  Continue IV antibiotics as per ID recommendations.  Continue ICU care

## 2024-03-09 NOTE — PROGRESS NOTE ADULT - PROBLEM SELECTOR PLAN 4
#hypomagnesemia  - replete   - f/u am mag  - keep mg >2    #Hyponatremia   continue IVF   - f/u am cmp
pt complaining of left shoulder pain, hx of dislocation  - s/w lidocaine patch
pt complaining of left shoulder pain, hx of dislocation  - s/w lidocaine patch
#hypomagnesemia  - replete   - f/u am mag  - keep mg >2    #Hyponatremia   continue IVF   - f/u am cmp

## 2024-03-09 NOTE — PROGRESS NOTE ADULT - PROBLEM SELECTOR PLAN 1
Patient meets severe sepsis criteria on admission: WBC >12,  >10% bands, HR >90,  ?likely 2/2 skin infection  -  ct a/p and chest performed  - Surgery following, recs appreciated, Patient is not a candidate for an appendectomy at this time. low suspicion for acute appendicitis   -GI PCR ordered, f/u results  - Continue IVF - Cr improving. Cleared from renal standpoint for dc planning  lactic acidosis resolving   - Trend WBC and monitor for fever - rising WBC could be from steroids now slowly downtrending   - Continue Tylenol 650mg for fever and mild pain q6  - Continue IV Rocephin and Doxycycline. ID Dr Pitts following recommendations appreciated - switched to PO Levofloxacin 750mg PO q24h until 3/14 to complete 10 days and doxycyline 100mg PO q12h until 3/19 to complete 14 days  -UCx, BCx x2 - no growth    #low vitamin D level   -started on Vit D 50,000 units x 1 and then vitamin d daily    Septic shock - hypotensive requiring pressors. Monitor blood pressures. Now downgraded from ICU off vasopressor support and Midodrine

## 2024-03-09 NOTE — CONSULT NOTE ADULT - CONSULT REQUESTED DATE/TIME
06-Mar-2024 15:01
05-Mar-2024 15:52
05-Mar-2024 15:18
09-Mar-2024 14:40
05-Mar-2024 18:30
07-Mar-2024 09:51
06-Mar-2024 02:29

## 2024-03-09 NOTE — CONSULT NOTE ADULT - CONSULT REASON
NATALIA
poss appendicitis
Sinus tachy
abnormal imaging  septic shock
Hypotension
axillary abscess
Thrombocytoepnia  Sepsis

## 2024-03-09 NOTE — DISCHARGE NOTE NURSING/CASE MANAGEMENT/SOCIAL WORK - NSDCPEFALRISK_GEN_ALL_CORE
For information on Fall & Injury Prevention, visit: [email or web address] OR  [email or web address] OR  https://www.cdc.gov/steadi/patient.html

## 2024-03-09 NOTE — PROGRESS NOTE ADULT - PROBLEM SELECTOR PROBLEM 2
NATALIA (acute kidney injury)

## 2024-03-09 NOTE — CONSULT NOTE ADULT - REASON FOR ADMISSION
Sepsis and NATALIA

## 2024-03-09 NOTE — CONSULT NOTE ADULT - SUBJECTIVE AND OBJECTIVE BOX
INCOMPLETE NOTE.  Documentation in Progress  PT SEEN AND EVALUATED.   FULL/ADDITIONAL RECOMMENDATIONS TO FOLLOW   ***************************************************************    Patient is a 23y old  Male who presents with a chief complaint of Sepsis and NATALIA (09 Mar 2024 10:46)      HPI:  24 yo male with no significant pmh presents for fever, chills, nausea for >3 days.    Patient states that since Saturday, he has been having significant rigors, fevers, and has been feeling unwell. He had a left axiliary node drainage a few days ago by urgent care, after noticing that he had a skin infection with purulent drainage located near his left axilla. He was started on doxycyline outpatient for the skin infection, but has had persistent fevers. His last temperature was 102.6F and he alternated between Tylenol and ibuprofen but was not able to get his temperature down. He has a history of multiple skin infections and has tolerated doxycyline in the past. Patient also endorses a generalized skin rash since Saturday, nausea with 2 episodes of NBNB emesis, and loose stool. He also endorses generalized abdominal pain.    Of note, patient also complains of left shoulder pain and rates it 7/10. He states he has history of prior dislocations and movement of the shoulder increases pain.     Patient denies  HA,  CP, SOB, Dysuria, Leg pain, or Leg swelling.    Patient denies any recent travel, sick contacts, or long periods of immobilization.    IN THE ED:  Temp   97.9F ,  ,  /60  ,RR 16 , SpO2 98%  S/P bentyl 20mg x1, pepcid 20mg x1, zofran 4mg x1, ns 1l x1  EKG:  pending  Labs significant for wbc 12.84, 43% band, Na 134, Cr 4.8, lactate 8.6, magnesium 0.8  Imaging: CXR: No acute finding.     (05 Mar 2024 14:42)      PAST MEDICAL & SURGICAL HISTORY:  No pertinent past medical history      No significant past surgical history         HEALTH ISSUES - PROBLEM Dx:  Acute sepsis    NATALIA (acute kidney injury)    Electrolyte imbalance    Left shoulder pain    Need for prophylactic measure    Anemia          Acute renal failure [N17.9]    No pertinent past medical history [[id number]]    No significant past surgical history [[id number]]    Leukocytosis [D72.829]      FAMILY HISTORY:  FH: type 2 diabetes (Father)    FH: hyperlipidemia (Father, Mother)        [SOCIAL HISTORY: ]     smoking:  none currently     EtOH:  none currently     illicit drugs:  none currently     occupation:  Retired     marital status:       Other:       [ALLERGIES/INTOLERANCES:]  Allergies    No Known Allergies    Intolerances        [MEDICATIONS]  MEDICATIONS  (STANDING):  cholecalciferol 1000 Unit(s) Oral daily  doxycycline monohydrate Capsule 100 milliGRAM(s) Oral every 12 hours  FIRST- Mouthwash  BLM 10 milliLiter(s) Swish and Spit four times a day  lactated ringers. 1000 milliLiter(s) (125 mL/Hr) IV Continuous <Continuous>  levoFLOXacin  Tablet 750 milliGRAM(s) Oral every 24 hours  lidocaine   4% Patch 2 Patch Transdermal every 24 hours  multivitamin/minerals 1 Tablet(s) Oral daily  pantoprazole  Injectable 40 milliGRAM(s) IV Push every 12 hours    MEDICATIONS  (PRN):  acetaminophen     Tablet .. 650 milliGRAM(s) Oral every 6 hours PRN Temp greater or equal to 38C (100.4F)  acetaminophen     Tablet .. 650 milliGRAM(s) Oral every 6 hours PRN Mild Pain (1 - 3)  melatonin 3 milliGRAM(s) Oral at bedtime PRN Insomnia  ondansetron Injectable 4 milliGRAM(s) IV Push every 4 hours PRN Nausea and/or Vomiting  traMADol 25 milliGRAM(s) Oral every 6 hours PRN Severe Pain (7 - 10)      [REVIEW OF SYSTEMS: ]  CONSTITUTIONAL: normal, no fever, no shakes, no chills   EYES: No eye pain, no visual disturbances, no discharge  ENMT:  no discharge  NECK: No pain, no stiffness  BREASTS: No pain, no masses, no nipple discharge  RESPIRATORY: No cough, no wheezing, no chills, no hemoptysis; No shortness of breath  CARDIOVASCULAR: No chest pain, no palpitations, no dizziness, no leg swelling  GASTROINTESTINAL: No abdominal, no epigastric pain. No nausea, no vomiting, no hematemesis; No diarrhea , no constipation. No melena, no hematochezia.  GENITOURINARY: No dysuria, no frequency, no hematuria, no incontinence  NEUROLOGICAL: No headaches, no memory loss, no loss of strength, no numbness, no tremors  SKIN: No itching, no burning, no rashes, no lesions   LYMPH NODES: No enlarged glands  ENDOCRINE: No heat or cold intolerance; No hair loss  MUSCULOSKELETAL: No joint pain or swelling; No muscle, no back, no extremity pain  PSYCHIATRIC: No depression, no anxiety, no mood swings, no difficulty sleeping  HEME/LYMPH: No easy bruising, no bleeding gums    [VITALS SIGNS 24hrs]  Vital Signs Last 24 Hrs  T(C): 36.7 (09 Mar 2024 11:41), Max: 37.3 (08 Mar 2024 20:17)  T(F): 98.1 (09 Mar 2024 11:41), Max: 99.2 (08 Mar 2024 20:17)  HR: 83 (09 Mar 2024 11:41) (63 - 94)  BP: 134/85 (09 Mar 2024 11:41) (134/85 - 146/92)  BP(mean): --  RR: 18 (09 Mar 2024 11:41) (18 - 18)  SpO2: 92% (09 Mar 2024 11:41) (92% - 99%)    Parameters below as of 09 Mar 2024 11:41  Patient On (Oxygen Delivery Method): room air      Daily     Daily     I&O's Summary      [PHYSICAL EXAM]  GEN:   HEENT: normocephalic and atraumatic. EOMI. PERRL.    NECK: Supple.  No lymphadenopathy   LUNGS: Clear to auscultation.  HEART: S1S2 Regular rate and rhythm, no MRG  ABDOMEN: Soft, nontender, and nondistended.  Positive bowel sounds.    : No CVA tenderness  EXTREMITIES: Without edema.  NEUROLOGIC: grossly intact.  PSYCHIATRIC: Appropriate affect .  SKIN: No rash     [LABS: ]                        11.7   27.65 )-----------( 66       ( 08 Mar 2024 04:58 )             32.5     CBC Full  -  ( 08 Mar 2024 04:58 )  WBC Count : 27.65 K/uL  RBC Count : 3.88 M/uL  Hemoglobin : 11.7 g/dL  Hematocrit : 32.5 %  Platelet Count - Automated : 66 K/uL  Mean Cell Volume : 83.8 fl  Mean Cell Hemoglobin : 30.2 pg  Mean Cell Hemoglobin Concentration : 36.0 gm/dL  Auto Neutrophil # : 21.29 K/uL  Auto Lymphocyte # : 4.42 K/uL  Auto Monocyte # : 1.94 K/uL  Auto Eosinophil # : 0.00 K/uL  Auto Basophil # : 0.00 K/uL  Auto Neutrophil % : 70.0 %  Auto Lymphocyte % : 16.0 %  Auto Monocyte % : 7.0 %  Auto Eosinophil % : 0.0 %  Auto Basophil % : 0.0 %    03-08    143  |  109<H>  |  51<H>  ----------------------------<  109<H>  4.0   |  27  |  2.10<H>    Ca    8.5      08 Mar 2024 04:58  Phos  3.0     03-08  Mg     2.5     03-08    TPro  5.6<L>  /  Alb  1.8<L>  /  TBili  0.7  /  DBili  x   /  AST  28  /  ALT  47  /  AlkPhos  115  03-08      LIVER FUNCTIONS - ( 08 Mar 2024 04:58 )  Alb: 1.8 g/dL / Pro: 5.6 g/dL / ALK PHOS: 115 U/L / ALT: 47 U/L / AST: 28 U/L / GGT: x               Urinalysis Basic - ( 08 Mar 2024 04:58 )    Color: x / Appearance: x / SG: x / pH: x  Gluc: 109 mg/dL / Ketone: x  / Bili: x / Urobili: x   Blood: x / Protein: x / Nitrite: x   Leuk Esterase: x / RBC: x / WBC x   Sq Epi: x / Non Sq Epi: x / Bacteria: x          CBC TREND (5 Days)  WBC Count: 27.65 K/uL (03-08 @ 04:58)  WBC Count: 29.51 K/uL (03-07 @ 04:37)    Hemoglobin: 11.7 g/dL (03-08 @ 04:58)  Hemoglobin: 11.7 g/dL (03-07 @ 04:37)    Hematocrit: 32.5 % (03-08 @ 04:58)  Hematocrit: 32.6 % (03-07 @ 04:37)    Platelet Count - Automated: 66 K/uL (03-08 @ 04:58)  Platelet Count - Automated: 100 K/uL (03-07 @ 04:37)                                            [MICROBIOLOGY /  VIROLOGY:]          [PATHOLOGY]       [RADIOLOGY & ADDITIONAL STUDIES:]        Patient is a 23y old  Male who presents with a chief complaint of Sepsis and NATALIA (09 Mar 2024 10:46)      HPI:  22 yo male with no significant pmh presents for fever, chills, nausea for >3 days.    Patient states that since Saturday, he has been having significant rigors, fevers, and has been feeling unwell. He had a left axiliary node drainage a few days ago by urgent care, after noticing that he had a skin infection with purulent drainage located near his left axilla. He was started on doxycyline outpatient for the skin infection, but has had persistent fevers. His last temperature was 102.6F and he alternated between Tylenol and ibuprofen but was not able to get his temperature down. He has a history of multiple skin infections and has tolerated doxycyline in the past. Patient also endorses a generalized skin rash since Saturday, nausea with 2 episodes of NBNB emesis, and loose stool. He also endorses generalized abdominal pain.    Of note, patient also complains of left shoulder pain and rates it 7/10. He states he has history of prior dislocations and movement of the shoulder increases pain.     Patient denies  HA,  CP, SOB, Dysuria, Leg pain, or Leg swelling.    Patient denies any recent travel, sick contacts, or long periods of immobilization.    IN THE ED:  Temp   97.9F ,  ,  /60  ,RR 16 , SpO2 98%  S/P bentyl 20mg x1, pepcid 20mg x1, zofran 4mg x1, ns 1l x1  EKG:  pending  Labs significant for wbc 12.84, 43% band, Na 134, Cr 4.8, lactate 8.6, magnesium 0.8  Imaging: CXR: No acute finding.     (05 Mar 2024 14:42)      PAST MEDICAL & SURGICAL HISTORY:  No pertinent past medical history      No significant past surgical history         HEALTH ISSUES - PROBLEM Dx:  Acute sepsis    NATALIA (acute kidney injury)    Electrolyte imbalance    Left shoulder pain    Need for prophylactic measure    Anemia          Acute renal failure [N17.9]    No pertinent past medical history [[id number]]    No significant past surgical history [[id number]]    Leukocytosis [D72.829]      FAMILY HISTORY:  FH: type 2 diabetes (Father)    FH: hyperlipidemia (Father, Mother)        [SOCIAL HISTORY: ]     smoking:  none currently     EtOH:  none currently, social     illicit drugs:  none currently     occupation:       marital status:  single, no children     Other:       [ALLERGIES/INTOLERANCES:]  Allergies    No Known Allergies    Intolerances        [MEDICATIONS]  MEDICATIONS  (STANDING):  cholecalciferol 1000 Unit(s) Oral daily  doxycycline monohydrate Capsule 100 milliGRAM(s) Oral every 12 hours  FIRST- Mouthwash  BLM 10 milliLiter(s) Swish and Spit four times a day  lactated ringers. 1000 milliLiter(s) (125 mL/Hr) IV Continuous <Continuous>  levoFLOXacin  Tablet 750 milliGRAM(s) Oral every 24 hours  lidocaine   4% Patch 2 Patch Transdermal every 24 hours  multivitamin/minerals 1 Tablet(s) Oral daily  pantoprazole  Injectable 40 milliGRAM(s) IV Push every 12 hours    MEDICATIONS  (PRN):  acetaminophen     Tablet .. 650 milliGRAM(s) Oral every 6 hours PRN Temp greater or equal to 38C (100.4F)  acetaminophen     Tablet .. 650 milliGRAM(s) Oral every 6 hours PRN Mild Pain (1 - 3)  melatonin 3 milliGRAM(s) Oral at bedtime PRN Insomnia  ondansetron Injectable 4 milliGRAM(s) IV Push every 4 hours PRN Nausea and/or Vomiting  traMADol 25 milliGRAM(s) Oral every 6 hours PRN Severe Pain (7 - 10)      [REVIEW OF SYSTEMS: ]  CONSTITUTIONAL: normal, +fever, no shakes, no chills   EYES: No eye pain, no visual disturbances, no discharge  ENMT:  no discharge  NECK: No pain, no stiffness  BREASTS: No pain, no masses, no nipple discharge  RESPIRATORY: No cough, no wheezing, no chills, no hemoptysis; No shortness of breath  CARDIOVASCULAR: No chest pain, no palpitations, no dizziness, no leg swelling  GASTROINTESTINAL: No abdominal, no epigastric pain. No nausea, no vomiting, no hematemesis; No diarrhea , no constipation. No melena, no hematochezia.  GENITOURINARY: No dysuria, no frequency, no hematuria, no incontinence  NEUROLOGICAL: No headaches, no memory loss, no loss of strength, no numbness, no tremors  SKIN: No itching, no burning, no rashes, no lesions   LYMPH NODES: No enlarged glands  ENDOCRINE: No heat or cold intolerance; No hair loss  MUSCULOSKELETAL: No joint pain or swelling; No muscle, no back, no extremity pain  PSYCHIATRIC: No depression, no anxiety, no mood swings, no difficulty sleeping  HEME/LYMPH: No easy bruising, no bleeding gums    [VITALS SIGNS 24hrs]  Vital Signs Last 24 Hrs  T(C): 36.7 (09 Mar 2024 11:41), Max: 37.3 (08 Mar 2024 20:17)  T(F): 98.1 (09 Mar 2024 11:41), Max: 99.2 (08 Mar 2024 20:17)  HR: 83 (09 Mar 2024 11:41) (63 - 94)  BP: 134/85 (09 Mar 2024 11:41) (134/85 - 146/92)  BP(mean): --  RR: 18 (09 Mar 2024 11:41) (18 - 18)  SpO2: 92% (09 Mar 2024 11:41) (92% - 99%)    Parameters below as of 09 Mar 2024 11:41  Patient On (Oxygen Delivery Method): room air      Daily     Daily     I&O's Summary      [PHYSICAL EXAM]  GEN:   HEENT: normocephalic and atraumatic. EOMI. PERRL.    NECK: Supple.  No lymphadenopathy   LUNGS: Clear to auscultation.  HEART: S1S2 Regular rate and rhythm, no MRG  ABDOMEN: Soft, nontender, and nondistended.  Positive bowel sounds.    : No CVA tenderness  EXTREMITIES: Without edema.  NEUROLOGIC: grossly intact.  PSYCHIATRIC: Appropriate affect .  SKIN: No rash     [LABS: ]                        11.7   27.65 )-----------( 66       ( 08 Mar 2024 04:58 )             32.5     CBC Full  -  ( 08 Mar 2024 04:58 )  WBC Count : 27.65 K/uL  RBC Count : 3.88 M/uL  Hemoglobin : 11.7 g/dL  Hematocrit : 32.5 %  Platelet Count - Automated : 66 K/uL  Mean Cell Volume : 83.8 fl  Mean Cell Hemoglobin : 30.2 pg  Mean Cell Hemoglobin Concentration : 36.0 gm/dL  Auto Neutrophil # : 21.29 K/uL  Auto Lymphocyte # : 4.42 K/uL  Auto Monocyte # : 1.94 K/uL  Auto Eosinophil # : 0.00 K/uL  Auto Basophil # : 0.00 K/uL  Auto Neutrophil % : 70.0 %  Auto Lymphocyte % : 16.0 %  Auto Monocyte % : 7.0 %  Auto Eosinophil % : 0.0 %  Auto Basophil % : 0.0 %    03-08  143  |  109<H>  |  51<H>  ----------------------------<  109<H>  4.0   |  27  |  2.10<H>  Ca    8.5      08 Mar 2024 04:58  Phos  3.0     03-08  Mg     2.5     03-08  TPro  5.6<L>  /  Alb  1.8<L>  /  TBili  0.7  /  DBili  x   /  AST  28  /  ALT  47  /  AlkPhos  115  03-08    LIVER FUNCTIONS - ( 08 Mar 2024 04:58 )  Alb: 1.8 g/dL / Pro: 5.6 g/dL / ALK PHOS: 115 U/L / ALT: 47 U/L / AST: 28 U/L / GGT: x           Urinalysis Basic - ( 08 Mar 2024 04:58 )  Color: x / Appearance: x / SG: x / pH: x  Gluc: 109 mg/dL / Ketone: x  / Bili: x / Urobili: x   Blood: x / Protein: x / Nitrite: x   Leuk Esterase: x / RBC: x / WBC x   Sq Epi: x / Non Sq Epi: x / Bacteria: x          CBC TREND (5 Days)  WBC Count: 27.65 K/uL (03-08 @ 04:58)  WBC Count: 29.51 K/uL (03-07 @ 04:37)    Hemoglobin: 11.7 g/dL (03-08 @ 04:58)  Hemoglobin: 11.7 g/dL (03-07 @ 04:37)    Hematocrit: 32.5 % (03-08 @ 04:58)  Hematocrit: 32.6 % (03-07 @ 04:37)    Platelet Count - Automated: 66 K/uL (03-08 @ 04:58)  Platelet Count - Automated: 100 K/uL (03-07 @ 04:37)        [MICROBIOLOGY /  VIROLOGY:]      [PATHOLOGY]       [RADIOLOGY & ADDITIONAL STUDIES:]     ACC: [id number] EXAM:  US DPLX LWR EXT VEINS COMPL BI   ORDERED BY:  PORSHA GALVIN   PROCEDURE DATE:  03/07/2024    INTERPRETATION:  CLINICAL INFORMATION: Elevated d-dimer  COMPARISON: None available.  TECHNIQUE: Duplex sonography of the BILATERAL LOWER extremity veins with color and spectral Doppler, with and without compression.  FINDINGS:  RIGHT:  Normal compressibility of the RIGHT common femoral, femoral and popliteal veins.  Doppler examination shows normal spontaneous and phasic flow.  No RIGHT calf vein thrombosis is detected.  LEFT:  Normal compressibility of the LEFT common femoral, femoral and popliteal veins.  Doppler examination shows normal spontaneous and phasic flow.  No LEFT calf vein thrombosis is detected.  IMPRESSION:  No evidence of deep venous thrombosis in either lower extremity.  --- End of Report ---  NADIA GARNICA MD; Attending Radiologist  This document has been electronically signed. Mar  7 2024  2:44PM      ACC: [id number] EXAM:  US AXILLA ONLY LEFT   ORDERED BY: RUTH SANDOVAL   PROCEDURE DATE:  03/06/2024    INTERPRETATION:  Clinical information: Sepsis. Recent drain of left axillary abscess.  COMPARISON: CT scan chest 3/5/2024.  Limitedtargeted ultrasound examination of the left axillary region is performed utilizing gray scale and color Doppler technique.  There is an avascular 1.8 x 0.5 x 1.3 cm heterogeneous fluid collection in the subcutaneous soft tissues.  This is approximately 3 mm in depth from the skin surface.  IMPRESSION:  Small heterogeneous subcutaneous fluid collection in the left axilla region as discussed, which may correspond to abscess.  --- End of Report ---  DEVAN WILSON MD; Attending Radiologist  This document has been electronically signed. Mar  6 2024  8:35AM      ACC: [id number] EXAM:  CT CHEST   ORDERED BY: ANNALISE BECKER   ACC: [id number] EXAM:  CT ABDOMEN AND PELVIS   ORDERED BY: ANNALISE BECKER   PROCEDURE DATE:  03/05/2024    INTERPRETATION:  CLINICAL INFORMATION: Fevers. Myalgias.  COMPARISON: None.  CONTRAST/COMPLICATIONS:  ·	IV Contrast: None  ·	Oral Contrast: None  ·	Complications: None reported at time of study completion  PROCEDURE:  ·	CT of the Chest, Abdomen and Pelvis was performed.  ·	Sagittal and coronal reformats were performed.  FINDINGS:  CHEST:  LUNGS AND LARGE AIRWAYS: Patent central airways. Small bilateral atelectasis. Mild biapical pleural parenchymal scarring. No evidence for pulmonary consolidation or infiltrate.  PLEURA: No pleural effusion or pneumothorax.  VESSELS: Within normal limits.  HEART: Heart size is normal. No pericardial effusion.  MEDIASTINUM AND JULIA: Mildly enlarged left axillary lymph node measuring 1.3 cm in short axis. No evidence for enlarged mediastinal or hilar lymph nodes.  CHEST WALL AND LOWER NECK: Within normal limits.  ·	ABDOMEN AND PELVIS: Injection of the abdomen and pelvis is limited by lack of IV contrast.  ·	LIVER: Hepatic steatosis.  ·	BILE DUCTS: Normal caliber.  ·	GALLBLADDER: Within normal limits.  ·	SPLEEN: Within normal limits.  ·	PANCREAS: Within normal limits.  ·	ADRENALS: The right adrenal appears unremarkable. Nonspecific mild left adrenal thickening.  ·	KIDNEYS/URETERS: Within normal limits. No evidence of a calculus in the kidneys or ureters. No hydronephrosis.  ·	BLADDER: Within normal limits.  ·	REPRODUCTIVE ORGANS: The prostate and seminal vesicles appear grossly unremarkable.  ·	BOWEL: No bowel obstruction. Appendix slightly thickened measuring 8 mm in caliber without adjacent stranding. Appendicolith in the proximal appendix. Apparent focal distal esophageal mural thickening.  ·	PERITONEUM: No free air or ascites.  ·	VESSELS: Within normal limits.  ·	RETROPERITONEUM/LYMPH NODES: No lymphadenopathy.  ·	ABDOMINAL WALL: Small fat-containing umbilical hernia. Small fat-containing bilateral inguinal hernias.  ·	BONES: Within normal limits.  IMPRESSION:  ·	No evidence for pulmonary consolidation or infiltrate.  ·	Mildly enlarged left axillary lymph node  ·	Appendix slightly thickened measuring 8 mm in caliber without adjacent stranding. Appendicolith in the proximal appendix. Findings are equivocal for acute appendicitis. Clinical correlation and continued clinical observation are recommended.  ·	Apparent focal distal esophageal mural thickening. EGD may be pursued for further evaluation.  --- End of Report ---  NADIA GARNICA MD; Attending Radiologist  This document has been electronically signed. Mar  5 2024  4:37PM      ACC: [id number] EXAM:  US KIDNEYS AND BLADDER   ORDERED BY: SUDHIR MAY   PROCEDURE DATE:  03/05/2024    INTERPRETATION:  CLINICAL INFORMATION: Acute kidney injury  COMPARISON: No prior examinations are available for comparison at the time of this interpretation.  TECHNIQUE: Sonography of the kidneys and bladder.  FINDINGS: Images of the right kidney which include the liver demonstrates increased hepatic echogenicity in keeping with hepatic steatosis.  Right kidney: 11.2 cm. No hydronephrosis or calculi.  Left kidney: 12.1 cm. No hydronephrosis or calculi.  Urinary bladder: Minimally distended limiting detailed assessment  IMPRESSION:  Hepatic steatosis  No hydronephrosis  --- End of Report ---  MANUEL AGUIRRE MD; Attending Radiologist  This document has been electronically signed. Mar  5 2024  3:45PM

## 2024-03-09 NOTE — PROGRESS NOTE ADULT - PROBLEM SELECTOR PLAN 6
dvt ppx: SCDs, encourage ambulation   PT evaluation      #Thrombocytopenia  Could be in setting of infection  -stop heparin  -check HIT ab  -Follow CBC    Discussed with mother at bedside, aware and in agreement with above.

## 2024-03-09 NOTE — PROGRESS NOTE ADULT - PROBLEM SELECTOR PLAN 5
pt complaining of left shoulder pain, hx of dislocation  - s/w lidocaine patch
dvt ppx: heparin   management per ICU
dvt ppx: heparin   management per ICU
pt complaining of left shoulder pain, hx of dislocation  - s/w lidocaine patch

## 2024-03-09 NOTE — PROGRESS NOTE ADULT - PROVIDER SPECIALTY LIST ADULT
Critical Care
Gastroenterology
Nephrology
Cardiology
Cardiology
Critical Care
Infectious Disease
Infectious Disease
Surgery
Surgery
Critical Care
Infectious Disease
Nephrology
Surgery
Critical Care
Hospitalist
Nephrology
Gastroenterology
Hospitalist

## 2024-03-09 NOTE — PROGRESS NOTE ADULT - REASON FOR ADMISSION
Sepsis and NATALIA

## 2024-03-09 NOTE — PROGRESS NOTE ADULT - SUBJECTIVE AND OBJECTIVE BOX
Patient is a 23y old  Male who presents with a chief complaint of Sepsis and NATALIA (08 Mar 2024 12:59)      INTERVAL HPI/OVERNIGHT EVENTS: Patient seen and examined at bedside. No overnight events. Tolerating diet. Denies fever, chills, chest pain, shortness of breath, abdominal pain, nausea/vomiting, headache.    MEDICATIONS  (STANDING):  cholecalciferol 1000 Unit(s) Oral daily  doxycycline monohydrate Capsule 100 milliGRAM(s) Oral every 12 hours  FIRST- Mouthwash  BLM 10 milliLiter(s) Swish and Spit four times a day  lactated ringers. 1000 milliLiter(s) (125 mL/Hr) IV Continuous <Continuous>  levoFLOXacin  Tablet 750 milliGRAM(s) Oral every 24 hours  lidocaine   4% Patch 2 Patch Transdermal every 24 hours  multivitamin/minerals 1 Tablet(s) Oral daily  pantoprazole  Injectable 40 milliGRAM(s) IV Push every 12 hours    MEDICATIONS  (PRN):  acetaminophen     Tablet .. 650 milliGRAM(s) Oral every 6 hours PRN Temp greater or equal to 38C (100.4F)  acetaminophen     Tablet .. 650 milliGRAM(s) Oral every 6 hours PRN Mild Pain (1 - 3)  melatonin 3 milliGRAM(s) Oral at bedtime PRN Insomnia  ondansetron Injectable 4 milliGRAM(s) IV Push every 4 hours PRN Nausea and/or Vomiting  traMADol 25 milliGRAM(s) Oral every 6 hours PRN Severe Pain (7 - 10)      Allergies    No Known Allergies    Intolerances        REVIEW OF SYSTEMS:  CONSTITUTIONAL: No fever or chills  HEENT:  No headache, no sore throat  RESPIRATORY: No cough, wheezing, or shortness of breath  CARDIOVASCULAR: No chest pain, palpitations  GASTROINTESTINAL: No abd pain, nausea, vomiting, or diarrhea  GENITOURINARY: No dysuria, frequency, or hematuria  NEUROLOGICAL: no focal weakness or dizziness  MUSCULOSKELETAL: no myalgias     Vital Signs Last 24 Hrs  T(C): 36.7 (09 Mar 2024 04:52), Max: 37.3 (08 Mar 2024 20:17)  T(F): 98 (09 Mar 2024 04:52), Max: 99.2 (08 Mar 2024 20:17)  HR: 63 (09 Mar 2024 04:52) (63 - 94)  BP: 142/68 (09 Mar 2024 04:52) (121/83 - 146/92)  BP(mean): --  RR: 18 (09 Mar 2024 04:52) (18 - 18)  SpO2: 99% (09 Mar 2024 04:52) (91% - 99%)    Parameters below as of 09 Mar 2024 04:52  Patient On (Oxygen Delivery Method): room air        PHYSICAL EXAM:  GENERAL: NAD  HEENT:  anicteric, moist mucous membranes  CHEST/LUNG:  CTA b/l, no rales, wheezes, or rhonchi  HEART:  RRR, S1, S2  ABDOMEN:  BS+, soft, nontender, nondistended  EXTREMITIES: no edema, cyanosis, or calf tenderness  NERVOUS SYSTEM: awake, alert    LABS:    CBC Full  -  ( 08 Mar 2024 04:58 )  WBC Count : 27.65 K/uL  Hemoglobin : 11.7 g/dL  Hematocrit : 32.5 %  Platelet Count - Automated : 66 K/uL  Mean Cell Volume : 83.8 fl  Mean Cell Hemoglobin : 30.2 pg  Mean Cell Hemoglobin Concentration : 36.0 gm/dL  Auto Neutrophil # : 21.29 K/uL  Auto Lymphocyte # : 4.42 K/uL  Auto Monocyte # : 1.94 K/uL  Auto Eosinophil # : 0.00 K/uL  Auto Basophil # : 0.00 K/uL  Auto Neutrophil % : 70.0 %  Auto Lymphocyte % : 16.0 %  Auto Monocyte % : 7.0 %  Auto Eosinophil % : 0.0 %  Auto Basophil % : 0.0 %      Ca    8.5        08 Mar 2024 04:58        Urinalysis Basic - ( 08 Mar 2024 04:58 )    Color: x / Appearance: x / SG: x / pH: x  Gluc: 109 mg/dL / Ketone: x  / Bili: x / Urobili: x   Blood: x / Protein: x / Nitrite: x   Leuk Esterase: x / RBC: x / WBC x   Sq Epi: x / Non Sq Epi: x / Bacteria: x      CAPILLARY BLOOD GLUCOSE            Culture - Urine (collected 03-06-24 @ 05:00)  Source: Clean Catch Clean Catch (Midstream)  Final Report (03-07-24 @ 09:38):    No growth    Culture - Blood (collected 03-05-24 @ 14:45)  Source: .Blood Blood  Preliminary Report (03-08-24 @ 23:01):    No growth at 72 Hours    Culture - Blood (collected 03-05-24 @ 14:33)  Source: .Blood Blood  Preliminary Report (03-08-24 @ 23:01):    No growth at 72 Hours        RADIOLOGY & ADDITIONAL TESTS:    Personally reviewed.     Consultant(s) Notes Reviewed:  [x] YES  [ ] NO

## 2024-03-10 LAB
ANA TITR SER: NEGATIVE — SIGNIFICANT CHANGE UP
AUTO DIFF PNL BLD: NEGATIVE — SIGNIFICANT CHANGE UP
C-ANCA SER-ACNC: NEGATIVE — SIGNIFICANT CHANGE UP
CULTURE RESULTS: SIGNIFICANT CHANGE UP
CULTURE RESULTS: SIGNIFICANT CHANGE UP
MPO AB + PR3 PNL SER: SIGNIFICANT CHANGE UP
P-ANCA SER-ACNC: NEGATIVE — SIGNIFICANT CHANGE UP
SPECIMEN SOURCE: SIGNIFICANT CHANGE UP
SPECIMEN SOURCE: SIGNIFICANT CHANGE UP

## 2024-03-11 LAB
A PHAGOCYTOPH IGG TITR SER IF: SIGNIFICANT CHANGE UP
B BURGDOR AB SER QL IA: 0.15 IV — SIGNIFICANT CHANGE UP
B MICROTI IGG TITR SER: SIGNIFICANT CHANGE UP
E CHAFFEENSIS IGG TITR SER IF: SIGNIFICANT CHANGE UP
GBM IGG SER-ACNC: <0.2 — SIGNIFICANT CHANGE UP (ref 0–0.9)

## 2024-03-11 NOTE — CHART NOTE - NSCHARTNOTEFT_GEN_A_CORE
Patient discharged on 3/9. Spoke to him on the phone ([phone]) today. He reports feeling better overall and appetite improved. Just a little tired but denies any fevers/chills/headache. Advised outpatient follow up ID to repeat labs and re-check WBC, platelets. Plan for outpatient ID follow up on 3/13/24, patient agreeable.    Mary Pitts MD  Division of Infectious Diseases   Cell [phone] between 8am and 6pm   After 6pm and weekends please call ID service at [phone].

## 2024-03-13 ENCOUNTER — OUTPATIENT (OUTPATIENT)
Dept: OUTPATIENT SERVICES | Facility: HOSPITAL | Age: 24
LOS: 1 days | End: 2024-03-13
Payer: MEDICAID

## 2024-03-13 ENCOUNTER — LABORATORY RESULT (OUTPATIENT)
Age: 24
End: 2024-03-13

## 2024-03-13 ENCOUNTER — APPOINTMENT (OUTPATIENT)
Dept: INFECTIOUS DISEASE | Facility: CLINIC | Age: 24
End: 2024-03-13
Payer: MEDICAID

## 2024-03-13 VITALS
BODY MASS INDEX: 28.34 KG/M2 | WEIGHT: 187 LBS | DIASTOLIC BLOOD PRESSURE: 83 MMHG | HEIGHT: 68 IN | HEART RATE: 70 BPM | SYSTOLIC BLOOD PRESSURE: 126 MMHG | OXYGEN SATURATION: 98 % | TEMPERATURE: 97.7 F

## 2024-03-13 DIAGNOSIS — D72.829 ELEVATED WHITE BLOOD CELL COUNT, UNSPECIFIED: ICD-10-CM

## 2024-03-13 DIAGNOSIS — B97.89 OTHER VIRAL AGENTS AS THE CAUSE OF DISEASES CLASSIFIED ELSEWHERE: ICD-10-CM

## 2024-03-13 LAB
ALBUMIN SERPL ELPH-MCNC: 4.1 G/DL
ALP BLD-CCNC: 101 U/L
ALT SERPL-CCNC: 75 U/L
ANION GAP SERPL CALC-SCNC: 13 MMOL/L
APPEARANCE: CLEAR
AST SERPL-CCNC: 27 U/L
BASOPHILS # BLD AUTO: 0.13 K/UL
BASOPHILS NFR BLD AUTO: 0.9 %
BILIRUB SERPL-MCNC: 0.8 MG/DL
BILIRUBIN URINE: NEGATIVE
BLOOD URINE: NEGATIVE
BUN SERPL-MCNC: 22 MG/DL
CALCIUM SERPL-MCNC: 9.7 MG/DL
CHLORIDE SERPL-SCNC: 102 MMOL/L
CO2 SERPL-SCNC: 24 MMOL/L
COLOR: YELLOW
CREAT SERPL-MCNC: 1.19 MG/DL
EGFR: 87 ML/MIN/1.73M2
EOSINOPHIL # BLD AUTO: 0.37 K/UL
EOSINOPHIL NFR BLD AUTO: 2.6 %
GLUCOSE QUALITATIVE U: NEGATIVE MG/DL
GLUCOSE SERPL-MCNC: 97 MG/DL
HCT VFR BLD CALC: 43.3 %
HGB BLD-MCNC: 14 G/DL
KETONES URINE: NEGATIVE MG/DL
LEUKOCYTE ESTERASE URINE: NEGATIVE
LYMPHOCYTES # BLD AUTO: 4.65 K/UL
LYMPHOCYTES NFR BLD AUTO: 32.5 %
MAN DIFF?: NORMAL
MCHC RBC-ENTMCNC: 28.6 PG
MCHC RBC-ENTMCNC: 32.3 GM/DL
MCV RBC AUTO: 88.4 FL
MONOCYTES # BLD AUTO: 1.26 K/UL
MONOCYTES NFR BLD AUTO: 8.8 %
NEUTROPHILS # BLD AUTO: 7.02 K/UL
NEUTROPHILS NFR BLD AUTO: 49.1 %
NITRITE URINE: NEGATIVE
PH URINE: 5.5
PLATELET # BLD AUTO: 440 K/UL
POTASSIUM SERPL-SCNC: 5.5 MMOL/L
PROT SERPL-MCNC: 7.6 G/DL
PROTEIN URINE: NEGATIVE MG/DL
RBC # BLD: 4.9 M/UL
RBC # FLD: 14.6 %
SODIUM SERPL-SCNC: 139 MMOL/L
SPECIFIC GRAVITY URINE: 1.02
UROBILINOGEN URINE: 0.2 MG/DL
WBC # FLD AUTO: 14.3 K/UL

## 2024-03-13 PROCEDURE — 99214 OFFICE O/P EST MOD 30 MIN: CPT

## 2024-03-13 PROCEDURE — G0463: CPT

## 2024-03-14 DIAGNOSIS — L02.419 CUTANEOUS ABSCESS OF LIMB, UNSPECIFIED: ICD-10-CM

## 2024-03-14 DIAGNOSIS — D69.6 THROMBOCYTOPENIA, UNSPECIFIED: ICD-10-CM

## 2024-03-14 DIAGNOSIS — D72.829 ELEVATED WHITE BLOOD CELL COUNT, UNSPECIFIED: ICD-10-CM

## 2024-03-14 LAB
F. TULARENSIS AB, IGG: NEGATIVE
F. TULARENSIS AB, IGM: NEGATIVE
F. TULARENSIS INTERPRETATION: NORMAL

## 2024-03-14 NOTE — ADDENDUM
[FreeTextEntry1] : WBC improved. Potassium mildly elevated at 5.5. Patient reports feeling well. Advised to avoid high potassium foods and repeat test/follow up with PCP or Hematology. Advised to go to the ER if with chest pain or feels unwell.

## 2024-03-14 NOTE — ASSESSMENT
[FreeTextEntry1] : 24 yo male with hx of skin infections but no other significant pmh, who presents for follow up. He was recently admitted to [facility], found to have NATALIA and concern for sepsis of unclear etiology. He was being treated for L axillary abscess and possible UTI. He also reports having a pet rabbit although tularemia serology negative. Tick serologies also negative. Patient improved and discharged on doxycycline and levofloxacin.  Patient reports feeling better. L axillary area improved and otherwise no other obvious signs/symptoms or infection. Had labs earlier this week--still with WBC of 22 although thrombocytopenia resolved. Complaining of some tingling on the fingertips--he says it is improving but cannot rule out that this is related to levofloxacin, so will stop as he has completed 7 days anyway.  #Leukocytosis #Thrombocytopenia #L axillary abscess  -continue doxycycline to complete 14 days -discontinue levofloxacin -will check CBC, BMP, repeat francisella tularemia serologies -discussed with father at bedside -follow up with ID as needed  Mary Pitts MD Division of Infectious Disease [facility]

## 2024-03-14 NOTE — PHYSICAL EXAM
[General Appearance - Alert] : alert [General Appearance - In No Acute Distress] : in no acute distress [] : no respiratory distress [Respiration, Rhythm And Depth] : normal respiratory rhythm and effort [Heart Rate And Rhythm] : heart rate was normal and rhythm regular [Edema] : there was no peripheral edema [Abdomen Soft] : soft [Abdomen Tenderness] : non-tender [No Focal Deficits] : no focal deficits [Oriented To Time, Place, And Person] : oriented to person, place, and time [Affect] : the affect was normal [FreeTextEntry1] : small palpable nodule on L axilla, no erythema, no purulence or active drainage

## 2024-03-14 NOTE — HISTORY OF PRESENT ILLNESS
[FreeTextEntry1] : 22 yo male with hx of skin infections but no other significant pmh, who presents for follow up. He was recently admitted to [facility], found to have NATALIA and concern for sepsis of unclear etiology. He was being treated for L axillary abscess and possible UTI. He also reports having a pet rabbit although tularemia serology negative. Patient improved and discharged on doxycycline and levofloxacin.  Patient reports feeling better. Just a little tired but no new issues. He denies any fevers, chills, headache, SOB, nausea, diarrhea, dysuria or other urinary symptoms. He says he still has some mild tenderness on L axilla but better, and he saw Dermatology earlier this week as this has been a recurrent issue--concern for hidradenitis.

## 2024-03-14 NOTE — REVIEW OF SYSTEMS
[Fever] : no fever [Chills] : no chills [Eye Pain] : no eye pain [Eyesight Problems] : no eyesight problems [Nasal Discharge] : no nasal discharge [Sore Throat] : no sore throat [Chest Pain] : no chest pain [Lower Ext Edema] : no extremity edema [Shortness Of Breath] : no shortness of breath [Cough] : no cough [Vomiting] : no vomiting [Diarrhea] : no diarrhea [Dysuria] : no dysuria [Joint Pain] : no joint pain [Joint Swelling] : no joint swelling [Confused] : no confusion [Dizziness] : no dizziness [de-identified] : prior rash on LE--resolved

## 2024-03-15 ENCOUNTER — TRANSCRIPTION ENCOUNTER (OUTPATIENT)
Age: 24
End: 2024-03-15

## 2024-09-06 NOTE — PHYSICAL THERAPY INITIAL EVALUATION ADULT - LEVEL OF INDEPENDENCE: SIT/STAND, REHAB EVAL
“Patient's name, , procedure and correct site were confirmed during the [place] Timeout.”
independent